# Patient Record
Sex: FEMALE | Race: WHITE | Employment: OTHER | URBAN - METROPOLITAN AREA
[De-identification: names, ages, dates, MRNs, and addresses within clinical notes are randomized per-mention and may not be internally consistent; named-entity substitution may affect disease eponyms.]

---

## 2019-03-07 ENCOUNTER — HOSPITAL ENCOUNTER (EMERGENCY)
Age: 74
Discharge: HOME | End: 2019-03-07
Payer: COMMERCIAL

## 2019-03-07 ENCOUNTER — APPOINTMENT (OUTPATIENT)
Dept: GENERAL RADIOLOGY | Age: 74
End: 2019-03-07
Payer: COMMERCIAL

## 2019-03-07 VITALS
SYSTOLIC BLOOD PRESSURE: 168 MMHG | OXYGEN SATURATION: 92 % | TEMPERATURE: 99.6 F | HEIGHT: 67 IN | BODY MASS INDEX: 14.12 KG/M2 | RESPIRATION RATE: 16 BRPM | DIASTOLIC BLOOD PRESSURE: 97 MMHG | HEART RATE: 92 BPM | WEIGHT: 90 LBS

## 2019-03-07 DIAGNOSIS — S70.02XA CONTUSION OF LEFT HIP, INITIAL ENCOUNTER: ICD-10-CM

## 2019-03-07 DIAGNOSIS — M25.552 LEFT HIP PAIN: ICD-10-CM
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
## 2019-03-07 PROCEDURE — 72110 X-RAY EXAM L-2 SPINE 4/>VWS: CPT

## 2019-03-07 PROCEDURE — 73502 X-RAY EXAM HIP UNI 2-3 VIEWS: CPT

## 2019-03-07 PROCEDURE — 99283 EMERGENCY DEPT VISIT LOW MDM: CPT

## 2019-03-07 ASSESSMENT — PAIN SCALES - GENERAL: PAINLEVEL_OUTOF10: 7

## 2019-03-12 ENCOUNTER — HOSPITAL ENCOUNTER (OUTPATIENT)
Dept: CT IMAGING | Age: 74
Discharge: HOME | End: 2019-03-14
Payer: COMMERCIAL
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
## 2019-03-12 PROCEDURE — 72192 CT PELVIS W/O DYE: CPT

## 2021-06-07 ENCOUNTER — APPOINTMENT (OUTPATIENT)
Dept: ULTRASOUND IMAGING | Age: 76
DRG: 308 | End: 2021-06-07
Payer: MEDICARE

## 2021-06-07 ENCOUNTER — HOSPITAL ENCOUNTER (INPATIENT)
Age: 76
LOS: 6 days | Discharge: HOME | DRG: 308 | End: 2021-06-13
Attending: EMERGENCY MEDICINE | Admitting: INTERNAL MEDICINE
Payer: MEDICARE

## 2021-06-07 ENCOUNTER — APPOINTMENT (OUTPATIENT)
Dept: GENERAL RADIOLOGY | Age: 76
DRG: 308 | End: 2021-06-07
Payer: MEDICARE

## 2021-06-07 DIAGNOSIS — I48.91 ATRIAL FIBRILLATION WITH RVR (HCC): Primary | ICD-10-CM

## 2021-06-07 DIAGNOSIS — J81.0 ACUTE PULMONARY EDEMA (HCC): ICD-10-CM

## 2021-06-07 PROBLEM — K76.1 HEPATIC CONGESTION: Status: ACTIVE | Noted: 2021-06-07

## 2021-06-07 PROBLEM — I50.1 PULMONARY EDEMA CARDIAC CAUSE (HCC): Status: ACTIVE | Noted: 2021-06-07

## 2021-06-07 LAB
ALBUMIN SERPL-MCNC: 3.6 G/DL (ref 3.5–5.2)
ALP BLD-CCNC: 224 U/L (ref 35–104)
ALT SERPL-CCNC: 54 U/L (ref 0–32)
ANION GAP SERPL CALCULATED.3IONS-SCNC: 9 MMOL/L (ref 7–16)
ANISOCYTOSIS: ABNORMAL
AST SERPL-CCNC: 35 U/L (ref 0–31)
BASOPHILS ABSOLUTE: 0.02 E9/L (ref 0–0.2)
BASOPHILS RELATIVE PERCENT: 0.3 % (ref 0–2)
BILIRUB SERPL-MCNC: 0.4 MG/DL (ref 0–1.2)
BUN BLDV-MCNC: 20 MG/DL (ref 6–23)
C-REACTIVE PROTEIN: 2.3 MG/DL (ref 0–0.4)
CALCIUM SERPL-MCNC: 9.4 MG/DL (ref 8.6–10.2)
CHLORIDE BLD-SCNC: 102 MMOL/L (ref 98–107)
CO2: 30 MMOL/L (ref 22–29)
CREAT SERPL-MCNC: 0.7 MG/DL (ref 0.5–1)
D DIMER: 881 NG/ML DDU
EKG ATRIAL RATE: 147 BPM
EKG Q-T INTERVAL: 264 MS
EKG QRS DURATION: 84 MS
EKG QTC CALCULATION (BAZETT): 410 MS
EKG R AXIS: 50 DEGREES
EKG T AXIS: 66 DEGREES
EKG VENTRICULAR RATE: 145 BPM
EOSINOPHILS ABSOLUTE: 0.01 E9/L (ref 0.05–0.5)
EOSINOPHILS RELATIVE PERCENT: 0.2 % (ref 0–6)
GFR AFRICAN AMERICAN: >60
GFR NON-AFRICAN AMERICAN: >60 ML/MIN/1.73
GLUCOSE BLD-MCNC: 114 MG/DL (ref 74–99)
HCT VFR BLD CALC: 37.1 % (ref 34–48)
HEMOGLOBIN: 11.2 G/DL (ref 11.5–15.5)
IMMATURE GRANULOCYTES #: 0.02 E9/L
IMMATURE GRANULOCYTES %: 0.3 % (ref 0–5)
LYMPHOCYTES ABSOLUTE: 0.56 E9/L (ref 1.5–4)
LYMPHOCYTES RELATIVE PERCENT: 8.8 % (ref 20–42)
MCH RBC QN AUTO: 29.6 PG (ref 26–35)
MCHC RBC AUTO-ENTMCNC: 30.2 % (ref 32–34.5)
MCV RBC AUTO: 97.9 FL (ref 80–99.9)
MONOCYTES ABSOLUTE: 0.54 E9/L (ref 0.1–0.95)
MONOCYTES RELATIVE PERCENT: 8.5 % (ref 2–12)
NEUTROPHILS ABSOLUTE: 5.22 E9/L (ref 1.8–7.3)
NEUTROPHILS RELATIVE PERCENT: 81.9 % (ref 43–80)
OVALOCYTES: ABNORMAL
PDW BLD-RTO: 15.1 FL (ref 11.5–15)
PLATELET # BLD: 348 E9/L (ref 130–450)
PMV BLD AUTO: 10.2 FL (ref 7–12)
POIKILOCYTES: ABNORMAL
POLYCHROMASIA: ABNORMAL
POTASSIUM REFLEX MAGNESIUM: 5.2 MMOL/L (ref 3.5–5)
PRO-BNP: 2876 PG/ML (ref 0–450)
PROCALCITONIN: 0.05 NG/ML (ref 0–0.08)
RBC # BLD: 3.79 E12/L (ref 3.5–5.5)
SEDIMENTATION RATE, ERYTHROCYTE: 15 MM/HR (ref 0–20)
SODIUM BLD-SCNC: 141 MMOL/L (ref 132–146)
TOTAL PROTEIN: 7.5 G/DL (ref 6.4–8.3)
TROPONIN, HIGH SENSITIVITY: 15 NG/L (ref 0–9)
WBC # BLD: 6.4 E9/L (ref 4.5–11.5)

## 2021-06-07 PROCEDURE — 83880 ASSAY OF NATRIURETIC PEPTIDE: CPT

## 2021-06-07 PROCEDURE — 87186 SC STD MICRODIL/AGAR DIL: CPT

## 2021-06-07 PROCEDURE — 96365 THER/PROPH/DIAG IV INF INIT: CPT

## 2021-06-07 PROCEDURE — 2500000003 HC RX 250 WO HCPCS: Performed by: EMERGENCY MEDICINE

## 2021-06-07 PROCEDURE — 84484 ASSAY OF TROPONIN QUANT: CPT

## 2021-06-07 PROCEDURE — 85378 FIBRIN DEGRADE SEMIQUANT: CPT

## 2021-06-07 PROCEDURE — 93010 ELECTROCARDIOGRAM REPORT: CPT | Performed by: INTERNAL MEDICINE

## 2021-06-07 PROCEDURE — 2500000003 HC RX 250 WO HCPCS: Performed by: INTERNAL MEDICINE

## 2021-06-07 PROCEDURE — 84145 PROCALCITONIN (PCT): CPT

## 2021-06-07 PROCEDURE — 87040 BLOOD CULTURE FOR BACTERIA: CPT

## 2021-06-07 PROCEDURE — 85651 RBC SED RATE NONAUTOMATED: CPT

## 2021-06-07 PROCEDURE — 85025 COMPLETE CBC W/AUTO DIFF WBC: CPT

## 2021-06-07 PROCEDURE — 71045 X-RAY EXAM CHEST 1 VIEW: CPT

## 2021-06-07 PROCEDURE — 36415 COLL VENOUS BLD VENIPUNCTURE: CPT

## 2021-06-07 PROCEDURE — 93005 ELECTROCARDIOGRAM TRACING: CPT | Performed by: EMERGENCY MEDICINE

## 2021-06-07 PROCEDURE — 80053 COMPREHEN METABOLIC PANEL: CPT

## 2021-06-07 PROCEDURE — 99284 EMERGENCY DEPT VISIT MOD MDM: CPT

## 2021-06-07 PROCEDURE — 87088 URINE BACTERIA CULTURE: CPT

## 2021-06-07 PROCEDURE — 2060000000 HC ICU INTERMEDIATE R&B

## 2021-06-07 PROCEDURE — 2580000003 HC RX 258: Performed by: EMERGENCY MEDICINE

## 2021-06-07 PROCEDURE — 0202U NFCT DS 22 TRGT SARS-COV-2: CPT

## 2021-06-07 PROCEDURE — 93970 EXTREMITY STUDY: CPT

## 2021-06-07 PROCEDURE — 86140 C-REACTIVE PROTEIN: CPT

## 2021-06-07 RX ORDER — POTASSIUM CHLORIDE 7.45 MG/ML
10 INJECTION INTRAVENOUS PRN
Status: DISCONTINUED | OUTPATIENT
Start: 2021-06-07 | End: 2021-06-13 | Stop reason: HOSPADM

## 2021-06-07 RX ORDER — PANTOPRAZOLE SODIUM 40 MG/1
40 TABLET, DELAYED RELEASE ORAL
Status: DISCONTINUED | OUTPATIENT
Start: 2021-06-08 | End: 2021-06-13 | Stop reason: HOSPADM

## 2021-06-07 RX ORDER — 0.9 % SODIUM CHLORIDE 0.9 %
500 INTRAVENOUS SOLUTION INTRAVENOUS ONCE
Status: COMPLETED | OUTPATIENT
Start: 2021-06-07 | End: 2021-06-07

## 2021-06-07 RX ORDER — BUMETANIDE 0.25 MG/ML
1 INJECTION, SOLUTION INTRAMUSCULAR; INTRAVENOUS EVERY 12 HOURS
Status: DISCONTINUED | OUTPATIENT
Start: 2021-06-07 | End: 2021-06-09

## 2021-06-07 RX ORDER — 0.9 % SODIUM CHLORIDE 0.9 %
500 INTRAVENOUS SOLUTION INTRAVENOUS ONCE
Status: DISCONTINUED | OUTPATIENT
Start: 2021-06-07 | End: 2021-06-07

## 2021-06-07 RX ORDER — NITROGLYCERIN 0.4 MG/1
0.4 TABLET SUBLINGUAL EVERY 5 MIN PRN
Status: DISCONTINUED | OUTPATIENT
Start: 2021-06-07 | End: 2021-06-13 | Stop reason: HOSPADM

## 2021-06-07 RX ORDER — POTASSIUM CHLORIDE 20 MEQ/1
40 TABLET, EXTENDED RELEASE ORAL PRN
Status: DISCONTINUED | OUTPATIENT
Start: 2021-06-07 | End: 2021-06-13 | Stop reason: HOSPADM

## 2021-06-07 RX ORDER — ASPIRIN 325 MG
325 TABLET ORAL PRN
COMMUNITY

## 2021-06-07 RX ORDER — DILTIAZEM HYDROCHLORIDE 5 MG/ML
20 INJECTION INTRAVENOUS ONCE
Status: COMPLETED | OUTPATIENT
Start: 2021-06-07 | End: 2021-06-07

## 2021-06-07 RX ORDER — ACETAMINOPHEN 325 MG/1
650 TABLET ORAL EVERY 4 HOURS PRN
Status: DISCONTINUED | OUTPATIENT
Start: 2021-06-07 | End: 2021-06-13 | Stop reason: HOSPADM

## 2021-06-07 RX ADMIN — DILTIAZEM HYDROCHLORIDE 20 MG: 5 INJECTION INTRAVENOUS at 10:15

## 2021-06-07 RX ADMIN — SODIUM CHLORIDE 500 ML: 9 INJECTION, SOLUTION INTRAVENOUS at 09:52

## 2021-06-07 RX ADMIN — DEXTROSE MONOHYDRATE 5 MG/HR: 50 INJECTION, SOLUTION INTRAVENOUS at 10:26

## 2021-06-07 RX ADMIN — BUMETANIDE 1 MG: 0.25 INJECTION INTRAMUSCULAR; INTRAVENOUS at 18:21

## 2021-06-07 NOTE — ED NOTES
RN faxed SBAR to floor. Confirmation received and spoke with Melissa.  Pt ready for transport to room when room is clean       Kim Matson RN  06/07/21 3587

## 2021-06-07 NOTE — ED PROVIDER NOTES
Department of Emergency Medicine   ED  Provider Note  Admit Date/RoomTime: 6/7/2021  9:24 AM  ED Room: 6142/5694-Y          History of Present Illness:  6/7/21, Time: 10:02 AM EDT  Chief Complaint   Patient presents with    Shortness of Breath     BLE edema, x3 weeks, fatigue, dyspnea on exertion    Leg Swelling                Dori Felix is a 68 y.o. female presenting to the ED for dyspnea, beginning 3 weeks ago. The complaint has been constant, severe in severity, and worsened by nothing. Patient noticed increased swelling to bilateral lower extremities. She has increased dyspnea on exertion. She has noticed increased dyspnea while lying flat. She denies any fever chills cough or congestion. Denies any chest pain. She admits to episodes of lightheadedness at home over the last 3 weeks. Review of Systems:   Pertinent positives and negatives are stated within HPI, all other systems reviewed and are negative.        --------------------------------------------- PAST HISTORY ---------------------------------------------  Past Medical History:  has a past medical history of Atrial fibrillation with RVR ([address] Bilateral hearing loss, COPD (chronic obstructive pulmonary disease) ([facility] [address] Hepatic congestion, Moderate to severe mitral regurgitation, Pulmonary edema cardiac cause ([address] and Systolic and diastolic CHF, acute on chronic ([address] Past Surgical History:  has a past surgical history that includes fracture surgery (11/24/215). Social History:  reports that she has never smoked. She has never used smokeless tobacco. She reports that she does not drink alcohol and does not use drugs. Family History: family history includes Heart Attack in her mother; Other in her father. . Unless otherwise noted, family history is non contributory    The patients home medications have been reviewed.     Allergies: Patient has no known allergies. ---------------------------------------------------PHYSICAL EXAM--------------------------------------    Constitutional/General: Alert and oriented x3  Head: Normocephalic and atraumatic  Eyes: PERRL, EOMI, sclera non icteric  Mouth: Oropharynx clear, handling secretions, no trismus, no asymmetry of the posterior oropharynx or uvular edema  Neck: Supple, full ROM, no stridor, no meningeal signs  Respiratory: Lungs clear to auscultation bilaterally,Not in respiratory distress  Cardiovascular:  Irregularly irregular. 2+ distal pulses. Equal extremity pulses. Chest: No chest wall tenderness  GI:  Abdomen Soft, Non tender, Non distended. No rebound, guarding, or rigidity. Musculoskeletal: Moves all extremities x 4. Warm and well perfused, no clubbing, cyanosis. 3+ edema. Capillary refill <3 seconds  Integument: skin warm and dry. No rashes. Neurologic: GCS 15, no focal deficits, symmetric strength 5/5 in the upper and lower extremities bilaterally  Psychiatric: Normal Affect      EKG: Interpreted by emergency department physician, Dr. Sonia Laureano    This EKG is signed and interpreted by me. Rate: 145  Rhythm: Atrial fibrillation  Interpretation: atrial fibrillation (new onset)  Comparison: changes compared to previous EKG      -------------------------------------------------- RESULTS -------------------------------------------------  I have personally reviewed all laboratory and imaging results for this patient. Results are listed below.      LABS: (Lab results interpreted by me)  Results for orders placed or performed during the hospital encounter of 06/07/21   Respiratory Panel, Molecular, with COVID-19 (Restricted: peds pts or suitable admitted adults)    Specimen: Nasopharyngeal; Nasal   Result Value Ref Range    Adenovirus by PCR Not Detected Not Detected    Bordetella parapertussis by PCR Not Detected Not Detected    Bordetella pertussis by PCR Not Detected Not Detected    Chlamydophilia Chloride 107 98 - 107 mmol/L    CO2 31 (H) 22 - 29 mmol/L    Anion Gap 5 (L) 7 - 16 mmol/L    Glucose 107 (H) 74 - 99 mg/dL    BUN 22 6 - 23 mg/dL    CREATININE 0.8 0.5 - 1.0 mg/dL    GFR Non-African American >60 >=60 mL/min/1.73    GFR African American >60     Calcium 8.4 (L) 8.6 - 10.2 mg/dL   Brain Natriuretic Peptide   Result Value Ref Range    Pro-BNP 1,410 (H) 0 - 450 pg/mL   Calcium, Ionized   Result Value Ref Range    Calcium, Ion 1.22 1.15 - 1.33 mmol/L   CBC Auto Differential   Result Value Ref Range    WBC 6.6 4.5 - 11.5 E9/L    RBC 3.58 3.50 - 5.50 E12/L    Hemoglobin 10.3 (L) 11.5 - 15.5 g/dL    Hematocrit 36.0 34.0 - 48.0 %    .6 (H) 80.0 - 99.9 fL    MCH 28.8 26.0 - 35.0 pg    MCHC 28.6 (L) 32.0 - 34.5 %    RDW 15.0 11.5 - 15.0 fL    Platelets 685 512 - 501 E9/L    MPV 10.0 7.0 - 12.0 fL    Neutrophils % 82.4 (H) 43.0 - 80.0 %    Immature Granulocytes % 0.6 0.0 - 5.0 %    Lymphocytes % 6.9 (L) 20.0 - 42.0 %    Monocytes % 8.4 2.0 - 12.0 %    Eosinophils % 1.5 0.0 - 6.0 %    Basophils % 0.2 0.0 - 2.0 %    Neutrophils Absolute 5.40 1.80 - 7.30 E9/L    Immature Granulocytes # 0.04 E9/L    Lymphocytes Absolute 0.45 (L) 1.50 - 4.00 E9/L    Monocytes Absolute 0.55 0.10 - 0.95 E9/L    Eosinophils Absolute 0.10 0.05 - 0.50 E9/L    Basophils Absolute 0.01 0.00 - 0.20 E9/L    Anisocytosis 1+     Polychromasia 1+     Poikilocytes . 1+     Ovalocytes 1+    Lipid Panel   Result Value Ref Range    Cholesterol, Total 120 0 - 199 mg/dL    Triglycerides 74 0 - 149 mg/dL    HDL 44 >40 mg/dL    LDL Calculated 61 0 - 99 mg/dL    VLDL Cholesterol Calculated 15 mg/dL   Magnesium   Result Value Ref Range    Magnesium 2.0 1.6 - 2.6 mg/dL   Lactate, Sepsis   Result Value Ref Range    Lactic Acid, Sepsis 0.9 0.5 - 1.9 mmol/L   Hepatic Function Panel   Result Value Ref Range    Total Protein 6.9 6.4 - 8.3 g/dL    Albumin 3.3 (L) 3.5 - 5.2 g/dL    Alkaline Phosphatase 198 (H) 35 - 104 U/L    ALT 45 (H) 0 - 32 U/L    AST 30 0 - 31 U/L    Total Bilirubin <0.2 0.0 - 1.2 mg/dL    Bilirubin, Direct <0.2 0.0 - 0.3 mg/dL    Bilirubin, Indirect see below 0.0 - 1.0 mg/dL   Hemoglobin A1C   Result Value Ref Range    Hemoglobin A1C 5.6 4.0 - 5.6 %   Ferritin   Result Value Ref Range    Ferritin 82 ng/mL   Iron and TIBC   Result Value Ref Range    Iron 38 37 - 145 mcg/dL    TIBC 251 250 - 450 mcg/dL    Iron Saturation 15 15 - 50 %   Vitamin B12 & Folate   Result Value Ref Range    Vitamin B-12 754 211 - 946 pg/mL    Folate 20.0 4.8 - 24.2 ng/mL   Uric Acid   Result Value Ref Range    Uric Acid, Serum 4.5 2.4 - 5.7 mg/dL   TSH without Reflex   Result Value Ref Range    TSH 2.400 0.270 - 4.200 uIU/mL   Phosphorus   Result Value Ref Range    Phosphorus 3.3 2.5 - 4.5 mg/dL   Sedimentation Rate   Result Value Ref Range    Sed Rate 19 0 - 20 mm/Hr   C-Reactive Protein   Result Value Ref Range    CRP 1.9 (H) 0.0 - 0.4 mg/dL   Basic Metabolic Panel   Result Value Ref Range    Sodium 145 132 - 146 mmol/L    Potassium 4.0 3.5 - 5.0 mmol/L    Chloride 103 98 - 107 mmol/L    CO2 37 (H) 22 - 29 mmol/L    Anion Gap 5 (L) 7 - 16 mmol/L    Glucose 100 (H) 74 - 99 mg/dL    BUN 25 (H) 6 - 23 mg/dL    CREATININE 0.8 0.5 - 1.0 mg/dL    GFR Non-African American >60 >=60 mL/min/1.73    GFR African American >60     Calcium 8.6 8.6 - 10.2 mg/dL   CBC Auto Differential   Result Value Ref Range    WBC 7.3 4.5 - 11.5 E9/L    RBC 3.43 (L) 3.50 - 5.50 E12/L    Hemoglobin 10.1 (L) 11.5 - 15.5 g/dL    Hematocrit 34.5 34.0 - 48.0 %    .6 (H) 80.0 - 99.9 fL    MCH 29.4 26.0 - 35.0 pg    MCHC 29.3 (L) 32.0 - 34.5 %    RDW 15.1 (H) 11.5 - 15.0 fL    Platelets 616 199 - 675 E9/L    MPV 10.0 7.0 - 12.0 fL    Neutrophils % 79.0 43.0 - 80.0 %    Immature Granulocytes % 0.4 0.0 - 5.0 %    Lymphocytes % 6.2 (L) 20.0 - 42.0 %    Monocytes % 11.4 2.0 - 12.0 %    Eosinophils % 2.7 0.0 - 6.0 %    Basophils % 0.3 0.0 - 2.0 %    Neutrophils Absolute 5.75 1.80 - 7.30 E9/L    Immature Non-African American >60 >=60 mL/min/1.73    GFR African American >60     Calcium 8.4 (L) 8.6 - 10.2 mg/dL   CBC Auto Differential   Result Value Ref Range    WBC 8.2 4.5 - 11.5 E9/L    RBC 3.64 3.50 - 5.50 E12/L    Hemoglobin 10.6 (L) 11.5 - 15.5 g/dL    Hematocrit 36.5 34.0 - 48.0 %    .3 (H) 80.0 - 99.9 fL    MCH 29.1 26.0 - 35.0 pg    MCHC 29.0 (L) 32.0 - 34.5 %    RDW 14.9 11.5 - 15.0 fL    Platelets 201 743 - 539 E9/L    MPV 9.8 7.0 - 12.0 fL    Neutrophils % 92.1 (H) 43.0 - 80.0 %    Lymphocytes % 4.4 (L) 20.0 - 42.0 %    Monocytes % 1.8 (L) 2.0 - 12.0 %    Eosinophils % 1.7 0.0 - 6.0 %    Basophils % 0.0 0.0 - 2.0 %    Neutrophils Absolute 7.54 (H) 1.80 - 7.30 E9/L    Lymphocytes Absolute 0.33 (L) 1.50 - 4.00 E9/L    Monocytes Absolute 0.16 0.10 - 0.95 E9/L    Eosinophils Absolute 0.14 0.05 - 0.50 E9/L    Basophils Absolute 0.00 0.00 - 0.20 E9/L    nRBC 0.0 /100 WBC    Polychromasia 1+     Hypochromia 1+     Poikilocytes 1+     Ovalocytes 1+    Magnesium   Result Value Ref Range    Magnesium 1.6 1.6 - 2.6 mg/dL   Hepatic Function Panel   Result Value Ref Range    Total Protein 7.5 6.4 - 8.3 g/dL    Albumin 3.6 3.5 - 5.2 g/dL    Alkaline Phosphatase 176 (H) 35 - 104 U/L    ALT 33 (H) 0 - 32 U/L    AST 21 0 - 31 U/L    Total Bilirubin 0.3 0.0 - 1.2 mg/dL    Bilirubin, Direct <0.2 0.0 - 0.3 mg/dL    Bilirubin, Indirect see below 0.0 - 1.0 mg/dL   Phosphorus   Result Value Ref Range    Phosphorus 2.7 2.5 - 4.5 mg/dL   Sedimentation Rate   Result Value Ref Range    Sed Rate 31 (H) 0 - 20 mm/Hr   C-Reactive Protein   Result Value Ref Range    CRP 2.0 (H) 0.0 - 0.4 mg/dL   EKG 12 Lead   Result Value Ref Range    Ventricular Rate 145 BPM    Atrial Rate 147 BPM    QRS Duration 84 ms    Q-T Interval 264 ms    QTc Calculation (Bazett) 410 ms    R Axis 50 degrees    T Axis 66 degrees   ,       RADIOLOGY:  Interpreted by Radiologist unless otherwise specified  US DUP LOWER EXTREMITIES BILATERAL VENOUS   Final Result   No evidence of DVT in either lower extremity. XR CHEST PORTABLE   Final Result   Hazy opacities are most suspicious for pneumonia. Pulmonary edema would be   an alternative consideration. CT CHEST WO CONTRAST    (Results Pending)   NM Cardiac Stress Test Nuclear Imaging    (Results Pending)                    ------------------------- NURSING NOTES AND VITALS REVIEWED ---------------------------   The nursing notes within the ED encounter and vital signs as below have been reviewed by myself  /65   Pulse 117   Temp 97.3 °F (36.3 °C) (Infrared)   Resp 16   Ht 5' 6\" (1.676 m)   Wt 84 lb 3.2 oz (38.2 kg)   SpO2 100%   BMI 13.59 kg/m²     Oxygen Saturation Interpretation: Normal    The cardiac monitor revealed Afib with a heart rate in the 140s as interpreted by me. The cardiac monitor was ordered secondary to the patient's heart rate and to monitor the patient for dysrhythmia. CPT 31148    The patients available past medical records and past encounters were reviewed.         ------------------------------ ED COURSE/MEDICAL DECISION MAKING----------------------  Medications   nitroGLYCERIN (NITROSTAT) SL tablet 0.4 mg (has no administration in time range)   pantoprazole (PROTONIX) tablet 40 mg (40 mg Oral Given 6/10/21 0647)   acetaminophen (TYLENOL) tablet 650 mg (has no administration in time range)   potassium chloride (KLOR-CON M) extended release tablet 40 mEq (has no administration in time range)     Or   potassium bicarb-citric acid (EFFER-K) effervescent tablet 40 mEq (has no administration in time range)     Or   potassium chloride 10 mEq/100 mL IVPB (Peripheral Line) (has no administration in time range)   apixaban (ELIQUIS) tablet 5 mg (5 mg Oral Given 6/10/21 1001)   cephALEXin (KEFLEX) capsule 250 mg (250 mg Oral Given 6/10/21 1353)   sacubitril-valsartan (ENTRESTO) 24-26 MG per tablet 0.5 tablet (0.5 tablets Oral Given 6/10/21 8313)   metoprolol succinate (TOPROL XL) extended release tablet 12.5 mg (12.5 mg Oral Given 6/10/21 1353)   amiodarone (CORDARONE) tablet 200 mg (has no administration in time range)   torsemide (DEMADEX) tablet 20 mg (has no administration in time range)   regadenoson (LEXISCAN) injection 0.4 mg (has no administration in time range)   0.9 % sodium chloride bolus (0 mLs Intravenous Stopped 6/7/21 1028)   dilTIAZem injection 20 mg (20 mg Intravenous Given 6/7/21 1015)   amiodarone (CORDARONE) 150 mg in dextrose 5 % 100 mL bolus (0 mg Intravenous Stopped 6/8/21 1743)                    Medical Decision Making:     I, Dr. Gerald Camacho am the primary provider of record    Patient presents in atrial fibrillation with rapid ventricular response. Given medications with improvement in the emergency department. Patient remained hemodynamically stable. She does have a degree of pulmonary edema likely contributing to her dyspnea. I believe the patient would benefit from admission for further evaluation and management. Discussed patient care with admitting physician. Re-Evaluations:       Improved on reevaluation. This patient's ED course included: a personal history and physicial examination, re-evaluation prior to disposition, multiple bedside re-evaluations, IV medications, cardiac monitoring, continuous pulse oximetry and complex medical decision making and emergency management    This patient has remained hemodynamically stable during their ED course. Counseling: The emergency provider has spoken with the patient and discussed todays results, in addition to providing specific details for the plan of care and counseling regarding the diagnosis and prognosis. Questions are answered at this time and they are agreeable with the plan.       --------------------------------- IMPRESSION AND DISPOSITION ---------------------------------    IMPRESSION  1. Atrial fibrillation with RVR ([address]    2.  Acute pulmonary edema (HCC) DISPOSITION  Disposition: Admit to telemetry  Patient condition is stable        NOTE: This report was transcribed using voice recognition software.  Every effort was made to ensure accuracy; however, inadvertent computerized transcription errors may be present        Dennys Mittal MD  06/10/21 1459

## 2021-06-08 LAB
ADENOVIRUS BY PCR: NOT DETECTED
ALBUMIN SERPL-MCNC: 3.3 G/DL (ref 3.5–5.2)
ALP BLD-CCNC: 198 U/L (ref 35–104)
ALT SERPL-CCNC: 45 U/L (ref 0–32)
ANION GAP SERPL CALCULATED.3IONS-SCNC: 5 MMOL/L (ref 7–16)
ANISOCYTOSIS: ABNORMAL
AST SERPL-CCNC: 30 U/L (ref 0–31)
BASOPHILS ABSOLUTE: 0.01 E9/L (ref 0–0.2)
BASOPHILS RELATIVE PERCENT: 0.2 % (ref 0–2)
BILIRUB SERPL-MCNC: <0.2 MG/DL (ref 0–1.2)
BILIRUBIN DIRECT: <0.2 MG/DL (ref 0–0.3)
BILIRUBIN, INDIRECT: ABNORMAL MG/DL (ref 0–1)
BORDETELLA PARAPERTUSSIS BY PCR: NOT DETECTED
BORDETELLA PERTUSSIS BY PCR: NOT DETECTED
BUN BLDV-MCNC: 22 MG/DL (ref 6–23)
C-REACTIVE PROTEIN: 1.9 MG/DL (ref 0–0.4)
CALCIUM IONIZED: 1.22 MMOL/L (ref 1.15–1.33)
CALCIUM SERPL-MCNC: 8.4 MG/DL (ref 8.6–10.2)
CHLAMYDOPHILIA PNEUMONIAE BY PCR: NOT DETECTED
CHLORIDE BLD-SCNC: 107 MMOL/L (ref 98–107)
CHOLESTEROL, TOTAL: 120 MG/DL (ref 0–199)
CO2: 31 MMOL/L (ref 22–29)
CORONAVIRUS 229E BY PCR: NOT DETECTED
CORONAVIRUS HKU1 BY PCR: NOT DETECTED
CORONAVIRUS NL63 BY PCR: NOT DETECTED
CORONAVIRUS OC43 BY PCR: NOT DETECTED
CREAT SERPL-MCNC: 0.8 MG/DL (ref 0.5–1)
EOSINOPHILS ABSOLUTE: 0.1 E9/L (ref 0.05–0.5)
EOSINOPHILS RELATIVE PERCENT: 1.5 % (ref 0–6)
FERRITIN: 82 NG/ML
FOLATE: 20 NG/ML (ref 4.8–24.2)
GFR AFRICAN AMERICAN: >60
GFR NON-AFRICAN AMERICAN: >60 ML/MIN/1.73
GLUCOSE BLD-MCNC: 107 MG/DL (ref 74–99)
HBA1C MFR BLD: 5.6 % (ref 4–5.6)
HCT VFR BLD CALC: 36 % (ref 34–48)
HDLC SERPL-MCNC: 44 MG/DL
HEMOGLOBIN: 10.3 G/DL (ref 11.5–15.5)
HUMAN METAPNEUMOVIRUS BY PCR: NOT DETECTED
HUMAN RHINOVIRUS/ENTEROVIRUS BY PCR: NOT DETECTED
IMMATURE GRANULOCYTES #: 0.04 E9/L
IMMATURE GRANULOCYTES %: 0.6 % (ref 0–5)
INFLUENZA A BY PCR: NOT DETECTED
INFLUENZA B BY PCR: NOT DETECTED
IRON SATURATION: 15 % (ref 15–50)
IRON: 38 MCG/DL (ref 37–145)
LACTIC ACID, SEPSIS: 0.9 MMOL/L (ref 0.5–1.9)
LDL CHOLESTEROL CALCULATED: 61 MG/DL (ref 0–99)
LV EF: 28 %
LVEF MODALITY: NORMAL
LYMPHOCYTES ABSOLUTE: 0.45 E9/L (ref 1.5–4)
LYMPHOCYTES RELATIVE PERCENT: 6.9 % (ref 20–42)
MAGNESIUM: 2 MG/DL (ref 1.6–2.6)
MCH RBC QN AUTO: 28.8 PG (ref 26–35)
MCHC RBC AUTO-ENTMCNC: 28.6 % (ref 32–34.5)
MCV RBC AUTO: 100.6 FL (ref 80–99.9)
MONOCYTES ABSOLUTE: 0.55 E9/L (ref 0.1–0.95)
MONOCYTES RELATIVE PERCENT: 8.4 % (ref 2–12)
MYCOPLASMA PNEUMONIAE BY PCR: NOT DETECTED
NEUTROPHILS ABSOLUTE: 5.4 E9/L (ref 1.8–7.3)
NEUTROPHILS RELATIVE PERCENT: 82.4 % (ref 43–80)
OVALOCYTES: ABNORMAL
PARAINFLUENZA VIRUS 1 BY PCR: NOT DETECTED
PARAINFLUENZA VIRUS 2 BY PCR: NOT DETECTED
PARAINFLUENZA VIRUS 3 BY PCR: NOT DETECTED
PARAINFLUENZA VIRUS 4 BY PCR: NOT DETECTED
PDW BLD-RTO: 15 FL (ref 11.5–15)
PHOSPHORUS: 3.3 MG/DL (ref 2.5–4.5)
PLATELET # BLD: 298 E9/L (ref 130–450)
PMV BLD AUTO: 10 FL (ref 7–12)
POIKILOCYTES: ABNORMAL
POLYCHROMASIA: ABNORMAL
POTASSIUM SERPL-SCNC: 5.4 MMOL/L (ref 3.5–5)
PRO-BNP: 1410 PG/ML (ref 0–450)
RBC # BLD: 3.58 E12/L (ref 3.5–5.5)
RESPIRATORY SYNCYTIAL VIRUS BY PCR: NOT DETECTED
SARS-COV-2, PCR: NOT DETECTED
SEDIMENTATION RATE, ERYTHROCYTE: 19 MM/HR (ref 0–20)
SODIUM BLD-SCNC: 143 MMOL/L (ref 132–146)
TOTAL IRON BINDING CAPACITY: 251 MCG/DL (ref 250–450)
TOTAL PROTEIN: 6.9 G/DL (ref 6.4–8.3)
TRIGL SERPL-MCNC: 74 MG/DL (ref 0–149)
TSH SERPL DL<=0.05 MIU/L-ACNC: 2.4 UIU/ML (ref 0.27–4.2)
URIC ACID, SERUM: 4.5 MG/DL (ref 2.4–5.7)
VITAMIN B-12: 754 PG/ML (ref 211–946)
VLDLC SERPL CALC-MCNC: 15 MG/DL
WBC # BLD: 6.6 E9/L (ref 4.5–11.5)

## 2021-06-08 PROCEDURE — 85025 COMPLETE CBC W/AUTO DIFF WBC: CPT

## 2021-06-08 PROCEDURE — 6360000002 HC RX W HCPCS: Performed by: INTERNAL MEDICINE

## 2021-06-08 PROCEDURE — 83540 ASSAY OF IRON: CPT

## 2021-06-08 PROCEDURE — 2060000000 HC ICU INTERMEDIATE R&B

## 2021-06-08 PROCEDURE — 83880 ASSAY OF NATRIURETIC PEPTIDE: CPT

## 2021-06-08 PROCEDURE — 82330 ASSAY OF CALCIUM: CPT

## 2021-06-08 PROCEDURE — 80061 LIPID PANEL: CPT

## 2021-06-08 PROCEDURE — 86140 C-REACTIVE PROTEIN: CPT

## 2021-06-08 PROCEDURE — 36415 COLL VENOUS BLD VENIPUNCTURE: CPT

## 2021-06-08 PROCEDURE — 80048 BASIC METABOLIC PNL TOTAL CA: CPT

## 2021-06-08 PROCEDURE — 84443 ASSAY THYROID STIM HORMONE: CPT

## 2021-06-08 PROCEDURE — 85651 RBC SED RATE NONAUTOMATED: CPT

## 2021-06-08 PROCEDURE — 83605 ASSAY OF LACTIC ACID: CPT

## 2021-06-08 PROCEDURE — 83735 ASSAY OF MAGNESIUM: CPT

## 2021-06-08 PROCEDURE — 6370000000 HC RX 637 (ALT 250 FOR IP): Performed by: INTERNAL MEDICINE

## 2021-06-08 PROCEDURE — 82746 ASSAY OF FOLIC ACID SERUM: CPT

## 2021-06-08 PROCEDURE — 82728 ASSAY OF FERRITIN: CPT

## 2021-06-08 PROCEDURE — 83036 HEMOGLOBIN GLYCOSYLATED A1C: CPT

## 2021-06-08 PROCEDURE — 2500000003 HC RX 250 WO HCPCS: Performed by: INTERNAL MEDICINE

## 2021-06-08 PROCEDURE — 2580000003 HC RX 258: Performed by: INTERNAL MEDICINE

## 2021-06-08 PROCEDURE — 80076 HEPATIC FUNCTION PANEL: CPT

## 2021-06-08 PROCEDURE — 84550 ASSAY OF BLOOD/URIC ACID: CPT

## 2021-06-08 PROCEDURE — 83550 IRON BINDING TEST: CPT

## 2021-06-08 PROCEDURE — 84100 ASSAY OF PHOSPHORUS: CPT

## 2021-06-08 PROCEDURE — 93306 TTE W/DOPPLER COMPLETE: CPT

## 2021-06-08 PROCEDURE — 82607 VITAMIN B-12: CPT

## 2021-06-08 RX ORDER — AMIODARONE HYDROCHLORIDE 200 MG/1
200 TABLET ORAL DAILY
Status: DISCONTINUED | OUTPATIENT
Start: 2021-06-08 | End: 2021-06-10

## 2021-06-08 RX ADMIN — AMIODARONE HYDROCHLORIDE 150 MG: 50 INJECTION, SOLUTION INTRAVENOUS at 14:03

## 2021-06-08 RX ADMIN — DEXTROSE MONOHYDRATE 5 MG/HR: 50 INJECTION, SOLUTION INTRAVENOUS at 02:35

## 2021-06-08 RX ADMIN — AMIODARONE HYDROCHLORIDE 200 MG: 200 TABLET ORAL at 13:49

## 2021-06-08 RX ADMIN — APIXABAN 5 MG: 5 TABLET, FILM COATED ORAL at 13:52

## 2021-06-08 RX ADMIN — BUMETANIDE 1 MG: 0.25 INJECTION INTRAMUSCULAR; INTRAVENOUS at 05:45

## 2021-06-08 RX ADMIN — APIXABAN 5 MG: 5 TABLET, FILM COATED ORAL at 20:25

## 2021-06-08 RX ADMIN — DEXTROSE MONOHYDRATE 10 MG/HR: 50 INJECTION, SOLUTION INTRAVENOUS at 09:05

## 2021-06-08 RX ADMIN — BUMETANIDE 1 MG: 0.25 INJECTION INTRAMUSCULAR; INTRAVENOUS at 17:51

## 2021-06-08 ASSESSMENT — PAIN SCALES - GENERAL
PAINLEVEL_OUTOF10: 0
PAINLEVEL_OUTOF10: 0

## 2021-06-08 NOTE — PROGRESS NOTES
Patient refusing first dose of apixaban this evening. Patient educated about atrial fibrillation and anticoagulation treatment. RN printed and gave education leaflets to patient. RN also spoke with patient and patient's daughter about both education topics. All questions answered at this time and patient continuing to refused apixaban at this time.

## 2021-06-08 NOTE — H&P
History and Physical      CHIEF COMPLAINT: Short of breath, leg edema    History of Present Illness: 79-year-old female patient of Dr. Cherie Curiel am asked admit and follow. History obtained from patient as well as electronic record. Patient has noted for the last 3 weeks is becoming increasingly short of breath. Became unbearable day of admission. She is noticed increasing edema of legs for last 3 to 4 days. She has no previous cardiac history she is aware of. In the ED she was found to have atrial fibrillation with RVR rate of 150. Chest x-ray done in the ED revealed pulmonary edema. Since admission, she is feeling much better. In fact she \"wants to go home\". Extensive discussion with her regarding atrial fibrillation, potential for embolic CVA as well as pulmonary edema. If she left today she will likely be back within 24/48 hrs. with the same problems. IV diltiazem has slowed her heart rate; however when ID diltiazem was clamped heart rate increased again to 140. proBNP elevated 1410. Alkaline phosphatase elevated 198 ALT 45. The above seem likely due to hepatic congestion due to the congestive heart failure. --Past medical history negative rheumatic fever scarlet fever polio diphtheria cancer diabetes hypertension  --Past surgical history includes only right hip ORIF due to fracture which occurred in 2015 from a fall. No other surgical procedures. --Lifelong non-smoker  --Patient is still working at [employer], in the [address]; usual day 10 AM until 6:00 PM.  --Denies any recurring pulmonary difficulties cough dyspnea chest pain. Does get rare indigestion but no significant GI  discomfort. No blood in stool blood in urine. --Patient was reluctant to take any medication; finally agrees to begin apixaban as well as amiodarone.         Past Medical History:   Diagnosis Date    Atrial fibrillation with RVR ([facility] [address] 06/07/2021    Bilateral hearing loss     Hepatic congestion 06/07/2021    Due to CHF    Pulmonary edema cardiac cause ([facility] [address] 06/07/2021         Past Surgical History:   Procedure Laterality Date    FRACTURE SURGERY  11/24/215    right hip       Medications Prior to Admission:    Medications Prior to Admission: aspirin (EVER ASPIRIN) 325 MG tablet, Take 325 mg by mouth as needed for Pain  Acetaminophen (APAP) 325 MG TABS, Take 325 mg by mouth as needed for Pain    Allergies:    Patient has no known allergies. Social History:    reports that she has never smoked. She has never used smokeless tobacco. She reports that she does not drink alcohol and does not use drugs. Family History:   family history includes Heart Attack in her mother; Other in her father. REVIEW OF SYSTEMS:  As above in the HPI, otherwise negative    PHYSICAL EXAM:    VS: BP 98/65   Pulse 96   Temp 97.8 °F (36.6 °C) (Axillary)   Resp 16   Wt 93 lb 12.8 oz (42.5 kg)   SpO2 95%   BMI 14.69 kg/m²     General appearance: Alert, Awake, Oriented times 3, no distress; mildly anxious  Skin: Warm and dry ; no rashes  Head: Normocephalic. No masses, lesions or tenderness noted  Eyes: Conjunctivae pink, sclera white. PERRL,EOM-I  Ears: External ears normal; hard of hearing  Nose/Sinuses: Nares normal. Septum midline. Mucosa normal. No drainage  Oropharynx: Oropharynx clear with no exudate seen  Neck: Supple. No jugular venous distension, lymphadenopathy or thyromegaly Trachea midline  Lungs: Soft rales at both bases  Heart: Irregularly irregular at 96/min. Abdomen: Soft, non-tender. BS normal. No masses, organomegaly; no rebound or guarding  Extremities: 1+ edema, Peripheral pulses palpable  Musculoskeletal: Muscular strength appears intact. Neuro:  No focal motor defects ; II-XII grossly intact .  KERN equally  Breast: deferred  Rectal: deferred  Genitalia:  deferred    LABS:  CBC:   Lab Results   Component Value Date    WBC 6.6 06/08/2021    RBC 3.58 06/08/2021    HGB 10.3 06/08/2021    HCT 36.0 06/08/2021    .6 06/08/2021    MCH 28.8 06/08/2021    MCHC 28.6 06/08/2021    RDW 15.0 06/08/2021     06/08/2021    MPV 10.0 06/08/2021     CBC with Differential:    Lab Results   Component Value Date    WBC 6.6 06/08/2021    RBC 3.58 06/08/2021    HGB 10.3 06/08/2021    HCT 36.0 06/08/2021     06/08/2021    .6 06/08/2021    MCH 28.8 06/08/2021    MCHC 28.6 06/08/2021    RDW 15.0 06/08/2021    LYMPHOPCT 6.9 06/08/2021    MONOPCT 8.4 06/08/2021    BASOPCT 0.2 06/08/2021    MONOSABS 0.55 06/08/2021    LYMPHSABS 0.45 06/08/2021    EOSABS 0.10 06/08/2021    BASOSABS 0.01 06/08/2021     Hemoglobin/Hematocrit:    Lab Results   Component Value Date    HGB 10.3 06/08/2021    HCT 36.0 06/08/2021     CMP:    Lab Results   Component Value Date     06/08/2021    K 5.4 06/08/2021    K 5.2 06/07/2021     06/08/2021    CO2 31 06/08/2021    BUN 22 06/08/2021    CREATININE 0.8 06/08/2021    GFRAA >60 06/08/2021    LABGLOM >60 06/08/2021    GLUCOSE 107 06/08/2021    PROT 6.9 06/08/2021    LABALBU 3.3 06/08/2021    CALCIUM 8.4 06/08/2021    BILITOT <0.2 06/08/2021    ALKPHOS 198 06/08/2021    AST 30 06/08/2021    ALT 45 06/08/2021     BMP:    Lab Results   Component Value Date     06/08/2021    K 5.4 06/08/2021    K 5.2 06/07/2021     06/08/2021    CO2 31 06/08/2021    BUN 22 06/08/2021    LABALBU 3.3 06/08/2021    CREATININE 0.8 06/08/2021    CALCIUM 8.4 06/08/2021    GFRAA >60 06/08/2021    LABGLOM >60 06/08/2021    GLUCOSE 107 06/08/2021     Hepatic Function Panel:    Lab Results   Component Value Date    ALKPHOS 198 06/08/2021    ALT 45 06/08/2021    AST 30 06/08/2021    PROT 6.9 06/08/2021    BILITOT <0.2 06/08/2021    BILIDIR <0.2 06/08/2021    IBILI see below 06/08/2021    LABALBU 3.3 06/08/2021     Ionized Calcium:  No results found for: IONCA  Magnesium:    Lab Results   Component Value Date    MG 2.0 06/08/2021     Phosphorus:    Lab Results   Component Value Date    PHOS 3.3 06/08/2021     LDH:  No results found for: LDH  Uric Acid:    Lab Results   Component Value Date    LABURIC 4.5 06/08/2021     PT/INR:    Lab Results   Component Value Date    PROTIME 10.9 12/13/2016    INR 1.0 12/13/2016     Warfarin PT/INR:  No components found for: Alex Calle  PTT:    Lab Results   Component Value Date    APTT 26.8 12/13/2016   [APTT}  Troponin:  No results found for: TROPONINI  Last 3 Troponin:  No results found for: TROPONINI  U/A:    Lab Results   Component Value Date    COLORU Yellow 11/23/2015    PROTEINU Negative 11/23/2015    PHUR 6.0 11/23/2015    WBCUA 5-10 11/23/2015    RBCUA 0-1 11/23/2015    BACTERIA MANY 11/23/2015    CLARITYU Clear 11/23/2015    SPECGRAV 1.025 11/23/2015    LEUKOCYTESUR Negative 11/23/2015    UROBILINOGEN 0.2 11/23/2015    BILIRUBINUR Negative 11/23/2015    BLOODU MODERATE 11/23/2015    GLUCOSEU Negative 11/23/2015     HgBA1c:    Lab Results   Component Value Date    LABA1C 5.6 06/08/2021     FLP:    Lab Results   Component Value Date    TRIG 74 06/08/2021    HDL 44 06/08/2021    LDLCALC 61 06/08/2021    LABVLDL 15 06/08/2021     TSH:    Lab Results   Component Value Date    TSH 2.400 06/08/2021     VITAMIN B12: No components found for: B12  FOLATE:    Lab Results   Component Value Date    FOLATE 20.0 06/08/2021     IRON:    Lab Results   Component Value Date    IRON 38 06/08/2021     Iron Saturation:  No components found for: PERCENTFE  TIBC:    Lab Results   Component Value Date    TIBC 251 06/08/2021     FERRITIN:    Lab Results   Component Value Date    FERRITIN 82 06/08/2021     RPR:  No results found for: RPR  HIV:  No results found for: HIV  KEITH:  No results found for: ANATITER, KEITH    RADIOLOGY:  US DUP LOWER EXTREMITIES BILATERAL VENOUS   Final Result   No evidence of DVT in either lower extremity. XR CHEST PORTABLE   Final Result   Hazy opacities are most suspicious for pneumonia.   Pulmonary edema would be   an alternative consideration.              ASSESSMENT:      Active Hospital Problems    Diagnosis     Bilateral hearing loss [H91.93]     Atrial fibrillation with RVR (HCC) [I48.91]     Pulmonary edema cardiac cause (HCC) [I50.1]     Hepatic congestion [K76.1]      Acute congestive heart failure, type uncertain at this moment; 2D echo is pending      PLAN:  Medications discussed with patient  GI prophylaxis  DVT prophylaxis  Consultants notes reviewed--I discussed case personally with cardiology  2D echo  Apixaban 5 mg twice daily  Monitor labs  Diltiazem infusion, titrating dose  Bumetanide 1 mg IV every 12 hours  Amiodarone 200 mg by mouth daily  Amiodarone 150 mg IV 1 time  Nasal cannula oxygen as needed        Please note that over 50 minutes was spent in evaluating the patient, review of records and results, discussion with staff/family, etc.    [address]  DO  1:57 PM  6/8/2021    Voice recognition software use for dictation

## 2021-06-08 NOTE — CARE COORDINATION
COVID negative 6/7. Met w/ patient. Explained role of  and plan of care. Lives w/ daughter Roberto Patton in an apartment- 18 total steps to entrance. Independent PTA- pt states she still works. PCP is Dr. Rochelle Centeno and pharmacy is [address] [place] [address]. Awaiting PT/OT evals. On Cardizem drip, Bumex iv bid. Requiring O2 4lNC- DOES NOT wear home O2- DME list offered and declined- no preference- Fabrice Ernandez @ [address] [place] [address] notified of referral to follow. Per pt, plan is to return home on discharge-states has no needs- will follow O2 demand. Paola Kelly, RN case manager    The Plan for Transition of Care is related to the following treatment goals: respiratory conditioning    The Patient and/or patient representative Radha Barclay was provided with a choice of provider and agrees   with the discharge plan. [x] Yes [] No    Freedom of choice list was provided with basic dialogue that supports the patient's individualized plan of care/goals, treatment preferences and shares the quality data associated with the providers.  [x] Yes [] No

## 2021-06-08 NOTE — CONSULTS
CARDIOLOGY CONSULTATION    Patient Name:  Kit Brown    :  1945    Reason for Consultation:   Shortness of breath; atrial fibrillation rapid ventricular response    History of Present Illness:   Kit Brown presents to [facility], following a history of increasing fatigue and shortness of breath over the past 7 days or so prior to admission. She denies any chest discomfort and has no previous cardiac history. She has no previous history of palpitations nor exertional chest discomfort. She denies any hemoptysis. She has no known history of thyroid disease. She denies any significant weight loss despite being quite asthenic    Past Medical History:   has a past medical history of Atrial fibrillation with RVR ([facility] [address] Hepatic congestion, and Pulmonary edema cardiac cause ([facility] [address] Surgical History:   has a past surgical history that includes fracture surgery right hip (). Social History:   reports that she has never smoked. She has never used smokeless tobacco. She reports that she does not drink alcohol and does not use drugs. Family History:  family history is remarkable for mother  heart disease Father  unknown cause. Medications:  Prior to Admission medications    Medication Sig Start Date End Date Taking? Authorizing Provider   aspirin (EVER ASPIRIN) 325 MG tablet Take 325 mg by mouth as needed for Pain    Historical Provider, MD   Acetaminophen (APAP) 325 MG TABS Take 325 mg by mouth as needed for Pain    Historical Provider, MD       Allergies:  Patient has no known allergies. Review of Systems:   · Constitutional: there has been no unanticipated weight loss. There's been a significant + change in energy level, but not in sleep pattern or activity level. No fever chills or rigors. · Eyes: No visual changes or diplopia. No scleral icterus. · ENT: No Headaches, hearing loss or vertigo. No mouth sores or sore throat.  No change Carotids brisk in upstroke without bruits, no abnormal JVP noted at 45°. Chest: Even excursion  Lungs: Lungs clear to auscultation bilaterally. No retractions or use of accessory muscles. No tactile vocal fremitus. No rhonchi, crackles or rales. Heart:  S1 > S2. Irregular, irregular rhythm. No gallop or murmur. No rub, palpable thrill or heave noted. PMI 5th intercostal space midclavicular line. Abdomen: Abdomen soft, scaphoid, non-tender. BS normal. No masses, organomegaly. No hernia noted. Extremities: Extremities normal. No deformities, edema, or skin discoloration. No cyanosis or clubbing noted to the nails. Peripheral pulses present 2+ upper extremities and present 1+  lower extremities. Musculoskeletal: Spine ROM normal. Muscular strength intact. Neuro: Cranial nerves intact. Motor: Strength 5/5 in all extremities. Reflexes 2+ in all extremities. No focal weakness. Sensory: grossly normal to touch. Coordination intact. Pertinent Labs:  CBC:   Recent Labs     06/07/21  1004 06/08/21  0340   WBC 6.4 6.6   HGB 11.2* 10.3*    298     BMP:  Recent Labs     06/07/21  1004 06/08/21  0340    143   K 5.2* 5.4*    107   CO2 30* 31*   BUN 20 22   CREATININE 0.7 0.8   GLUCOSE 114* 107*   LABGLOM >60 >60     ABGs: No results found for: PH, PO2, PCO2  INR: No results for input(s): INR in the last 72 hours. PRO-BNP:   Lab Results   Component Value Date    PROBNP 1,410 (H) 06/08/2021    PROBNP 2,876 (H) 06/07/2021      Cardiac Injury Profile: No results for input(s): CKTOTAL, CKMB, CKMBINDEX, TROPONINI in the last 72 hours.    Lipid Profile:   Lab Results   Component Value Date    TRIG 74 06/08/2021    HDL 44 06/08/2021    LDLCALC 61 06/08/2021    CHOL 120 06/08/2021      Thyroid:   Lab Results   Component Value Date    TSH 2.400 06/08/2021      Hemoglobin A1C: No components found for: HGBA1C   ECG:  See report    Radiology:  XR CHEST PORTABLE    Result Date: 6/7/2021  EXAMINATION: ONE XRAY VIEW OF THE CHEST 6/7/2021 7:43 am COMPARISON: One-view chest x-ray 11/23/2015. CT chest 01/15/2009. HISTORY: ORDERING SYSTEM PROVIDED HISTORY: cough TECHNOLOGIST PROVIDED HISTORY: Reason for exam:->cough FINDINGS: There is mild-moderate enlargement of the cardiac silhouette, which may be exaggerated by AP technique. The mediastinal contours are within normal limits. The lungs appear hyperinflated. There are hazy opacities in the perihilar and basilar regions, left greater than right. Scarring is redemonstrated in the lung apices. No significant pleural effusions or pneumothorax. The bones appear osteopenic. EKG leads overlie the chest.     Hazy opacities are most suspicious for pneumonia. Pulmonary edema would be an alternative consideration. US DUP LOWER EXTREMITIES BILATERAL VENOUS    Result Date: 6/7/2021  EXAMINATION: DUPLEX VENOUS ULTRASOUND OF THE BILATERAL LOWER EXTREMITIES, 6/7/2021 7:06 pm TECHNIQUE: Duplex ultrasound using B-mode/gray scaled imaging and Doppler spectral analysis and color flow was obtained of the bilateral lower extremities. COMPARISON: None used. HISTORY: ORDERING SYSTEM PROVIDED HISTORY: Leg edema rule out DVT TECHNOLOGIST PROVIDED HISTORY: Reason for exam:->Leg edema rule out DVT What reading provider will be dictating this exam?->CRC FINDINGS: The visualized veins of the bilateral lower extremities are patent and free of echogenic thrombus. The veins demonstrate good compressibility with normal color flow study and spectral analysis. Bilateral soft tissue swelling. No evidence of DVT in either lower extremity. Assessment:    Principal Problem:    Atrial fibrillation with RVR (HCC)  Active Problems:    Pulmonary edema cardiac cause (HCC)    Hepatic congestion  Resolved Problems:    * No resolved hospital problems. *      Plan:  Mrs. Bri Willett presents with a difficult situation from an intervention standpoint as she is already refusing anticoagulation despite a full explanation as to the reasons that she should be based on [id number] score. Additionally she does not like pills and would thus hopefully reduce her hospitalization and decrease her quantity of pills for better compliance to amiodarone and low-dose and eventually 100 mg daily. Would also perhaps have  to look into her coverage for apixaban and utilize this rather than warfarin due to the potential drug drug interaction of warfarin and amiodarone as well. I am still awaiting completion of her two-dimensional echocardiogram as ordered yesterday. Based upon the echocardiographic findings further recommendations will be made. I have spent more than 45 minutes face to face with Aubree Myers reviewing notes and laboratory data with greater than 50% of this time instructing and counseling the patient regarding my findings and recommendations and I have answered all questions as posed to me by Ms. Belen Agarwal. Thank you, Radha Davison DO and Robert Castro MD for allowing me to consult in the care of this patient. Vanessa Essex, DO, FACP, [facility], [facility]    NOTE:  This report was transcribed using voice recognition software. Every effort was made to ensure accuracy; however, inadvertent computerized transcription errors may be present.

## 2021-06-09 PROBLEM — I34.0 MODERATE TO SEVERE MITRAL REGURGITATION: Status: ACTIVE | Noted: 2021-06-08

## 2021-06-09 PROBLEM — I50.43 SYSTOLIC AND DIASTOLIC CHF, ACUTE ON CHRONIC (HCC): Status: ACTIVE | Noted: 2021-06-08

## 2021-06-09 LAB
ALBUMIN SERPL-MCNC: 3.4 G/DL (ref 3.5–5.2)
ALP BLD-CCNC: 180 U/L (ref 35–104)
ALT SERPL-CCNC: 38 U/L (ref 0–32)
ANION GAP SERPL CALCULATED.3IONS-SCNC: 5 MMOL/L (ref 7–16)
ANISOCYTOSIS: ABNORMAL
AST SERPL-CCNC: 21 U/L (ref 0–31)
B.E.: 10.2 MMOL/L (ref -3–3)
BASOPHILS ABSOLUTE: 0.02 E9/L (ref 0–0.2)
BASOPHILS RELATIVE PERCENT: 0.3 % (ref 0–2)
BILIRUB SERPL-MCNC: <0.2 MG/DL (ref 0–1.2)
BILIRUBIN DIRECT: <0.2 MG/DL (ref 0–0.3)
BILIRUBIN, INDIRECT: ABNORMAL MG/DL (ref 0–1)
BUN BLDV-MCNC: 25 MG/DL (ref 6–23)
C-REACTIVE PROTEIN: 1.6 MG/DL (ref 0–0.4)
CALCIUM SERPL-MCNC: 8.6 MG/DL (ref 8.6–10.2)
CHLORIDE BLD-SCNC: 103 MMOL/L (ref 98–107)
CO2: 37 MMOL/L (ref 22–29)
COHB: 1.2 % (ref 0–1.5)
COMMENT: ABNORMAL
CREAT SERPL-MCNC: 0.8 MG/DL (ref 0.5–1)
CRITICAL: ABNORMAL
DATE ANALYZED: ABNORMAL
DATE OF COLLECTION: ABNORMAL
EOSINOPHILS ABSOLUTE: 0.2 E9/L (ref 0.05–0.5)
EOSINOPHILS RELATIVE PERCENT: 2.7 % (ref 0–6)
GFR AFRICAN AMERICAN: >60
GFR NON-AFRICAN AMERICAN: >60 ML/MIN/1.73
GLUCOSE BLD-MCNC: 100 MG/DL (ref 74–99)
HCO3: 37.7 MMOL/L (ref 22–26)
HCT VFR BLD CALC: 34.5 % (ref 34–48)
HEMOGLOBIN: 10.1 G/DL (ref 11.5–15.5)
HHB: 1.3 % (ref 0–5)
IMMATURE GRANULOCYTES #: 0.03 E9/L
IMMATURE GRANULOCYTES %: 0.4 % (ref 0–5)
LAB: ABNORMAL
LYMPHOCYTES ABSOLUTE: 0.45 E9/L (ref 1.5–4)
LYMPHOCYTES RELATIVE PERCENT: 6.2 % (ref 20–42)
Lab: ABNORMAL
MAGNESIUM: 1.9 MG/DL (ref 1.6–2.6)
MCH RBC QN AUTO: 29.4 PG (ref 26–35)
MCHC RBC AUTO-ENTMCNC: 29.3 % (ref 32–34.5)
MCV RBC AUTO: 100.6 FL (ref 80–99.9)
METHB: 0.3 % (ref 0–1.5)
MODE: ABNORMAL
MONOCYTES ABSOLUTE: 0.83 E9/L (ref 0.1–0.95)
MONOCYTES RELATIVE PERCENT: 11.4 % (ref 2–12)
NEUTROPHILS ABSOLUTE: 5.75 E9/L (ref 1.8–7.3)
NEUTROPHILS RELATIVE PERCENT: 79 % (ref 43–80)
O2 CONTENT: 15.5 ML/DL
O2 SATURATION: 98.7 % (ref 92–98.5)
O2HB: 97.2 % (ref 94–97)
OPERATOR ID: ABNORMAL
OVALOCYTES: ABNORMAL
PATIENT TEMP: 37 C
PCO2: 67.4 MMHG (ref 35–45)
PDW BLD-RTO: 15.1 FL (ref 11.5–15)
PH BLOOD GAS: 7.37 (ref 7.35–7.45)
PHOSPHORUS: 3.1 MG/DL (ref 2.5–4.5)
PLATELET # BLD: 281 E9/L (ref 130–450)
PMV BLD AUTO: 10 FL (ref 7–12)
PO2: 127.8 MMHG (ref 75–100)
POIKILOCYTES: ABNORMAL
POLYCHROMASIA: ABNORMAL
POTASSIUM SERPL-SCNC: 4 MMOL/L (ref 3.5–5)
RBC # BLD: 3.43 E12/L (ref 3.5–5.5)
SEDIMENTATION RATE, ERYTHROCYTE: 15 MM/HR (ref 0–20)
SODIUM BLD-SCNC: 145 MMOL/L (ref 132–146)
SOURCE, BLOOD GAS: ABNORMAL
THB: 11.2 G/DL (ref 11.5–16.5)
TIME ANALYZED: 1420
TOTAL PROTEIN: 6.8 G/DL (ref 6.4–8.3)
WBC # BLD: 7.3 E9/L (ref 4.5–11.5)

## 2021-06-09 PROCEDURE — 6370000000 HC RX 637 (ALT 250 FOR IP): Performed by: INTERNAL MEDICINE

## 2021-06-09 PROCEDURE — 85025 COMPLETE CBC W/AUTO DIFF WBC: CPT

## 2021-06-09 PROCEDURE — 2580000003 HC RX 258: Performed by: INTERNAL MEDICINE

## 2021-06-09 PROCEDURE — 97161 PT EVAL LOW COMPLEX 20 MIN: CPT

## 2021-06-09 PROCEDURE — 84100 ASSAY OF PHOSPHORUS: CPT

## 2021-06-09 PROCEDURE — 80076 HEPATIC FUNCTION PANEL: CPT

## 2021-06-09 PROCEDURE — 82805 BLOOD GASES W/O2 SATURATION: CPT

## 2021-06-09 PROCEDURE — 83735 ASSAY OF MAGNESIUM: CPT

## 2021-06-09 PROCEDURE — 36415 COLL VENOUS BLD VENIPUNCTURE: CPT

## 2021-06-09 PROCEDURE — 86140 C-REACTIVE PROTEIN: CPT

## 2021-06-09 PROCEDURE — 85651 RBC SED RATE NONAUTOMATED: CPT

## 2021-06-09 PROCEDURE — 2500000003 HC RX 250 WO HCPCS: Performed by: INTERNAL MEDICINE

## 2021-06-09 PROCEDURE — 80048 BASIC METABOLIC PNL TOTAL CA: CPT

## 2021-06-09 PROCEDURE — 2060000000 HC ICU INTERMEDIATE R&B

## 2021-06-09 RX ORDER — CEPHALEXIN 250 MG/1
250 CAPSULE ORAL EVERY 8 HOURS SCHEDULED
Status: DISCONTINUED | OUTPATIENT
Start: 2021-06-09 | End: 2021-06-13 | Stop reason: HOSPADM

## 2021-06-09 RX ADMIN — APIXABAN 5 MG: 5 TABLET, FILM COATED ORAL at 08:26

## 2021-06-09 RX ADMIN — CEPHALEXIN 250 MG: 250 CAPSULE ORAL at 14:27

## 2021-06-09 RX ADMIN — AMIODARONE HYDROCHLORIDE 200 MG: 200 TABLET ORAL at 08:26

## 2021-06-09 RX ADMIN — APIXABAN 5 MG: 5 TABLET, FILM COATED ORAL at 20:18

## 2021-06-09 RX ADMIN — CEPHALEXIN 250 MG: 250 CAPSULE ORAL at 21:31

## 2021-06-09 RX ADMIN — BUMETANIDE 1 MG: 0.25 INJECTION INTRAMUSCULAR; INTRAVENOUS at 06:10

## 2021-06-09 RX ADMIN — DEXTROSE MONOHYDRATE 10 MG/HR: 50 INJECTION, SOLUTION INTRAVENOUS at 14:27

## 2021-06-09 RX ADMIN — BUMETANIDE 0.5 MG/HR: 0.25 INJECTION, SOLUTION INTRAMUSCULAR; INTRAVENOUS at 14:27

## 2021-06-09 ASSESSMENT — PAIN SCALES - GENERAL
PAINLEVEL_OUTOF10: 0

## 2021-06-09 NOTE — CARE COORDINATION
Cardizem drip continued. Initiated on Bumex drip. Requiring O2 4 liters NC-DOES NOT wear home O2- [employer] following- will need O2 testing/ order if unable to wean off at discharge. PT am-pac 20. Plan remains to return home w/ daughter on discharge- declining [facility]. Pt has [employer].  Will follow Miri Arteaga RN case manager

## 2021-06-09 NOTE — PROGRESS NOTES
PROGRESS  NOTE --                                                          INTERNAL  MEDICINE                                                                              I  PERSONALLY SAW , EXAMINED, AND CARED [address] [facility], 6/9/2021     LABS, XRAY ,CHART, AND MEDICATIONS  REVIEWED BY ME       Chief complaint: Short of breath, leg edema      6/9/2021-SUBJECTIVE: Chase Ramos is alert awake and cooperative; oriented ×3. Denies any chest pain dyspnea nausea emesis. Tolerating diet. No abdominal pain. Sitting in bedside chair; daughter present through the exam.  Patient feels well hoping for discharge. Daughter states patient has had episodes of shortness of breath and fatigue off and on for last month or 2, no chest pain. Over nothing as bad as symptoms at the time of this admission. Afebrile last 24 hours. Most recent pulse 138 blood pressure 110/65.  96% saturation 4 L nasal cannula. Patient dropped to SPO2 of 80 on room air earlier this morning. Intake and output -2144 cc. CO2 37 BUN 25 creatinine 0.8. Glucose 100 CRP 1.6 albumin 3.4 alkaline phosphatase 180 ALT 38. WBC 7.7 hemoglobin 10.1 platelets 356. A1c 5.6 TSH 2.400. Sed rate 15. Viral respiratory panel, SARS-CoV-2 all not detected. Doppler/ultrasound both lower extremities negative for DVT. 2D echo completed with following results--        Summary   Left ventricle grossly normal in size. Global hypokinesis is noted to be severe. Estimated left ventricular ejection fraction is 28±5%. Normal left ventricular wall thickness. Diastolic function cannot be accurately assessed due to significant mitral   regurgitation. The left atrium is moderately dilated. The LAESV Index is >34 ml/m2. Normal right ventricular size. Right ventricle global systolic function is mildly reduced .    TAPSE is not noted   Trace-mild aortic regurgitation is noted.   Increased E point septal separation noted,suggesting decreased LV cardiac   output. Moderate to severe mitral regurgitation is present. PISA radius >1.0-severe   Mild tricuspid regurgitation. RVSP is 29 mmHg. Physiologic and/or trace pulmonic regurgitation present. Technically good quality study. No comparison study available. Suggest clinical correlation. Cardiology consult from yesterday appreciated; amiodarone has been started; patient has finally agreed to taking apixaban by mouth.  note from yesterday; patient was given a coupon for 30-day supply of apixaban. Physical therapy and PAC score from today 20/24.   Urine culture as follows--      Component Collected Lab   Organism Abnormal  06/07/2021  9:41 PM [facility] - [facility]   Escherichia coli    Urine Culture, Routine 06/07/2021  9:41 PM  - [facility]   >100,000 CFU/ml   Sensitivity to follow          Objective:     PHYSICAL EXAM:    VS: /65   Pulse 138   Temp 98.5 °F (36.9 °C) (Oral)   Resp 16   Ht 5' 6\" (1.676 m)   Wt 90 lb 12.8 oz (41.2 kg)   SpO2 96%   BMI 14.66 kg/m²     Labs:   CBC:   Lab Results   Component Value Date    WBC 7.3 06/09/2021    RBC 3.43 06/09/2021    HGB 10.1 06/09/2021    HCT 34.5 06/09/2021    .6 06/09/2021    MCH 29.4 06/09/2021    MCHC 29.3 06/09/2021    RDW 15.1 06/09/2021     06/09/2021    MPV 10.0 06/09/2021     CBC with Differential:    Lab Results   Component Value Date    WBC 7.3 06/09/2021    RBC 3.43 06/09/2021    HGB 10.1 06/09/2021    HCT 34.5 06/09/2021     06/09/2021    .6 06/09/2021    MCH 29.4 06/09/2021    MCHC 29.3 06/09/2021    RDW 15.1 06/09/2021    LYMPHOPCT 6.2 06/09/2021    MONOPCT 11.4 06/09/2021    BASOPCT 0.3 06/09/2021    MONOSABS 0.83 06/09/2021    LYMPHSABS 0.45 06/09/2021    EOSABS 0.20 06/09/2021    BASOSABS 0.02 06/09/2021     Hemoglobin/Hematocrit:    Lab Results   Component Value Date    HGB 10.1 06/09/2021    HCT 34.5 06/09/2021     CMP:    Lab Results   Component Value Date     06/09/2021    K 4.0 06/09/2021    K 5.2 06/07/2021     06/09/2021    CO2 37 06/09/2021    BUN 25 06/09/2021    CREATININE 0.8 06/09/2021    GFRAA >60 06/09/2021    LABGLOM >60 06/09/2021    GLUCOSE 100 06/09/2021    PROT 6.8 06/09/2021    LABALBU 3.4 06/09/2021    CALCIUM 8.6 06/09/2021    BILITOT <0.2 06/09/2021    ALKPHOS 180 06/09/2021    AST 21 06/09/2021    ALT 38 06/09/2021     BMP:    Lab Results   Component Value Date     06/09/2021    K 4.0 06/09/2021    K 5.2 06/07/2021     06/09/2021    CO2 37 06/09/2021    BUN 25 06/09/2021    LABALBU 3.4 06/09/2021    CREATININE 0.8 06/09/2021    CALCIUM 8.6 06/09/2021    GFRAA >60 06/09/2021    LABGLOM >60 06/09/2021    GLUCOSE 100 06/09/2021     Hepatic Function Panel:    Lab Results   Component Value Date    ALKPHOS 180 06/09/2021    ALT 38 06/09/2021    AST 21 06/09/2021    PROT 6.8 06/09/2021    BILITOT <0.2 06/09/2021    BILIDIR <0.2 06/09/2021    IBILI see below 06/09/2021    LABALBU 3.4 06/09/2021     Ionized Calcium:  No results found for: IONCA  Magnesium:    Lab Results   Component Value Date    MG 1.9 06/09/2021     Phosphorus:    Lab Results   Component Value Date    PHOS 3.1 06/09/2021     LDH:  No results found for: LDH  Uric Acid:    Lab Results   Component Value Date    LABURIC 4.5 06/08/2021     PT/INR:    Lab Results   Component Value Date    PROTIME 10.9 12/13/2016    INR 1.0 12/13/2016     Warfarin PT/INR:  No components found for: PTPATWAR, PTINRWAR  PTT:    Lab Results   Component Value Date    APTT 26.8 12/13/2016   [APTT}  Troponin:  No results found for: TROPONINI  Last 3 Troponin:  No results found for: TROPONINI  U/A:    Lab Results   Component Value Date    COLORU Yellow 11/23/2015    PROTEINU Negative 11/23/2015    PHUR 6.0 11/23/2015    WBCUA 5-10 11/23/2015    RBCUA 0-1 11/23/2015    BACTERIA MANY 11/23/2015    CLARITYU Clear 11/23/2015    SPECGRAV 1.025 11/23/2015    LEUKOCYTESUR Negative 11/23/2015    UROBILINOGEN 0.2 11/23/2015    BILIRUBINUR Negative 11/23/2015    BLOODU MODERATE 11/23/2015    GLUCOSEU Negative 11/23/2015     ABG:  No results found for: PH, PCO2, PO2, HCO3, BE, THGB, TCO2, O2SAT  HgBA1c:    Lab Results   Component Value Date    LABA1C 5.6 06/08/2021     FLP:    Lab Results   Component Value Date    TRIG 74 06/08/2021    HDL 44 06/08/2021    LDLCALC 61 06/08/2021    LABVLDL 15 06/08/2021     TSH:    Lab Results   Component Value Date    TSH 2.400 06/08/2021     VITAMIN B12: No components found for: B12  FOLATE:    Lab Results   Component Value Date    FOLATE 20.0 06/08/2021     IRON:    Lab Results   Component Value Date    IRON 38 06/08/2021     Iron Saturation:  No components found for: PERCENTFE  TIBC:    Lab Results   Component Value Date    TIBC 251 06/08/2021     FERRITIN:    Lab Results   Component Value Date    FERRITIN 82 06/08/2021        General appearance: Alert, Awake, Oriented times 3, no distress; nasal cannula oxygen in place  Skin: Warm and dry ; no rashes  Head: Normocephalic. No masses, lesions or tenderness noted  Eyes: Conjunctivae pink, sclera white. PERRL,EOM-I  Ears: External ears normal; mildly hard of hearing  Nose/Sinuses: Nares normal. Septum midline. Mucosa normal. No drainage  Oropharynx: Oropharynx clear with no exudate seen  Neck: Supple. No jugular venous distension, lymphadenopathy or thyromegaly Trachea midline  Lungs: Rales at both bases  Heart: Irregularly irregular at 130/min  Abdomen: Soft, non-tender. BS normal. No masses, organomegaly; no rebound or guarding  Extremities: 1+ edema, Peripheral pulses palpable  Musculoskeletal: Muscular strength appears intact. Neuro:  No focal motor defects ; II-XII grossly intact .  KERN equally    TELEMETRY: REVIEWED--Telemetry: Atrial fibrillation and Rapid ventricular response    ASSESSMENT:   Principal Problem:    Atrial fibrillation with RVR ([facility]  Active Problems:    Pulmonary edema cardiac cause (HCC)    Hepatic congestion    Bilateral hearing loss  Resolved Problems:    * No resolved hospital problems. *      PLAN:  SEE ORDERS      RE  CHANGES AND FINDINGS   Medications reviewed with patient  GI prophylaxis  DVT prophylaxis  Consultants notes reviewed    Amiodarone 200 mg by mouth daily  Diltiazem drip continues  Apixaban 5 mg twice daily  ABG in view of elevated CO2, venous  Discontinue bumetanide twice daily  Begin bumetanide drip 0.5 mg/h  Keflex 250 mg 3 times daily for UTI until sensitivities available      Discussed with patient and adult child and nursing. Chuck Storm,   DO     12:45 PM     6/9/2021    TIME > 25 MINUTES    >  50 %  OF  TIME  DISCUSSION               ------------  INFORMATION  -----------      DIET:ADULT DIET; Regular; No Added Salt (3-4 gm)      No Known Allergies      MEDICATION SIDE EFFECTS:none       SCHEDULED MEDS:                                 Scheduled Meds:   amiodarone  200 mg Oral Daily    pantoprazole  40 mg Oral QAM AC    apixaban  5 mg Oral BID    bumetanide  1 mg Intravenous Q12H       Continuous Infusions:   dilTIAZem 10 mg/hr (06/09/21 1022)         Data:       Intake/Output Summary (Last 24 hours) at 6/9/2021 1245  Last data filed at 6/9/2021 0829  Gross per 24 hour   Intake 316 ml   Output 2700 ml   Net -2384 ml       Wt Readings from Last 3 Encounters:   06/09/21 90 lb 12.8 oz (41.2 kg)   03/07/19 90 lb (40.8 kg)   12/13/16 93 lb (42.2 kg)       Labs: Additional    GLUCOSE:No results for input(s): POCGLU in the last 72 hours.     BNP:No results found for: BNP    CRP:   Recent Labs     06/07/21  1810 06/08/21  0340 06/09/21  0345   CRP 2.3* 1.9* 1.6*       ESR:  Recent Labs     06/07/21  1810 06/08/21  0340 06/09/21  0345   SEDRATE 15 19 15       RADIOLOGY: REVIEWED AVAILABLE REPORT  US DUP LOWER EXTREMITIES BILATERAL VENOUS   Final Result   No evidence of DVT in either lower

## 2021-06-09 NOTE — PROGRESS NOTES
Physical Therapy    Facility/Department: [address] INTERNAL MEDICINE 2  Initial Assessment    NAME: Roslynn Najjar  : 1945  MRN: [id number]    Date of Service: 2021      Patient Diagnosis(es): The primary encounter diagnosis was Atrial fibrillation with RVR ([facility] [address] A diagnosis of Acute pulmonary edema (HCC) was also pertinent to this visit. has a past medical history of Atrial fibrillation with RVR ([facility] [address] Bilateral hearing loss, Hepatic congestion, and Pulmonary edema cardiac cause ([address] has a past surgical history that includes fracture surgery (). Evaluating Therapist: Denzel Apley PT    Room #:  4925/1746-S  Diagnosis:  Atrial fibrillation with RVR ([facility] [address] [I48.91]  Precautions:  Falls, O2      Social:  Pt lives with daughter  in an apartment with 18 steps to enter. Prior to admission independent all areas without device. Drives, works at [employer] Does not use oxygen at home     Initial Evaluation  Date: 21 Treatment      Short Term/ Long Term   Goals   Was pt agreeable to Eval/treatment? yes     Does pt have pain? no     Bed Mobility  Rolling: independent  Supine to sit: independent  Sit to supine: independent  Scooting: independent  independent   Transfers Sit to stand: supervision  Stand to sit: supervision  Stand pivot: supervision  independent   Ambulation    200 feet with no device with SBA  400 feet with no device independent   Stair Negotiation  Ascended and descended  NT   18 steps with 1 rail independent   LE strength     4-/5    4/5   balance      Fair+     AM-PAC Raw score               20/24         Pt is alert and Oriented   LE ROM: WFl  Sensation: intact  Edema: none  Chair alarm: no     ASSESSMENT:    Pt displays functional ability as noted in the objective portion of this evaluation.       Patient education  Pt educated on PT objectives    Patient response to education:   Pt verbalized understanding Pt demonstrated skill Pt requires further education in this area   yes           Comments:  Pt quick moving and slightly impulsive, getting tangled in O2 and IV lines. Mildly unsteady with ambulation, loss of balance x2 but pt able to self correct. SpO2 on 4L after ambulation 97%       Pt's/ family goals   1. To go home today    Conditions Requiring Skilled Therapeutic Intervention:    [x]Decreased strength     []Decreased ROM  [x]Decreased functional mobility  [x]Decreased balance   [x]Decreased endurance   []Decreased posture  []Decreased sensation  []Decreased coordination   []Decreased vision  []Decreased safety awareness   []Increased pain       Patient and or family understand(s) diagnosis, prognosis, and plan of care. Prognosis is good for reaching above PT goals    PHYSICAL THERAPY PLAN OF CARE:    PT POC is established based on physician order and patient diagnosis     Referring provider/PT Order:  Gera Storm, DO / PT eval and treat  Diagnosis:  Atrial fibrillation with RVR ([facility] [address] [I48.91]    Current Treatment Recommendations:     [x] Strengthening to improve independence with functional mobility   [] ROM to improve independence with functional mobility   [x] Balance Training to improve static/dynamic balance and to reduce fall risk  [x] Endurance Training to improve activity tolerance during functional mobility   [x] Transfer Training to improve safety and independence with all functional transfers   [x] Gait Training to improve gait mechanics, endurance and assess need for appropriate assistive device  [x] Stair Training in preparation for safe discharge home and/or into the community   [] Positioning to prevent skin breakdown and contractures  [x] Safety and Education Training   [x] Patient/Caregiver Education   [] HEP  [] Other     PT long term treatment goals are located in above grid    Frequency of treatments: 2-5x/week x 3-5 days.     Time in  0840  Time out  0856        Evaluation Time includes thorough review of current medical information, gathering information on past medical history/social history and prior level of function, completion of standardized testing/informal observation of tasks, assessment of data and education on plan of care and goals.       CPT codes:  [x] Low Complexity PT evaluation 28957  [] Moderate Complexity PT evaluation 53682  [] High Complexity PT evaluation 02943  [] PT Re-evaluation 75517  [] Gait training 49064 minutes  [] Manual therapy 79291 minutes  [] Therapeutic activities 13379 minutes  [] Therapeutic exercises 42933 minutes  [] Neuromuscular reeducation 45410 minutes     [facility] PSYCHIATRY PT [id number]

## 2021-06-10 ENCOUNTER — APPOINTMENT (OUTPATIENT)
Dept: CT IMAGING | Age: 76
DRG: 308 | End: 2021-06-10
Payer: MEDICARE

## 2021-06-10 PROBLEM — J44.9 COPD (CHRONIC OBSTRUCTIVE PULMONARY DISEASE) (HCC): Status: ACTIVE | Noted: 2021-06-10

## 2021-06-10 LAB
ALBUMIN SERPL-MCNC: 3.6 G/DL (ref 3.5–5.2)
ALP BLD-CCNC: 176 U/L (ref 35–104)
ALT SERPL-CCNC: 33 U/L (ref 0–32)
ANION GAP SERPL CALCULATED.3IONS-SCNC: 7 MMOL/L (ref 7–16)
AST SERPL-CCNC: 21 U/L (ref 0–31)
BASOPHILS ABSOLUTE: 0 E9/L (ref 0–0.2)
BASOPHILS RELATIVE PERCENT: 0 % (ref 0–2)
BILIRUB SERPL-MCNC: 0.3 MG/DL (ref 0–1.2)
BILIRUBIN DIRECT: <0.2 MG/DL (ref 0–0.3)
BILIRUBIN, INDIRECT: ABNORMAL MG/DL (ref 0–1)
BUN BLDV-MCNC: 22 MG/DL (ref 6–23)
C-REACTIVE PROTEIN: 2 MG/DL (ref 0–0.4)
CALCIUM SERPL-MCNC: 8.4 MG/DL (ref 8.6–10.2)
CHLORIDE BLD-SCNC: 92 MMOL/L (ref 98–107)
CO2: 43 MMOL/L (ref 22–29)
CREAT SERPL-MCNC: 0.8 MG/DL (ref 0.5–1)
EOSINOPHILS ABSOLUTE: 0.14 E9/L (ref 0.05–0.5)
EOSINOPHILS RELATIVE PERCENT: 1.7 % (ref 0–6)
GFR AFRICAN AMERICAN: >60
GFR NON-AFRICAN AMERICAN: >60 ML/MIN/1.73
GLUCOSE BLD-MCNC: 89 MG/DL (ref 74–99)
HCT VFR BLD CALC: 36.5 % (ref 34–48)
HEMOGLOBIN: 10.6 G/DL (ref 11.5–15.5)
HYPOCHROMIA: ABNORMAL
LYMPHOCYTES ABSOLUTE: 0.33 E9/L (ref 1.5–4)
LYMPHOCYTES RELATIVE PERCENT: 4.4 % (ref 20–42)
MAGNESIUM: 1.6 MG/DL (ref 1.6–2.6)
MCH RBC QN AUTO: 29.1 PG (ref 26–35)
MCHC RBC AUTO-ENTMCNC: 29 % (ref 32–34.5)
MCV RBC AUTO: 100.3 FL (ref 80–99.9)
MONOCYTES ABSOLUTE: 0.16 E9/L (ref 0.1–0.95)
MONOCYTES RELATIVE PERCENT: 1.8 % (ref 2–12)
NEUTROPHILS ABSOLUTE: 7.54 E9/L (ref 1.8–7.3)
NEUTROPHILS RELATIVE PERCENT: 92.1 % (ref 43–80)
NUCLEATED RED BLOOD CELLS: 0 /100 WBC
ORGANISM: ABNORMAL
OVALOCYTES: ABNORMAL
PDW BLD-RTO: 14.9 FL (ref 11.5–15)
PHOSPHORUS: 2.7 MG/DL (ref 2.5–4.5)
PLATELET # BLD: 304 E9/L (ref 130–450)
PMV BLD AUTO: 9.8 FL (ref 7–12)
POIKILOCYTES: ABNORMAL
POLYCHROMASIA: ABNORMAL
POTASSIUM SERPL-SCNC: 3.9 MMOL/L (ref 3.5–5)
RBC # BLD: 3.64 E12/L (ref 3.5–5.5)
SEDIMENTATION RATE, ERYTHROCYTE: 31 MM/HR (ref 0–20)
SODIUM BLD-SCNC: 142 MMOL/L (ref 132–146)
TOTAL PROTEIN: 7.5 G/DL (ref 6.4–8.3)
URINE CULTURE, ROUTINE: ABNORMAL
WBC # BLD: 8.2 E9/L (ref 4.5–11.5)

## 2021-06-10 PROCEDURE — 36415 COLL VENOUS BLD VENIPUNCTURE: CPT

## 2021-06-10 PROCEDURE — 85651 RBC SED RATE NONAUTOMATED: CPT

## 2021-06-10 PROCEDURE — 84100 ASSAY OF PHOSPHORUS: CPT

## 2021-06-10 PROCEDURE — 6370000000 HC RX 637 (ALT 250 FOR IP): Performed by: INTERNAL MEDICINE

## 2021-06-10 PROCEDURE — 2060000000 HC ICU INTERMEDIATE R&B

## 2021-06-10 PROCEDURE — 71250 CT THORAX DX C-: CPT

## 2021-06-10 PROCEDURE — 85025 COMPLETE CBC W/AUTO DIFF WBC: CPT

## 2021-06-10 PROCEDURE — 2500000003 HC RX 250 WO HCPCS: Performed by: INTERNAL MEDICINE

## 2021-06-10 PROCEDURE — 80076 HEPATIC FUNCTION PANEL: CPT

## 2021-06-10 PROCEDURE — 83735 ASSAY OF MAGNESIUM: CPT

## 2021-06-10 PROCEDURE — 80048 BASIC METABOLIC PNL TOTAL CA: CPT

## 2021-06-10 PROCEDURE — 86140 C-REACTIVE PROTEIN: CPT

## 2021-06-10 PROCEDURE — 97165 OT EVAL LOW COMPLEX 30 MIN: CPT

## 2021-06-10 RX ORDER — AMIODARONE HYDROCHLORIDE 200 MG/1
200 TABLET ORAL 2 TIMES DAILY
Status: DISCONTINUED | OUTPATIENT
Start: 2021-06-10 | End: 2021-06-13 | Stop reason: HOSPADM

## 2021-06-10 RX ORDER — TORSEMIDE 20 MG/1
20 TABLET ORAL DAILY
Status: DISCONTINUED | OUTPATIENT
Start: 2021-06-11 | End: 2021-06-13 | Stop reason: HOSPADM

## 2021-06-10 RX ORDER — METOPROLOL SUCCINATE 25 MG/1
12.5 TABLET, EXTENDED RELEASE ORAL DAILY
Status: DISCONTINUED | OUTPATIENT
Start: 2021-06-10 | End: 2021-06-13 | Stop reason: HOSPADM

## 2021-06-10 RX ORDER — TORSEMIDE 10 MG/1
10 TABLET ORAL DAILY
Status: DISCONTINUED | OUTPATIENT
Start: 2021-06-10 | End: 2021-06-10

## 2021-06-10 RX ADMIN — SACUBITRIL AND VALSARTAN 0.5 TABLET: 24; 26 TABLET, FILM COATED ORAL at 13:54

## 2021-06-10 RX ADMIN — APIXABAN 5 MG: 5 TABLET, FILM COATED ORAL at 20:43

## 2021-06-10 RX ADMIN — METOPROLOL SUCCINATE 12.5 MG: 25 TABLET, EXTENDED RELEASE ORAL at 13:53

## 2021-06-10 RX ADMIN — AMIODARONE HYDROCHLORIDE 200 MG: 200 TABLET ORAL at 10:01

## 2021-06-10 RX ADMIN — AMIODARONE HYDROCHLORIDE 200 MG: 200 TABLET ORAL at 20:43

## 2021-06-10 RX ADMIN — CEPHALEXIN 250 MG: 250 CAPSULE ORAL at 13:53

## 2021-06-10 RX ADMIN — APIXABAN 5 MG: 5 TABLET, FILM COATED ORAL at 10:01

## 2021-06-10 RX ADMIN — CEPHALEXIN 250 MG: 250 CAPSULE ORAL at 06:48

## 2021-06-10 RX ADMIN — CEPHALEXIN 250 MG: 250 CAPSULE ORAL at 20:43

## 2021-06-10 RX ADMIN — DEXTROSE MONOHYDRATE 10 MG/HR: 50 INJECTION, SOLUTION INTRAVENOUS at 01:51

## 2021-06-10 RX ADMIN — SACUBITRIL AND VALSARTAN 0.5 TABLET: 24; 26 TABLET, FILM COATED ORAL at 20:43

## 2021-06-10 RX ADMIN — PANTOPRAZOLE SODIUM 40 MG: 40 TABLET, DELAYED RELEASE ORAL at 06:47

## 2021-06-10 RX ADMIN — TORSEMIDE 10 MG: 10 TABLET ORAL at 13:54

## 2021-06-10 ASSESSMENT — PAIN SCALES - GENERAL
PAINLEVEL_OUTOF10: 0

## 2021-06-10 NOTE — CONSULTS
Pulmonary Consultation    Admit Date: 6/7/2021  Requesting Physician: Breann Yoder MD    Reason for consultation:  · Hypercapnia  HPI:  · The patient is a 60-year-old female who presented to the hospital with increasing shortness of breath. Seen and evaluated emergency room, the patient was noted to be in atrial fibrillation with a rapid ventricular response. Anticoagulation was recommended and refused. Eventually, the patient acquiesced. In addition, rate controlling drugs were used initially culminating in a switch to amiodarone after cardiology consultation. · Chest radiograph evidence some degree of hyperinflation. Arterial blood gases evidence significant hypercapnia but also demonstrated both a primary metabolic alkalosis with respiratory compensation as well as a primary respiratory acidosis given the normal pH. · Patient herself is a lifelong non-smoker. She worked as a  her whole life. She has no family history of lung disease. A CT scan of the chest done about 10 years ago evidenced significant emphysematous changes bilaterally. PMH:    Past Medical History:   Diagnosis Date    Atrial fibrillation with RVR ([address] 06/07/2021    Bilateral hearing loss     COPD (chronic obstructive pulmonary disease) (HCC) 6/10/2021    Hepatic congestion 06/07/2021    Due to CHF    Moderate to severe mitral regurgitation 06/08/2021    Pulmonary edema cardiac cause ([address] 65/11/7140    Systolic and diastolic CHF, acute on chronic ([address] 06/08/2021     PSH:   Past Surgical History:   Procedure Laterality Date    FRACTURE SURGERY  11/24/215    right hip       Review of Systems:   · Constitutional: As noted in the HPI. · Eyes: No visual changes or diplopia. No scleral icterus. · ENT: No headaches, hearing loss or vertigo. No nasal congestion, or sore throat. · Cardiovascular: No chest pain, dyspnea on exertion, or palpitations.   · Respiratory: See above  · Gastrointestinal: excluded. 2. Chronic hypercapnic respiratory failure. Given the patient's BMI this is clearly not obesity hypoventilation likely represents underlying lung disease. 3. Atrial fibrillation with rapid ventricular response: Per cardiology  4. Decreased left ventricular ejection fraction  5. Mitral regurgitation  6. Mild PAH      Plan:  1. Check pulmonary functions if available  2. Await CT scan of the chest.  3. Will hold off empiric treatment for COPD which would include anticholinergics and beta agonists in view of the patient's atrial fibrillation and rapid ventricular response. Thanks for letting us see this patient in consultation. Total time in reviewing the previous admissions and records, reviewing the current x-rays, labs, and discussing with clinical staff including nursing and physicians, exceeded 50 minutes. Please contact us with any questions. Office [phone] or after hours through [employer], x [phone]. Please note that voice recognition technology was used (while wearing a Covid mandated mask) in the preparation of this note and make therefore it may contain inadvertent transcription errors. If the patient is a COVID 19 isolation patient, the above physical exam reflects that of the examining physician for the day. Jo Toribio MD,  M.D., F.C.C.P.     [facility] [place], [address] [place] [address], [address] [place] [address]

## 2021-06-10 NOTE — PROGRESS NOTES
Occupational Therapy  OCCUPATIONAL THERAPY INITIAL EVALUATION  [address]    Date: 6/10/2021     Patient Name: Roxana Brandt  MRN: [id number]  : 1945  Room: [address] [place] [address]    Evaluating OT: Jony Page, OTR/L - .[id number]    Referring Provider: Aletha Cabezas DO  Specific Provider Orders/Date: \"OT eval and treat\" - 2021    Diagnosis: Atrial fibrillation with RVR ([facility] [I48.91]      Pertinent Medical History: COPD, a-fib with RVR, CHF     Precautions: fall risk, O2 via nasal cannula, hearing impairment, bed/chair alarm waiver    Assessment of Current Deficits:    [x] Functional mobility   [x]ADLs  [x] Strength               [x]Cognition   [x] Functional transfers   [x] IADLs         [x] Safety Awareness   [x]Endurance   [] Fine Coordination              [x] Balance      [] Vision/perception   [x]Sensation    []Gross Motor Coordination  [] ROM  [] Delirium                   [] Motor Control     OT PLAN OF CARE   OT POC is based on physician orders, patient diagnosis, and results of clinical assessment.   Frequency/Duration 2-5 days/week for 2 weeks PRN   Specific OT Treatment Interventions to Include:  * Instruction/training on adapted ADL techniques and AE recommendations to increase functional independence within precautions  * Training on energy conservation strategies, correct breathing pattern and techniques to improve independence/tolerance for self-care routine  * Functional transfer/mobility training/DME recommendations for increased independence, safety, and fall prevention  * Patient/Family education to increase follow through with safety techniques and functional independence  * Recommendation of environmental modifications for increased safety with functional transfers/mobility and ADLs  * Therapeutic exercise to improve motor endurance, ROM, and functional strength for ADLs/functional transfers  * Therapeutic activities to facilitate/challenge dynamic balance, stand tolerance for increased safety and independence with ADLs  * Neuro-muscular re-education: facilitation of righting/equilibrium reactions, midline orientation, scapular stability/mobility, normalization of muscle tone, and facilitation of volitional active controled movement    Recommended Adaptive Equipment: TBD    Home Living: Patient lives with her daughter in a one-floor apartment (5 + 13 steps to access their apartment); patient noted that they take their laundry to a laundromat. Bathroom Setup: tub shower (no seat, no grab bars)  Equipment Owned: N/A    Prior Level of Function (PLOF): Patient reported that she was independent with ADLs, some IADLs, and functional mobility (without device) prior to this hospitalization. Patient noted that her daughter assists with shopping, laundry, and other IADLs, as needed. 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 Pain Level: Patient denied experiencing pain. Cognition: Patient alert and oriented x3. WFL command follow demonstrated. Patient pleasant, cooperative, and motivated to return to [facility] and home environment. Fair insight to current abilities/limitations. Memory: WFL  Sequencing: WFL  Problem Solving: WFL grossly  Judgement/Safety: Fair   Impulsivity demonstrated occasionally.     Functional Assessment:  AM-PAC Daily Activity Raw Score: 19/24   Initial Eval Status  Date: 6/10/2021 Treatment Status  Date:  Short Term Goals = Long Term Goals   Feeding Independent     Grooming SBA  Mod I / Independent  (seated/standing at sink)   UB Dressing Setup  Mod I / Independent   LB Dressing SBA  Mod I / Independent - with use of AE, as needed/appropriate   Bathing SBA  Mod I / Independent - with use of AE/DME, as needed/appropriate   Toileting Supervision with all aspects of toileting during this session. Independent / Mod I   Bed Mobility  Supine-to-Sit: Independent     Functional Transfers Sit-to-Stand: Supervision   from EOB  Independent   Functional Mobility SBA - Supervision   (without device) within patient's room and bathroom. Assistance given occasionally to ensure safety with management of O2 line and IV pole. Mod I / Independent with functional mobility (with device, as needed/appropriate) in order to maximize independence with ADLs/IADLs and other functional tasks. Balance Sitting: Good  (at EOB)  Standing: Fair+  (without device)  Good dynamic standing balance during completion of ADLs/IADLs and other functional tasks. Activity Tolerance Fair  Patient will demonstrate Good understanding and consistent implementation of energy conservation techniques and work simplification techniques into ADL/IADL routines. Visual/  Perceptual WFL     N/A   B UE Strength 4-/5  Patient will demonstrate 4+/5 B UE strength in order to maximize independence with ADLs and functional transfers. Additional Long-Term Goal: Patient will increase functional independence to PLOF in order to allow patient to live in least restrictive environment. ROM: Additional Information:    R UE  AROM WFL    L UE AROM WFL      Hearing: Impaired  Sensation: No complaints of numbness/tingling in B UEs. Tone: WFL  Edema: No    Comments: RN approved patient's participation in [address] [place] [address] activities. Upon arrival, patient supine in bed. At end of session, patient seated in bedside chair with call light and phone within reach, lunch tray items within reach on tray table, and all lines and tubes intact. Patient would benefit from continued skilled OT to increase safety and independence with completion of ADL/IADL tasks for functional independence and quality of life. Rehab Potential: Good for established goals. Patient / Family Goal: Patient indicated that she anticipates returning home upon discharge.   Patient and/or family were instructed on functional diagnosis, prognosis/goals, and OT plan of care. Demonstrated Good understanding. Eval Complexity: Low    Time In: 1113  Time Out: 1129  Total Treatment Time: 0 minutes      Minutes Units   OT Eval Low 91472 16 1   OT Eval Medium 33179     OT Eval High 08242     OT Re-Eval [id number]     Therapeutic Ex 02519     Therapeutic Activities 58136     ADL/Self Care 11240     Orthotic Management 22012     Neuro Re-Ed 42348     Non-Billable Time N/A ---     Evaluation time includes thorough review of current medical information, gathering information on past medical history/social history and prior level of function, completion of standardized testing/informal observation of tasks, assessment of data, and education on plan of care and goals. Elina Avendaño Part, OTR/L  License Number: [id number]

## 2021-06-10 NOTE — CARE COORDINATION
On O2 2l NC- does not wear home O2 - O2 sat 100%- will need to wean off at discharge. Continues on Bumex drip and Cardizem drip. CO2 increased to 43 today. Free 30 day Eliquis care given 6/8. EF 28%. Plan remains to return home w/ daughter on discharge. Independent PTA.  Will follow Gregorio Olmos RN case manager

## 2021-06-10 NOTE — PROGRESS NOTES
noted.   Increased E point septal separation noted,suggesting decreased LV cardiac   output. Moderate to severe mitral regurgitation is present. PISA radius >1.0-severe   Mild tricuspid regurgitation. RVSP is 29 mmHg. Physiologic and/or trace pulmonic regurgitation present. Technically good quality study. No comparison study available. Suggest clinical correlation. Cardiology consult from yesterday appreciated; amiodarone has been started; patient has finally agreed to taking apixaban by mouth.  note from yesterday; patient was given a coupon for 30-day supply of apixaban. Physical therapy and PAC score from today 20/24. Urine culture as follows--      Component Collected Lab   Organism Abnormal  06/07/2021  9:41 PM [facility]   Escherichia coli    Urine Culture, Routine 06/07/2021  9:41 PM  - [facility]   >100,000 CFU/ml   Sensitivity to follow        6/10/2021-patient sitting quietly in bedside chair; nasal cannula oxygen in place. No chest pain; still with mild dyspnea. Appetite fair. Still has significant peripheral edema, not much better since admission. 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 Afebrile last 24 hours. Pulse 100 blood pressure 108/71.  2 L nasal cannula 100% SPO2. Intake and output -1754 cc. Chloride 92 CO2 increased to 43 calcium slightly low 8.4. Alkaline phosphatase 176 ALT 33. CRP 2.0. Hemoglobin 10.6 WBC 8.2. ABGs from yesterday, done on 3 L nasal cannula PCO2 67.4 PO2 of 127.8 pH 7.366. Physical therapy and PAC score from yesterday 20/24.   Urine culture sensitivities not available as follows--    Narrative  Performed by: [address] [place] [address] Lab  Source: URINE       Site: Urine&Urine (20)             Susceptibility    Escherichia coli (1)    Antibiotic Interpretation CHARMAINE Status    ampicillin Sensitive <=^2 mcg/mL     ceFAZolin Sensitive <=^4 mcg/mL     cefepime Sensitive <=^0.12 mcg/mL     cefTRIAXone Sensitive <=^0.25 mcg/mL     Confirmatory Extended Spectrum Beta-Lactamase Negative Neg mcg/mL     ertapenem Sensitive <=^0.12 mcg/mL     gentamicin Sensitive <=^1 mcg/mL     levofloxacin Sensitive <=^0.12 mcg/mL     nitrofurantoin Sensitive <=^16 mcg/mL     piperacillin-tazobactam Sensitive <=^4 mcg/mL     trimethoprim-sulfamethoxazole Sensitive <=^20 mcg/mL           Objective:     PHYSICAL EXAM:    VS: /71   Pulse 100   Temp 98.6 °F (37 °C) (Oral)   Resp 16   Ht 5' 6\" (1.676 m)   Wt 84 lb 3.2 oz (38.2 kg)   SpO2 100%   BMI 13.59 kg/m²     Labs:   CBC:   Lab Results   Component Value Date    WBC 8.2 06/10/2021    RBC 3.64 06/10/2021    HGB 10.6 06/10/2021    HCT 36.5 06/10/2021    .3 06/10/2021    MCH 29.1 06/10/2021    MCHC 29.0 06/10/2021    RDW 14.9 06/10/2021     06/10/2021    MPV 9.8 06/10/2021     CBC with Differential:    Lab Results   Component Value Date    WBC 8.2 06/10/2021    RBC 3.64 06/10/2021    HGB 10.6 06/10/2021    HCT 36.5 06/10/2021     06/10/2021    .3 06/10/2021    MCH 29.1 06/10/2021    MCHC 29.0 06/10/2021    RDW 14.9 06/10/2021    NRBC 0.0 06/10/2021    LYMPHOPCT 4.4 06/10/2021    MONOPCT 1.8 06/10/2021    BASOPCT 0.0 06/10/2021    MONOSABS 0.16 06/10/2021    LYMPHSABS 0.33 06/10/2021    EOSABS 0.14 06/10/2021    BASOSABS 0.00 06/10/2021     Hemoglobin/Hematocrit:    Lab Results   Component Value Date    HGB 10.6 06/10/2021    HCT 36.5 06/10/2021     CMP:    Lab Results   Component Value Date     06/10/2021    K 3.9 06/10/2021    K 5.2 06/07/2021    CL 92 06/10/2021    CO2 43 06/10/2021    BUN 22 06/10/2021    CREATININE 0.8 06/10/2021    GFRAA >60 06/10/2021    LABGLOM >60 06/10/2021    GLUCOSE 89 06/10/2021    PROT 7.5 06/10/2021    LABALBU 3.6 06/10/2021    CALCIUM 8.4 06/10/2021    BILITOT 0.3 06/10/2021    ALKPHOS 176 06/10/2021    AST 21 06/10/2021    ALT 33 06/10/2021     BMP:    Lab Results   Component Value Date     06/10/2021    K 3.9 06/10/2021    K 5.2 06/07/2021    CL 92 06/10/2021    CO2 43 06/10/2021    BUN 22 06/10/2021    LABALBU 3.6 06/10/2021    CREATININE 0.8 06/10/2021    CALCIUM 8.4 06/10/2021    GFRAA >60 06/10/2021    LABGLOM >60 06/10/2021    GLUCOSE 89 06/10/2021     Hepatic Function Panel:    Lab Results   Component Value Date    ALKPHOS 176 06/10/2021    ALT 33 06/10/2021    AST 21 06/10/2021    PROT 7.5 06/10/2021    BILITOT 0.3 06/10/2021    BILIDIR <0.2 06/10/2021    IBILI see below 06/10/2021    LABALBU 3.6 06/10/2021     Ionized Calcium:  No results found for: IONCA  Magnesium:    Lab Results   Component Value Date    MG 1.6 06/10/2021     Phosphorus:    Lab Results   Component Value Date    PHOS 2.7 06/10/2021     LDH:  No results found for: LDH  Uric Acid:    Lab Results   Component Value Date    LABURIC 4.5 06/08/2021     PT/INR:    Lab Results   Component Value Date    PROTIME 10.9 12/13/2016    INR 1.0 12/13/2016     Warfarin PT/INR:  No components found for: PTPATWAR, PTINRWAR  PTT:    Lab Results   Component Value Date    APTT 26.8 12/13/2016   [APTT}  Troponin:  No results found for: TROPONINI  Last 3 Troponin:  No results found for: TROPONINI  U/A:    Lab Results   Component Value Date    COLORU Yellow 11/23/2015    PROTEINU Negative 11/23/2015    PHUR 6.0 11/23/2015    WBCUA 5-10 11/23/2015    RBCUA 0-1 11/23/2015    BACTERIA MANY 11/23/2015    CLARITYU Clear 11/23/2015    SPECGRAV 1.025 11/23/2015    LEUKOCYTESUR Negative 11/23/2015    UROBILINOGEN 0.2 11/23/2015    BILIRUBINUR Negative 11/23/2015    BLOODU MODERATE 11/23/2015    GLUCOSEU Negative 11/23/2015     ABG:    Lab Results   Component Value Date    PH 7.366 06/09/2021    PCO2 67.4 06/09/2021    PO2 127.8 06/09/2021    HCO3 37.7 06/09/2021    BE 10.2 06/09/2021    O2SAT 98.7 06/09/2021     HgBA1c:    Lab Results   Component Value Date    LABA1C 5.6 06/08/2021     FLP:    Lab Results   Component Value Date    TRIG 74 06/08/2021    HDL 44 06/08/2021    LDLCALC 61 06/08/2021    LABVLDL 15 06/08/2021     TSH:    Lab Results   Component Value Date    TSH 2.400 06/08/2021     VITAMIN B12: No components found for: B12  FOLATE:    Lab Results   Component Value Date    FOLATE 20.0 06/08/2021     IRON:    Lab Results   Component Value Date    IRON 38 06/08/2021     Iron Saturation:  No components found for: PERCENTFE  TIBC:    Lab Results   Component Value Date    TIBC 251 06/08/2021     FERRITIN:    Lab Results   Component Value Date    FERRITIN 82 06/08/2021        General appearance: Alert, Awake, Oriented times 3, no distress; nasal cannula oxygen in place  Skin: Warm and dry ; no rashes  Head: Normocephalic. No masses, lesions or tenderness noted  Eyes: Conjunctivae pink, sclera white. PERRL,EOM-I  Ears: External ears normal; mildly hard of hearing  Nose/Sinuses: Nares normal. Septum midline. Mucosa normal. No drainage  Oropharynx: Oropharynx clear with no exudate seen  Neck: Supple. No jugular venous distension, lymphadenopathy or thyromegaly Trachea midline  Lungs: Rales at both bases  Heart: Irregularly irregular at 130/min  Abdomen: Soft, non-tender. BS normal. No masses, organomegaly; no rebound or guarding  Extremities: 1+ edema, Peripheral pulses palpable  Musculoskeletal: Muscular strength appears intact. Neuro:  No focal motor defects ; II-XII grossly intact .  KERN equally    TELEMETRY: REVIEWED--Telemetry: Atrial fibrillation and Rapid ventricular response    ASSESSMENT:   Principal Problem:    Atrial fibrillation with RVR (HCC)  Active Problems:    Pulmonary edema cardiac cause (HCC)    Hepatic congestion    Bilateral hearing loss    Moderate to severe mitral regurgitation    Systolic and diastolic CHF, acute on chronic (HCC)  Resolved Problems:    * No resolved hospital problems. *      PLAN:  SEE ORDERS      RE  CHANGES AND FINDINGS   Medications reviewed with patient  GI prophylaxis  DVT prophylaxis  Consultants notes reviewed    Amiodarone 200 mg by mouth daily  Diltiazem drip continues  Apixaban 5 mg twice daily  ABG in view of elevated CO2, venous  Discontinue bumetanide twice daily  Begin bumetanide drip 0.5 mg/h  Keflex 250 mg 3 times daily for UTI until sensitivities available  CT of chest  Pulmonary consult-continuous nasal cannula oxygen requirement, abnormal ABGs  Amiodarone 200 mg daily  Apixaban 5 mg twice a day  Cephalexin 250 mg 3 times daily, UTI  Bumex drip has been discontinued, Demadex 10 mg daily has been started  Entresto 24/26 0.5 tablets twice daily  Metoprolol succinate 12.5 mg daily  Monitor labs  Ultrasound gallbladder  Tumor markers          Discussed with patient and adult child and nursing. Gera Storm DO  DO     12:09 PM     6/10/2021    TIME > 25 MINUTES    >  50 %  OF  TIME  DISCUSSION               ------------  INFORMATION  -----------      DIET:ADULT DIET; Regular; No Added Salt (3-4 gm)      No Known Allergies      MEDICATION SIDE EFFECTS:none       SCHEDULED MEDS:                                 Scheduled Meds:   cephALEXin  250 mg Oral 3 times per day    amiodarone  200 mg Oral Daily    pantoprazole  40 mg Oral QAM AC    apixaban  5 mg Oral BID       Continuous Infusions:   bumetanide 0.1 mg/mL infusion 0.5 mg/hr (06/09/21 1427)    dilTIAZem 10 mg/hr (06/10/21 0151)         Data:       Intake/Output Summary (Last 24 hours) at 6/10/2021 1209  Last data filed at 6/10/2021 0945  Gross per 24 hour   Intake 420 ml   Output 200 ml   Net 220 ml       Wt Readings from Last 3 Encounters:   06/10/21 84 lb 3.2 oz (38.2 kg)   03/07/19 90 lb (40.8 kg)   12/13/16 93 lb (42.2 kg)       Labs: Additional    GLUCOSE:No results for input(s): POCGLU in the last 72 hours.     BNP:No results found for: BNP    CRP:   Recent Labs 06/08/21  0340 06/09/21  0345 06/10/21  0526   CRP 1.9* 1.6* 2.0*       ESR:  Recent Labs     06/08/21  0340 06/09/21  0345 06/10/21  0526   SEDRATE 19 15 31*       RADIOLOGY: REVIEWED AVAILABLE REPORT  US DUP LOWER EXTREMITIES BILATERAL VENOUS   Final Result   No evidence of DVT in either lower extremity. XR CHEST PORTABLE   Final Result   Hazy opacities are most suspicious for pneumonia. Pulmonary edema would be   an alternative consideration.                    [address],   DO   12:09 PM     6/10/2021      Voice recognition software used for dictation

## 2021-06-10 NOTE — PROGRESS NOTES
PROGRESS NOTE       PATIENT PROBLEM LIST:  Principal Problem:    Atrial fibrillation with RVR ([facility])  Active Problems:    Pulmonary edema cardiac cause ([facility])    Hepatic congestion    Bilateral hearing loss    Moderate to severe mitral regurgitation    Systolic and diastolic CHF, acute on chronic (HCC)    COPD (chronic obstructive pulmonary disease) ([facility])  Resolved Problems:    * No resolved hospital problems. *      SUBJECTIVE:  Enma Palacios states she wishes to go home and does not understand her present clinical status. REVIEW OF SYSTEMS:  General ROS: negative for - fatigue, malaise,  weight gain or weight loss  Psychological ROS: part of the for - anxiety. Ophthalmic ROS: negative for - decreased vision or visual distortion. ENT ROS: negative  Allergy and Immunology ROS: negative  Hematological and Lymphatic ROS: negative  Endocrine: no heat or cold intolerance and no polyphagia, polydipsia, or polyuria  Respiratory ROS: positive for - shortness of breath  Cardiovascular ROS: positive for - dyspnea on exertion, irregular heartbeat, rapid heart rate and shortness of breath. Gastrointestinal ROS: no abdominal pain, change in bowel habits, or black or bloody stools  Genito-Urinary ROS: no nocturia, dysuria, trouble voiding, frequency or hematuria  Musculoskeletal ROS: negative for- myalgias, arthralgias, or claudication  Neurological ROS: no TIA or stroke symptoms otherwise no significant change in symptoms or problems since yesterday as documented in previous progress notes.     SCHEDULED MEDICATIONS:   sacubitril-valsartan  0.5 tablet Oral BID    metoprolol succinate  12.5 mg Oral Daily    amiodarone  200 mg Oral BID    [START ON 6/11/2021] torsemide  20 mg Oral Daily    cephALEXin  250 mg Oral 3 times per day    pantoprazole  40 mg Oral QAM AC    apixaban  5 mg Oral BID       VITAL SIGNS: input(s): INR in the last 72 hours. PRO-BNP:   Lab Results   Component Value Date    PROBNP 1,410 (H) 06/08/2021    PROBNP 2,876 (H) 06/07/2021      TSH:   Lab Results   Component Value Date    TSH 2.400 06/08/2021      Cardiac Injury Profile: No results for input(s): CKTOTAL, CKMB, TROPONINI in the last 72 hours. Lipid Profile:   Lab Results   Component Value Date    TRIG 74 06/08/2021    HDL 44 06/08/2021    LDLCALC 61 06/08/2021    CHOL 120 06/08/2021      Hemoglobin A1C: No components found for: HGBA1C     RAD:   Echo Complete    Result Date: 6/10/2021  Transthoracic Echocardiography Report (TTE)  Demographics   Patient Name      Argenis Angelo Gender              Female                    S   Medical Record    [id number]         Room Number         0423  Number   Account #         [de-identified]        Procedure Date      06/08/2021   Corporate ID                       Ordering Physician  Leanne oMura   Accession Number  [id number]       Referring Physician Leanne Diaz   Date of Birth     1945       Sonographer         Frank Frazier                                                         IGLESIA   Age               68 year(s)       Interpreting        Janet Marshall DO                                     Physician                                      Any Other  Procedure Type of Study   TTE procedure:Echo Complete W/Doppler & Color Flow. Procedure Date Date: 06/08/2021 Start: 02:44 PM Study Location: Portable Technical Quality: Adequate visualization Indications:Congestive heart failure. Patient Status: Routine Height: 67 inches Weight: 83 pounds BSA: 1.39 m^2 BMI: 13 kg/m^2 HR: 85 bpm BP: 124/90 mmHg  Findings   Left Ventricle  Left ventricle grossly normal in size. Global hypokinesis is noted to be severe. Estimated left ventricular ejection fraction is 28±5%. Normal left ventricular wall thickness. Diastolic function cannot be accurately assessed due to significant mitral  regurgitation.    Right regurgitation. RVSP is 29 mmHg. Physiologic and/or trace pulmonic regurgitation present. Moderate-large left pleural effusion. Technically good quality study. No comparison study available. Suggest clinical correlation.    Signature   ----------------------------------------------------------------  Electronically signed by Alec Self DO(Interpreting  physician) on 06/10/2021 11:56 AM  ----------------------------------------------------------------  M-Mode/2D Measurements & Calculations   LV Diastolic    LV Systolic Dimension: 4 cm  AV Cusp Separation: 1.7 cmLA  Dimension: 4.4  LV Volume Diastolic: 68.9 ml Dimension: 3.4 cmAO Root  cm              LV Volume Systolic: 08.8 ml  Dimension: 2.9 cm  LV FS:9.1 %     LV EDV/LV EDV Index: 88.3  LV PW           ml/64 ml/m^2LV ESV/LV ESV  Diastolic: 0.8  Index: 26.0 ml/49ml/ m^2  cm              EF Calculated: 22.2 %        RV Diastolic Dimension: 2.3  LV PW Systolic: LV Mass Index: 85 l/min*m^2  cm  0.9 cm          LV Length: 7 cm  Septum                                       LA/Aorta: 7.60  Diastolic: 0.9  LVOT: 2 cm                   Ascending Aorta: 3 cm  cm                                           LA volume/Index: 73.9 ml  Septum                                       /43.3YF/S^1  Systolic: 0.9                                RA Area: 16.8 cm^2  cm  CO: 3.98 l/min                               IVC Expiration: 1.9 cm  CI: 2.86  l/m*m^2  LV Mass: 118.58  g  Doppler Measurements & Calculations   MV Peak E-Wave: 1.03 m/s   AV Peak Velocity:     LVOT Peak Velocity: 1.05                             1.45 m/s              m/s  MV Peak Gradient: 4.3 mmHg AV Peak Gradient:     LVOT Mean Velocity: 0.64  MV Mean Gradient: 2.8 mmHg 8.38 mmHg             m/s  MV Mean Velocity: 0.81 m/s AV Mean Velocity:     LVOT Peak Gradient: 4.4  MV P1/2t: 82.1 msec        1.01 m/s              mmHgLVOT Mean Gradient: 2  MVA by PHT:2.68 cm^2       AV Mean Gradient: 4.6 mmHg  MV Area and Doppler spectral analysis and color flow was obtained of the bilateral lower extremities. COMPARISON: None used. HISTORY: ORDERING SYSTEM PROVIDED HISTORY: Leg edema rule out DVT TECHNOLOGIST PROVIDED HISTORY: Reason for exam:->Leg edema rule out DVT What reading provider will be dictating this exam?->CRC FINDINGS: The visualized veins of the bilateral lower extremities are patent and free of echogenic thrombus. The veins demonstrate good compressibility with normal color flow study and spectral analysis. Bilateral soft tissue swelling. No evidence of DVT in either lower extremity. EKG: See Report  Echo: See Report      IMPRESSIONS:  Principal Problem:    Atrial fibrillation with RVR ([facility])  Active Problems:    Pulmonary edema cardiac cause (HCC)    Hepatic congestion    Bilateral hearing loss    Moderate to severe mitral regurgitation    Systolic and diastolic CHF, acute on chronic (HCC)    COPD (chronic obstructive pulmonary disease) ([facility])  Resolved Problems:    * No resolved hospital problems. *      RECOMMENDATIONS:  Mrs. Isaacs  has been placed on medications that hopefully will  help convert her to sinus rhythm and Within reassess her left ventricular function 3 months from the time of conversion. In the interim we will also obtain a nuclear stress test to make sure that her heart failure substrate is not ischemic coronary artery disease. I have spent more than 25 minutes face to face with Dori Felix and reviewing notes and laboratory data, with greater than 50% of this time instructing and counseling the patient and her daughter face to face regarding my findings and recommendations and I have answered all questions as posed to me by Ms. Buddy Bryant her daughter. Maddie Doyle, DO FACP,FACC,FSCAI      NOTE:  This report was transcribed using voice recognition software.   Every effort was made to ensure accuracy; however, inadvertent computerized transcription errors may be

## 2021-06-10 NOTE — PLAN OF CARE
Problem: Falls - Risk of:  Goal: Will remain free from falls  Description: Will remain free from falls  6/10/2021 1933 by Mima Cooper, RN  Outcome: Met This Shift

## 2021-06-10 NOTE — PROGRESS NOTES
PROGRESS NOTE       PATIENT PROBLEM LIST:  Principal Problem:    Atrial fibrillation with RVR ([facility])  Active Problems:    Pulmonary edema cardiac cause ([facility])    Hepatic congestion    Bilateral hearing loss    Moderate to severe mitral regurgitation    Systolic and diastolic CHF, acute on chronic ([facility])    COPD (chronic obstructive pulmonary disease) ([facility])  Resolved Problems:    * No resolved hospital problems. *      SUBJECTIVE:  Patience Rikki states She wants to know when she can be discharged. She still remains in atrial fibrillation with a moderate to rapid ventricular response and is short of breath with any effort. REVIEW OF SYSTEMS:  General ROS: positive for - fatigue  Psychological ROS: positive for - anxiety. Ophthalmic ROS: negative for - decreased vision or visual distortion. ENT ROS: negative  Allergy and Immunology ROS: negative  Hematological and Lymphatic ROS: negative  Endocrine: no heat or cold intolerance and no polyphagia, polydipsia, or polyuria  Respiratory ROS: positive for - shortness of breath  Cardiovascular ROS: positive for - dyspnea on exertion, edema, irregular heartbeat, rapid heart rate and shortness of breath. Gastrointestinal ROS: no abdominal pain, change in bowel habits, or black or bloody stools  Genito-Urinary ROS: no nocturia, dysuria, trouble voiding, frequency or hematuria  Musculoskeletal ROS: negative for- myalgias, arthralgias, or claudication  Neurological ROS: no TIA or stroke symptoms otherwise no significant change in symptoms or problems since yesterday as documented in previous progress notes.     SCHEDULED MEDICATIONS:   sacubitril-valsartan  0.5 tablet Oral BID    metoprolol succinate  12.5 mg Oral Daily    amiodarone  200 mg Oral BID    [START ON 6/11/2021] torsemide  20 mg Oral Daily    cephALEXin  250 mg Oral 3 times per day    pantoprazole  40 mg Oral QAM AC    apixaban  5 mg Oral BID       VITAL SIGNS: /65   Pulse 117   Temp 97.3 °F (36.3 °C) (Infrared)   Resp 16   Ht 5' 6\" (1.676 m)   Wt 84 lb 3.2 oz (38.2 kg)   SpO2 100%   BMI 13.59 kg/m²   Patient Vitals for the past 96 hrs (Last 3 readings):   Weight   06/10/21 0643 84 lb 3.2 oz (38.2 kg)   06/09/21 0625 90 lb 12.8 oz (41.2 kg)   06/08/21 0615 93 lb 12.8 oz (42.5 kg)     OBJECTIVE:    HEENT: PERRL, EOM  Intact; sclera non-icteric, conjunctiva pink. Carotids are brisk in upstroke with normal contour. No carotid bruits. Normal jugular venous pulsation at 45°. No palpable cervical nor supraclavicular nodes. Thyroid not palpable. Trachea midline. Chest: Even excursion  Lungs: Decreased breath sounds left base, no expiratory wheezes or rhonchi, no decreased tactile fremitus +Few endt inspiratory rales. Heart: Irregular, irregular  rhythm; S1 > S2, no gallop Grade 2/6 early systolic murmur second left intercostal space No clicks, rub, palpable thrills   or heaves. PMI nondisplaced, 5th intercostal space MCL. Abdomen: Soft, nontender, nondistended,  mildly protuberant, no masses or organomegaly. Bowel sounds active. Extremities: Without clubbing, cyanosis 2+ bilateral ankle and foot edema. Pulses present 3+ upper extermities bilaterally; barely palpable DP and barely palpable PT bilaterally. Data:   Scheduled Meds: Reviewed  Continuous Infusions:       CBC:     06/08/21  0340 06/09/21 0345   WBC 6.6 7.3   HGB 10.3* 10.1*   HCT 36.0 34.5    281     BMP:    06/08/21  0340 06/09/21 0345    145   K 5.4* 4.0    103   CO2 31* 37*   BUN 22 25*   CREATININE 0.8 0.8   LABGLOM >60 >60     ABGs:   Lab Results   Component Value Date    PH 7.366 06/09/2021    PO2 127.8 06/09/2021    PCO2 67.4 06/09/2021     INR: No results for input(s): INR in the last 72 hours.   PRO-BNP:   Lab Results   Component Value Date    PROBNP 1,410 (H) 06/08/2021    PROBNP 2,876 (H) 06/07/2021      TSH:   Lab Results   Component Value Date    TSH 2.400 06/08/2021      Cardiac Injury Profile: No results for input(s): CKTOTAL, CKMB, TROPONINI in the last 72 hours. Lipid Profile:   Lab Results   Component Value Date    TRIG 74 06/08/2021    HDL 44 06/08/2021    LDLCALC 61 06/08/2021    CHOL 120 06/08/2021      Hemoglobin A1C: No components found for: HGBA1C     RAD:   Echo Complete    Result Date: 6/10/2021  Transthoracic Echocardiography Report (TTE)  Demographics   Patient Name      Angela Seen Gender              Female                    S   Medical Record    [id number]         Room Number         0423  Number   Account #         [de-identified]        Procedure Date      06/08/2021   Corporate ID                       Ordering Physician  Lu Hidalgo   Accession Number  [id number]       Referring Physician Jen Liriano   Date of Birth     1945       Sonographer         Maida Has                                                         RDCS   Age               68 year(s)       Interpreting        Meli Minor DO                                     Physician                                      Any Other  Procedure Type of Study   TTE procedure:Echo Complete W/Doppler & Color Flow. Procedure Date Date: 06/08/2021 Start: 02:44 PM Study Location: Portable Technical Quality: Adequate visualization Indications:Congestive heart failure. Patient Status: Routine Height: 67 inches Weight: 83 pounds BSA: 1.39 m^2 BMI: 13 kg/m^2 HR: 85 bpm BP: 124/90 mmHg  Findings   Left Ventricle  Left ventricle grossly normal in size. Global hypokinesis is noted to be severe. Estimated left ventricular ejection fraction is 28±5%. Normal left ventricular wall thickness. Diastolic function cannot be accurately assessed due to significant mitral  regurgitation. Right Ventricle  Normal right ventricular size. Right ventricle global systolic function is mildly reduced .   TAPSE is not noted   Left Atrium The left atrium is moderately dilated. The LAESV Index is >34 ml/m2. Interatrial septum appears intact. Right Atrium  Moderately enlarged right atrium. Mitral Valve  Mild thickening of the mitral valve leaflets. Increased E point septal separation noted,suggesting decreased LV cardiac  output. Moderate to severe mitral regurgitation is present. PISA radius >1.0-severe   Tricuspid Valve  The tricuspid valve appears structurally normal.  Mild tricuspid regurgitation. RVSP is 29 mmHg. Aortic Valve  The aortic valve is trileaflet. Aortic valve opens well. The aortic valve appears mildly sclerotic. Trace-mild aortic regurgitation is noted. Pulmonic Valve  Pulmonic valve is structurally normal.  Physiologic and/or trace pulmonic regurgitation present. Pericardial Effusion  No evidence of pericardial thickening/calcification present. Pleural Effusion  Moderate-large left pleural effusion. Aorta  Aortic root dimension within normal limits. Ascending aorta appears mildly sclerotic and/or calcified. Miscellaneous  Normal Inferior Vena Cava diameter and respiratory variation. Conclusions   Summary  Left ventricle grossly normal in size. Global hypokinesis is noted to be severe. Estimated left ventricular ejection fraction is 28±5%. Normal left ventricular wall thickness. Diastolic function cannot be accurately assessed due to significant mitral  regurgitation. The left atrium is moderately dilated. The LAESV Index is >34 ml/m2. Normal right ventricular size. Right ventricle global systolic function is mildly reduced . TAPSE is not noted  Trace-mild aortic regurgitation is noted. Increased E point septal separation noted,suggesting decreased LV cardiac  output. Moderate to severe mitral regurgitation is present. PISA radius >1.0-severe  Mild tricuspid regurgitation. RVSP is 29 mmHg. Physiologic and/or trace pulmonic regurgitation present. Moderate-large left pleural effusion. Technically good quality study. No comparison study available. Suggest clinical correlation.    Signature   ----------------------------------------------------------------  Electronically signed by Tommie SPAINInterpreting  physician) on 06/10/2021 11:56 AM  ----------------------------------------------------------------  M-Mode/2D Measurements & Calculations   LV Diastolic    LV Systolic Dimension: 4 cm  AV Cusp Separation: 1.7 cmLA  Dimension: 4.4  LV Volume Diastolic: 61.4 ml Dimension: 3.4 cmAO Root  cm              LV Volume Systolic: 70.8 ml  Dimension: 2.9 cm  LV FS:9.1 %     LV EDV/LV EDV Index: 88.3  LV PW           ml/64 ml/m^2LV ESV/LV ESV  Diastolic: 0.8  Index: 86.1 ml/49ml/ m^2  cm              EF Calculated: 22.2 %        RV Diastolic Dimension: 2.3  LV PW Systolic: LV Mass Index: 85 l/min*m^2  cm  0.9 cm          LV Length: 7 cm  Septum                                       LA/Aorta: 8.02  Diastolic: 0.9  LVOT: 2 cm                   Ascending Aorta: 3 cm  cm                                           LA volume/Index: 73.9 ml  Septum                                       /37.1AY/C^7  Systolic: 0.9                                RA Area: 16.8 cm^2  cm  CO: 3.98 l/min                               IVC Expiration: 1.9 cm  CI: 2.86  l/m*m^2  LV Mass: 118.58  g  Doppler Measurements & Calculations   MV Peak E-Wave: 1.03 m/s   AV Peak Velocity:     LVOT Peak Velocity: 1.05                             1.45 m/s              m/s  MV Peak Gradient: 4.3 mmHg AV Peak Gradient:     LVOT Mean Velocity: 0.64  MV Mean Gradient: 2.8 mmHg 8.38 mmHg             m/s  MV Mean Velocity: 0.81 m/s AV Mean Velocity:     LVOT Peak Gradient: 4.4  MV P1/2t: 82.1 msec        1.01 m/s              mmHgLVOT Mean Gradient: 2  MVA by PHT:2.68 cm^2       AV Mean Gradient: 4.6 mmHg  MV Area (continuity): 2    mmHg  cm^2                       AV VTI: 24.9 cm                             AV Area (Continuity):1.88     TR Velocity:2.54 m/s  MR Velocity: 5.37 m/s      cm^2                  TR Gradient:25.79 mmHg  MV MONY PISA: 0.58 cm^2     AV Deceleration Time: PV Peak Velocity: 0.73  MR VTI: 147.9 cm           2244.5 msec           m/s  Alias Velocity: 0.41       LVOT VTI: 14.9 cm     PV Peak Gradient: 2.1  m/sPISA Radius: 1.1 cm     IVRT: 83 msec         mmHg                                                   PV Mean Velocity: 0.53  PISA area: 7.6 cm^2MR flow                       m/s  rate: 310.84 ml/sMR                              PV Mean Gradient: 1.3  volume:85.78 ml                                  mmHg  [email or web address] [id number]    XR CHEST PORTABLE    Result Date: 6/7/2021  EXAMINATION: ONE XRAY VIEW OF THE CHEST 6/7/2021 7:43 am COMPARISON: One-view chest x-ray 11/23/2015. CT chest 01/15/2009. HISTORY: ORDERING SYSTEM PROVIDED HISTORY: cough TECHNOLOGIST PROVIDED HISTORY: Reason for exam:->cough FINDINGS: There is mild-moderate enlargement of the cardiac silhouette, which may be exaggerated by AP technique. The mediastinal contours are within normal limits. The lungs appear hyperinflated. There are hazy opacities in the perihilar and basilar regions, left greater than right. Scarring is redemonstrated in the lung apices. No significant pleural effusions or pneumothorax. The bones appear osteopenic. EKG leads overlie the chest.     Hazy opacities are most suspicious for pneumonia. Pulmonary edema would be an alternative consideration. US DUP LOWER EXTREMITIES BILATERAL VENOUS    Result Date: 6/7/2021  EXAMINATION: DUPLEX VENOUS ULTRASOUND OF THE BILATERAL LOWER EXTREMITIES, 6/7/2021 7:06 pm TECHNIQUE: Duplex ultrasound using B-mode/gray scaled imaging and Doppler spectral analysis and color flow was obtained of the bilateral lower extremities. COMPARISON: None used.  HISTORY: ORDERING SYSTEM PROVIDED HISTORY: Leg edema rule out DVT TECHNOLOGIST PROVIDED HISTORY: Reason for exam:->Leg edema rule out DVT What reading provider will be dictating this exam?->CRC FINDINGS: The visualized veins of the bilateral lower extremities are patent and free of echogenic thrombus. The veins demonstrate good compressibility with normal color flow study and spectral analysis. Bilateral soft tissue swelling. No evidence of DVT in either lower extremity. EKG: See Report  Echo: See Report      IMPRESSIONS:  Principal Problem:    Atrial fibrillation with RVR (HCC)  Active Problems:    Pulmonary edema cardiac cause (HCC)    Hepatic congestion    Bilateral hearing loss    Moderate to severe mitral regurgitation    Systolic and diastolic CHF, acute on chronic (HCC)    COPD (chronic obstructive pulmonary disease) (HCC)  Resolved Problems:    * No resolved hospital problems. *      RECOMMENDATIONS:  At this time based upon Mrs. Rico's echocardiographic findings we will initiate appropriate heart failure medications in addition to amiodarone as her atria are enlarged enough that it may be difficult for her to convert back to sinus rhythm. We will also need to establish if the etiology of her significant left ventricular systolic dysfunction as a function of unimpeded tachyarrhythmia versus ischemic in origin. I have spent more than 28 minutes face to face with Dario Ends and reviewing notes and laboratory data, with greater than 50% of this time instructing and counseling the patient  face to face regarding my findings and recommendations and I have answered all questions as posed to me by Ms. Marley Leavitt. Jeremy Mullins, DO FACP,FACC,[facility]      NOTE:  This report was transcribed using voice recognition software.   Every effort was made to ensure accuracy; however, inadvertent computerized transcription errors may be present

## 2021-06-11 ENCOUNTER — APPOINTMENT (OUTPATIENT)
Dept: NON INVASIVE DIAGNOSTICS | Age: 76
DRG: 308 | End: 2021-06-11
Payer: MEDICARE

## 2021-06-11 ENCOUNTER — APPOINTMENT (OUTPATIENT)
Dept: NUCLEAR MEDICINE | Age: 76
DRG: 308 | End: 2021-06-11
Payer: MEDICARE

## 2021-06-11 PROBLEM — I27.20 PULMONARY HYPERTENSION (HCC): Status: ACTIVE | Noted: 2021-06-11

## 2021-06-11 LAB
ALBUMIN SERPL-MCNC: 3.7 G/DL (ref 3.5–5.2)
ALP BLD-CCNC: 170 U/L (ref 35–104)
ALT SERPL-CCNC: 28 U/L (ref 0–32)
ANION GAP SERPL CALCULATED.3IONS-SCNC: 6 MMOL/L (ref 7–16)
AST SERPL-CCNC: 20 U/L (ref 0–31)
BASOPHILS ABSOLUTE: 0.02 E9/L (ref 0–0.2)
BASOPHILS RELATIVE PERCENT: 0.2 % (ref 0–2)
BILIRUB SERPL-MCNC: 0.4 MG/DL (ref 0–1.2)
BILIRUBIN DIRECT: <0.2 MG/DL (ref 0–0.3)
BILIRUBIN, INDIRECT: ABNORMAL MG/DL (ref 0–1)
BUN BLDV-MCNC: 29 MG/DL (ref 6–23)
C-REACTIVE PROTEIN: 2.8 MG/DL (ref 0–0.4)
CA 125: 154.4 U/ML (ref 0–35)
CALCIUM IONIZED: 1.08 MMOL/L (ref 1.15–1.33)
CALCIUM SERPL-MCNC: 8.8 MG/DL (ref 8.6–10.2)
CEA: 2.4 NG/ML (ref 0–5.2)
CHLORIDE BLD-SCNC: 91 MMOL/L (ref 98–107)
CO2: 48 MMOL/L (ref 22–29)
CREAT SERPL-MCNC: 1 MG/DL (ref 0.5–1)
EOSINOPHILS ABSOLUTE: 0.03 E9/L (ref 0.05–0.5)
EOSINOPHILS RELATIVE PERCENT: 0.4 % (ref 0–6)
GFR AFRICAN AMERICAN: >60
GFR NON-AFRICAN AMERICAN: 54 ML/MIN/1.73
GLUCOSE BLD-MCNC: 200 MG/DL (ref 74–99)
HCT VFR BLD CALC: 39.4 % (ref 34–48)
HEMOGLOBIN: 11.3 G/DL (ref 11.5–15.5)
HYPOCHROMIA: ABNORMAL
IMMATURE GRANULOCYTES #: 0.03 E9/L
IMMATURE GRANULOCYTES %: 0.4 % (ref 0–5)
LYMPHOCYTES ABSOLUTE: 0.46 E9/L (ref 1.5–4)
LYMPHOCYTES RELATIVE PERCENT: 5.7 % (ref 20–42)
MAGNESIUM: 1.7 MG/DL (ref 1.6–2.6)
MCH RBC QN AUTO: 29.3 PG (ref 26–35)
MCHC RBC AUTO-ENTMCNC: 28.7 % (ref 32–34.5)
MCV RBC AUTO: 102.1 FL (ref 80–99.9)
MONOCYTES ABSOLUTE: 0.57 E9/L (ref 0.1–0.95)
MONOCYTES RELATIVE PERCENT: 7.1 % (ref 2–12)
NEUTROPHILS ABSOLUTE: 6.97 E9/L (ref 1.8–7.3)
NEUTROPHILS RELATIVE PERCENT: 86.2 % (ref 43–80)
PDW BLD-RTO: 14.6 FL (ref 11.5–15)
PHOSPHORUS: 2.7 MG/DL (ref 2.5–4.5)
PLATELET # BLD: 330 E9/L (ref 130–450)
PMV BLD AUTO: 9.3 FL (ref 7–12)
POTASSIUM SERPL-SCNC: 3.5 MMOL/L (ref 3.5–5)
PRO-BNP: 1733 PG/ML (ref 0–450)
RBC # BLD: 3.86 E12/L (ref 3.5–5.5)
SEDIMENTATION RATE, ERYTHROCYTE: 28 MM/HR (ref 0–20)
SODIUM BLD-SCNC: 145 MMOL/L (ref 132–146)
TOTAL PROTEIN: 7.6 G/DL (ref 6.4–8.3)
WBC # BLD: 8.1 E9/L (ref 4.5–11.5)

## 2021-06-11 PROCEDURE — 82104 ALPHA-1-ANTITRYPSIN PHENO: CPT

## 2021-06-11 PROCEDURE — 93017 CV STRESS TEST TRACING ONLY: CPT

## 2021-06-11 PROCEDURE — 82378 CARCINOEMBRYONIC ANTIGEN: CPT

## 2021-06-11 PROCEDURE — 94726 PLETHYSMOGRAPHY LUNG VOLUMES: CPT

## 2021-06-11 PROCEDURE — 2700000000 HC OXYGEN THERAPY PER DAY

## 2021-06-11 PROCEDURE — 94729 DIFFUSING CAPACITY: CPT

## 2021-06-11 PROCEDURE — 97530 THERAPEUTIC ACTIVITIES: CPT

## 2021-06-11 PROCEDURE — 83735 ASSAY OF MAGNESIUM: CPT

## 2021-06-11 PROCEDURE — 6370000000 HC RX 637 (ALT 250 FOR IP): Performed by: INTERNAL MEDICINE

## 2021-06-11 PROCEDURE — 82103 ALPHA-1-ANTITRYPSIN TOTAL: CPT

## 2021-06-11 PROCEDURE — 83880 ASSAY OF NATRIURETIC PEPTIDE: CPT

## 2021-06-11 PROCEDURE — 94060 EVALUATION OF WHEEZING: CPT

## 2021-06-11 PROCEDURE — 3430000000 HC RX DIAGNOSTIC RADIOPHARMACEUTICAL: Performed by: RADIOLOGY

## 2021-06-11 PROCEDURE — 6360000002 HC RX W HCPCS: Performed by: INTERNAL MEDICINE

## 2021-06-11 PROCEDURE — 82105 ALPHA-FETOPROTEIN SERUM: CPT

## 2021-06-11 PROCEDURE — 80048 BASIC METABOLIC PNL TOTAL CA: CPT

## 2021-06-11 PROCEDURE — 85651 RBC SED RATE NONAUTOMATED: CPT

## 2021-06-11 PROCEDURE — 78452 HT MUSCLE IMAGE SPECT MULT: CPT

## 2021-06-11 PROCEDURE — 86140 C-REACTIVE PROTEIN: CPT

## 2021-06-11 PROCEDURE — 2060000000 HC ICU INTERMEDIATE R&B

## 2021-06-11 PROCEDURE — 85025 COMPLETE CBC W/AUTO DIFF WBC: CPT

## 2021-06-11 PROCEDURE — 80076 HEPATIC FUNCTION PANEL: CPT

## 2021-06-11 PROCEDURE — 86301 IMMUNOASSAY TUMOR CA 19-9: CPT

## 2021-06-11 PROCEDURE — 82330 ASSAY OF CALCIUM: CPT

## 2021-06-11 PROCEDURE — 86304 IMMUNOASSAY TUMOR CA 125: CPT

## 2021-06-11 PROCEDURE — 84100 ASSAY OF PHOSPHORUS: CPT

## 2021-06-11 PROCEDURE — 36415 COLL VENOUS BLD VENIPUNCTURE: CPT

## 2021-06-11 PROCEDURE — A9500 TC99M SESTAMIBI: HCPCS | Performed by: RADIOLOGY

## 2021-06-11 RX ADMIN — AMIODARONE HYDROCHLORIDE 200 MG: 200 TABLET ORAL at 08:33

## 2021-06-11 RX ADMIN — APIXABAN 5 MG: 5 TABLET, FILM COATED ORAL at 08:33

## 2021-06-11 RX ADMIN — Medication 30 MILLICURIE: at 09:09

## 2021-06-11 RX ADMIN — CEPHALEXIN 250 MG: 250 CAPSULE ORAL at 22:07

## 2021-06-11 RX ADMIN — REGADENOSON 0.4 MG: 0.08 INJECTION, SOLUTION INTRAVENOUS at 10:35

## 2021-06-11 RX ADMIN — SACUBITRIL AND VALSARTAN 0.5 TABLET: 24; 26 TABLET, FILM COATED ORAL at 08:33

## 2021-06-11 RX ADMIN — AMIODARONE HYDROCHLORIDE 200 MG: 200 TABLET ORAL at 22:07

## 2021-06-11 RX ADMIN — TORSEMIDE 20 MG: 20 TABLET ORAL at 08:33

## 2021-06-11 RX ADMIN — CEPHALEXIN 250 MG: 250 CAPSULE ORAL at 14:52

## 2021-06-11 RX ADMIN — APIXABAN 5 MG: 5 TABLET, FILM COATED ORAL at 22:07

## 2021-06-11 RX ADMIN — Medication 10 MILLICURIE: at 09:08

## 2021-06-11 RX ADMIN — METOPROLOL SUCCINATE 12.5 MG: 25 TABLET, EXTENDED RELEASE ORAL at 08:33

## 2021-06-11 ASSESSMENT — PAIN SCALES - GENERAL
PAINLEVEL_OUTOF10: 0

## 2021-06-11 NOTE — PLAN OF CARE
Problem: Falls - Risk of:  Goal: Will remain free from falls  Description: Will remain free from falls  6/11/2021 0218 by Nanette Leyden, RN  Outcome: Met This Shift  6/10/2021 1933 by Mindy Arguello RN  Outcome: Met This Shift  Goal: Absence of physical injury  Description: Absence of physical injury  Outcome: Met This Shift

## 2021-06-11 NOTE — CARE COORDINATION
For stress today. Requiring O2 3lNC- does not wear home O2-[employer] DME following-will need O2 testing/order on discharge if unable to wean off. Free 30 day [employer] card/ info given to patient. Plan remains to return home w/ daughter on discharge- declining Rebecadirk Oseguera.  Tremaine Walsh, RN case manager

## 2021-06-11 NOTE — PROGRESS NOTES
Pharm stress test completed with physician, RNs, and nuclear medicine staff wearing procedure masks.

## 2021-06-11 NOTE — PROGRESS NOTES
PROGRESS  NOTE --                                                          INTERNAL  MEDICINE                                                                              I  PERSONALLY SAW , EXAMINED, AND CARED [facility], 6/11/2021     LABS, XRAY ,CHART, AND MEDICATIONS  REVIEWED BY ME       Chief complaint: Short of breath, leg edema      6/9/2021-SUBJECTIVE: Rylee Stack is alert awake and cooperative; oriented ×3. Denies any chest pain dyspnea nausea emesis. Tolerating diet. No abdominal pain. Sitting in bedside chair; daughter present through the exam.  Patient feels well hoping for discharge. Daughter states patient has had episodes of shortness of breath and fatigue off and on for last month or 2, no chest pain. Over nothing as bad as symptoms at the time of this admission. Afebrile last 24 hours. Most recent pulse 138 blood pressure 110/65.  96% saturation 4 L nasal cannula. Patient dropped to SPO2 of 80 on room air earlier this morning. Intake and output -2144 cc. CO2 37 BUN 25 creatinine 0.8. Glucose 100 CRP 1.6 albumin 3.4 alkaline phosphatase 180 ALT 38. WBC 7.7 hemoglobin 10.1 platelets 757. A1c 5.6 TSH 2.400. Sed rate 15. Viral respiratory panel, SARS-CoV-2 all not detected. Doppler/ultrasound both lower extremities negative for DVT. 2D echo completed with following results--        Summary   Left ventricle grossly normal in size. Global hypokinesis is noted to be severe. Estimated left ventricular ejection fraction is 28±5%. Normal left ventricular wall thickness. Diastolic function cannot be accurately assessed due to significant mitral   regurgitation. The left atrium is moderately dilated. The LAESV Index is >34 ml/m2. Normal right ventricular size. Right ventricle global systolic function is mildly reduced .    TAPSE is not noted   Trace-mild aortic regurgitation is noted.   Increased E point septal separation noted,suggesting decreased LV cardiac   output. Moderate to severe mitral regurgitation is present. PISA radius >1.0-severe   Mild tricuspid regurgitation. RVSP is 29 mmHg. Physiologic and/or trace pulmonic regurgitation present. Technically good quality study. No comparison study available. Suggest clinical correlation. Cardiology consult from yesterday appreciated; amiodarone has been started; patient has finally agreed to taking apixaban by mouth.  note from yesterday; patient was given a coupon for 30-day supply of apixaban. Physical therapy and PAC score from today 20/24. Urine culture as follows--      Component Collected Lab   Organism Abnormal  06/07/2021  9:41 PM [facility]   Escherichia coli    Urine Culture, Routine 06/07/2021  9:41 PM [facility]   >100,000 CFU/ml   Sensitivity to follow        6/10/2021-patient sitting quietly in bedside chair; nasal cannula oxygen in place. No chest pain; still with mild dyspnea. Appetite fair. Still has significant peripheral edema, not much better since admission. 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 Afebrile last 24 hours. Pulse 100 blood pressure 108/71.  2 L nasal cannula 100% SPO2. Intake and output -1754 cc. Chloride 92 CO2 increased to 43 calcium slightly low 8.4. Alkaline phosphatase 176 ALT 33. CRP 2.0. Hemoglobin 10.6 WBC 8.2. ABGs from yesterday, done on 3 L nasal cannula PCO2 67.4 PO2 of 127.8 pH 7.366. Physical therapy and PAC score from yesterday 20/24.   Urine culture sensitivities not available as follows--    Narrative  Performed by: [address] [place] [address] Lab  Source: URINE       Site: Urine&Urine (20)             Susceptibility    Escherichia bony abnormality.  No axillary or   supraclavicular lymphadenopathy.       Impression:       1. Findings most suggestive of congestive heart failure with mild pulmonary   edema, small bilateral pleural effusions, and small pericardial effusion in   the setting of cardiomegaly. 2. Upper lobe predominant peripheral scarring with small calcification. Scattered pulmonary nodules measure up to an average of 6 mm in diameter. Please see follow-up recommendations below. 3. Dilated main pulmonary artery which can be seen in the setting of   pulmonary hypertension. 4. Coronary artery disease and aortic valvular calcification. RECOMMENDATIONS:   Multiple pulmonary nodules. Most severe: 6 mm solid pulmonary nodule. Recommend a non-contrast Chest CT at 3-6 months, then another non-contrast   Chest CT at 18-24 months. These guidelines do not apply to immunocompromised patients and patients with   cancer. Follow up in patients with significant comorbidities as clinically   warranted. For lung cancer screening, adhere to Lung-RADS guidelines. Reference: Radiology. 2017; 284(1):228-43. Pulmonary consult from yesterday appreciated. Previous CT of chest revealed emphysematous changes bilaterally. Hypercapnia due to metabolic alkalosis and respiratory compensation and primary respiratory acidosis with a normal pH. Cardiology note from yesterday; patient still does not understand her heart issues. Hopefully will convert to sinus rhythm; reassess left ventricular function in 3 months from time of conversion. Obtain nuclear stress test to rule out ischemic coronary disease.  note from today, patient does not use oxygen at home, [address] DME following. Declines home health care.       Objective:     PHYSICAL EXAM:    VS: /76   Pulse 132   Temp 98.5 °F (36.9 °C)   Resp 18   Ht 5' 6\" (1.676 m)   Wt 80 lb 8 oz (36.5 kg)   SpO2 (!) 79%   BMI 12.99 kg/m²     Labs:   CBC:   Lab Results Lab Results   Component Value Date    MG 1.6 06/10/2021     Phosphorus:    Lab Results   Component Value Date    PHOS 2.7 06/10/2021     LDH:  No results found for: LDH  Uric Acid:    Lab Results   Component Value Date    LABURIC 4.5 06/08/2021     PT/INR:    Lab Results   Component Value Date    PROTIME 10.9 12/13/2016    INR 1.0 12/13/2016     Warfarin PT/INR:  No components found for: Bailey Hardwick  PTT:    Lab Results   Component Value Date    APTT 26.8 12/13/2016   [APTT}  Troponin:  No results found for: TROPONINI  Last 3 Troponin:  No results found for: TROPONINI  U/A:    Lab Results   Component Value Date    COLORU Yellow 11/23/2015    PROTEINU Negative 11/23/2015    PHUR 6.0 11/23/2015    WBCUA 5-10 11/23/2015    RBCUA 0-1 11/23/2015    BACTERIA MANY 11/23/2015    CLARITYU Clear 11/23/2015    SPECGRAV 1.025 11/23/2015    LEUKOCYTESUR Negative 11/23/2015    UROBILINOGEN 0.2 11/23/2015    BILIRUBINUR Negative 11/23/2015    BLOODU MODERATE 11/23/2015    GLUCOSEU Negative 11/23/2015     ABG:    Lab Results   Component Value Date    PH 7.366 06/09/2021    PCO2 67.4 06/09/2021    PO2 127.8 06/09/2021    HCO3 37.7 06/09/2021    BE 10.2 06/09/2021    O2SAT 98.7 06/09/2021     HgBA1c:    Lab Results   Component Value Date    LABA1C 5.6 06/08/2021     FLP:    Lab Results   Component Value Date    TRIG 74 06/08/2021    HDL 44 06/08/2021    LDLCALC 61 06/08/2021    LABVLDL 15 06/08/2021     TSH:    Lab Results   Component Value Date    TSH 2.400 06/08/2021     VITAMIN B12: No components found for: B12  FOLATE:    Lab Results   Component Value Date    FOLATE 20.0 06/08/2021     IRON:    Lab Results   Component Value Date    IRON 38 06/08/2021     Iron Saturation:  No components found for: PERCENTFE  TIBC:    Lab Results   Component Value Date    TIBC 251 06/08/2021     FERRITIN:    Lab Results   Component Value Date    FERRITIN 82 06/08/2021        General appearance: Alert, Awake, Oriented times 3, no distress; nasal cannula oxygen in place  Skin: Warm and dry ; no rashes  Head: Normocephalic. No masses, lesions or tenderness noted  Eyes: Conjunctivae pink, sclera white. PERRL,EOM-I  Ears: External ears normal; mildly hard of hearing  Nose/Sinuses: Nares normal. Septum midline. Mucosa normal. No drainage  Oropharynx: Oropharynx clear with no exudate seen  Neck: Supple. No jugular venous distension, lymphadenopathy or thyromegaly Trachea midline  Lungs: Rales at both bases  Heart: Irregularly irregular at 130/min  Abdomen: Soft, non-tender. BS normal. No masses, organomegaly; no rebound or guarding  Extremities: 1+ edema, Peripheral pulses palpable  Musculoskeletal: Muscular strength appears intact. Neuro:  No focal motor defects ; II-XII grossly intact . KERN equally    TELEMETRY: REVIEWED--Telemetry: Atrial fibrillation and Rapid ventricular response    ASSESSMENT:   Principal Problem:    Atrial fibrillation with RVR ([facility])  Active Problems:    Pulmonary edema cardiac cause ([facility])    Hepatic congestion    Bilateral hearing loss    Moderate to severe mitral regurgitation    Systolic and diastolic CHF, acute on chronic ([facility])    COPD (chronic obstructive pulmonary disease) ([facility])  Resolved Problems:    * No resolved hospital problems.  *      PLAN:  SEE ORDERS      RE  CHANGES AND FINDINGS   Medications reviewed with patient  GI prophylaxis  DVT prophylaxis  Consultants notes reviewed    Amiodarone 200 mg by mouth daily  Diltiazem drip continues  Apixaban 5 mg twice daily  ABG in view of elevated CO2, venous  Discontinue bumetanide twice daily  Begin bumetanide drip 0.5 mg/h  Keflex 250 mg 3 times daily for UTI until sensitivities available  CT of chest  Pulmonary consult-continuous nasal cannula oxygen requirement, abnormal ABGs  Amiodarone 200 mg daily  Apixaban 5 mg twice a day  Cephalexin 250 mg 3 times daily, UTI  Bumex drip has been discontinued, Demadex 10 mg daily has been started  Entresto 24/26 0.5 tablets twice daily  Metoprolol succinate 12.5 mg daily  Monitor labs  Tumor markers  Amiodarone 200 mg twice daily  Apixaban 5 mg daily  Cephalexin 250 mg 3 times daily  Diltiazem drip has been discontinued  Toprol-XL 12.5 mg daily  Torsemide increased to 20 mg daily  Entresto 24/25, 0.5 tablets twice daily  Cardiac stress test done today results pending  Pulmonary function test will need done inpatient versus outpatient  Await tumor markers      Discussed with patient and adult child and nursing. Jason Storm DO DO     11:48 AM     6/11/2021    TIME > 25 MINUTES    >  50 %  OF  TIME  DISCUSSION               ------------  INFORMATION  -----------      DIET:Diet NPO      No Known Allergies      MEDICATION SIDE EFFECTS:none       SCHEDULED MEDS:                                 Scheduled Meds:   sacubitril-valsartan  0.5 tablet Oral BID    metoprolol succinate  12.5 mg Oral Daily    amiodarone  200 mg Oral BID    torsemide  20 mg Oral Daily    cephALEXin  250 mg Oral 3 times per day    pantoprazole  40 mg Oral QAM AC    apixaban  5 mg Oral BID       Continuous Infusions:        Data:       Intake/Output Summary (Last 24 hours) at 6/11/2021 1148  Last data filed at 6/11/2021 0527  Gross per 24 hour   Intake 0 ml   Output 750 ml   Net -750 ml       Wt Readings from Last 3 Encounters:   06/11/21 80 lb 8 oz (36.5 kg)   03/07/19 90 lb (40.8 kg)   12/13/16 93 lb (42.2 kg)       Labs: Additional    GLUCOSE:No results for input(s): POCGLU in the last 72 hours. BNP:No results found for: BNP    CRP:   Recent Labs     06/09/21  0345 06/10/21  0526   CRP 1.6* 2.0*       ESR:  Recent Labs     06/09/21  0345 06/10/21  0526   SEDRATE 15 31*       RADIOLOGY: REVIEWED AVAILABLE REPORT  CT CHEST WO CONTRAST   Preliminary Result   1. Findings most suggestive of congestive heart failure with mild pulmonary   edema, small bilateral pleural effusions, and small pericardial effusion in   the setting of cardiomegaly.    2. Upper lobe predominant peripheral scarring with small calcification. Scattered pulmonary nodules measure up to an average of 6 mm in diameter. Please see follow-up recommendations below. 3. Dilated main pulmonary artery which can be seen in the setting of   pulmonary hypertension. 4. Coronary artery disease and aortic valvular calcification. RECOMMENDATIONS:   Multiple pulmonary nodules. Most severe: 6 mm solid pulmonary nodule. Recommend a non-contrast Chest CT at 3-6 months, then another non-contrast   Chest CT at 18-24 months. These guidelines do not apply to immunocompromised patients and patients with   cancer. Follow up in patients with significant comorbidities as clinically   warranted. For lung cancer screening, adhere to Lung-RADS guidelines. Reference: Radiology. 2017; 284(1):228-43. US DUP LOWER EXTREMITIES BILATERAL VENOUS   Final Result   No evidence of DVT in either lower extremity. XR CHEST PORTABLE   Final Result   Hazy opacities are most suspicious for pneumonia. Pulmonary edema would be   an alternative consideration.          NM Cardiac Stress Test Nuclear Imaging    (Results Pending)             [address],   DO   11:48 AM     6/11/2021      Voice recognition software used for dictation

## 2021-06-11 NOTE — PROGRESS NOTES
Physical Therapy    Facility/Department: [address] INTERNAL MEDICINE 2  Treatment note    NAME: Ragini Brown  : 1945  MRN: [id number]    Date of Service: 2021      Patient Diagnosis(es): The primary encounter diagnosis was Atrial fibrillation with RVR ([address] A diagnosis of Acute pulmonary edema (HCC) was also pertinent to this visit. has a past medical history of Atrial fibrillation with RVR ([address] Bilateral hearing loss, COPD (chronic obstructive pulmonary disease) (HCC), Hepatic congestion, Moderate to severe mitral regurgitation, Pulmonary edema cardiac cause ([address] Pulmonary hypertension ([address] and Systolic and diastolic CHF, acute on chronic ([address] has a past surgical history that includes fracture surgery (). Evaluating Therapist: Hillary Hernandez PT    Room #:  4805/8557-Y  Diagnosis:  Atrial fibrillation with RVR ([address] [I48.91]  Precautions:  Falls, O2      Social:  Pt lives with daughter  in an apartment with 18 steps to enter. Prior to admission independent all areas without device. Drives, works at [employer]. Does not use oxygen at home     Initial Evaluation  Date: 21 Treatment  21    Short Term/ Long Term   Goals   Was pt agreeable to Eval/treatment?  yes yes    Does pt have pain? no no    Bed Mobility  Rolling: independent  Supine to sit: independent  Sit to supine: independent  Scooting: independent NT independent   Transfers Sit to stand: supervision  Stand to sit: supervision  Stand pivot: supervision Sit <> stand Supervisoin independent   Ambulation    200 feet with no device with  feet x 1 CG/SBA for balance 400 feet with no device independent   Stair Negotiation  Ascended and descended  NT NT  18 steps with 1 rail independent   LE strength     4-/5    4/5   balance      Fair+     AM-PAC Raw score                       Pt is alert and able to follow instruction  Balance: fair/fair minus dynamic without A/D    Patient education  Pt was educated on slow steady gait    Patient response to education:   Pt verbalized understanding Pt demonstrated skill Pt requires further education in this area   yes With repeated instruction yes     ASSESSMENT:   Comments: Pt found sitting EOB, agreed to rx. Gait to the hallway, phil initially fast, noted lateral drifting, occasional hands on assist required for balance. Once phil decreased, balance improved. Treatment: Pt practiced and was instructed in the following treatment: gait quality    Pt was left sitting EOB as found with call light in reach    Time in 1429   Time out 1443   Total Treatment Time 14 minutes   CPT codes:     Therapeutic activities 02599 14 minutes   Therapeutic exercises 94585 0 minutes       Pt is making fair progress toward established Physical Therapy goals as per functional mobility performed. Continue with physical therapy current plan of care.     Dashawn Petty PTA   License Number: [id number]

## 2021-06-11 NOTE — PROGRESS NOTES
Pulmonary Progress Note    Admit Date: 2021  Hospital day                               PCP: Yumiko Lee MD    Chief Complaint (s):  Patient Active Problem List   Diagnosis    [facility]    Irregular cardiac rhythm    Atrial fibrillation with RVR ([address]    Pulmonary edema cardiac cause (HCC)    Hepatic congestion    Bilateral hearing loss    Moderate to severe mitral regurgitation    Systolic and diastolic CHF, acute on chronic ([address]    COPD (chronic obstructive pulmonary disease) ([facility]    Pulmonary hypertension (HCC)       Subjective:  · Seen in follow-up today. CT scan of the chest is reviewed, images are below. Pulmonary functions are not yet done. Stress test is reviewed. Overall, breathing is about the same. Vitals:  VITALS:  /76   Pulse 132   Temp 98.5 °F (36.9 °C)   Resp 18   Ht 5' 6\" (1.676 m)   Wt 80 lb 8 oz (36.5 kg)   SpO2 (!) 79%   BMI 12.99 kg/m²     24HR INTAKE/OUTPUT:      Intake/Output Summary (Last 24 hours) at 2021 1326  Last data filed at 2021 0527  Gross per 24 hour   Intake --   Output 750 ml   Net -750 ml       24HR PULSE OXIMETRY RANGE:    SpO2  Av %  Min: 79 %  Max: 100 %    Medications:  IV:      Scheduled Meds:   sacubitril-valsartan  0.5 tablet Oral BID    metoprolol succinate  12.5 mg Oral Daily    amiodarone  200 mg Oral BID    torsemide  20 mg Oral Daily    cephALEXin  250 mg Oral 3 times per day    pantoprazole  40 mg Oral QAM AC    apixaban  5 mg Oral BID       Diet:   ADULT DIET; Regular; No Added Salt (3-4 gm)     EXAM:  General: No distress. Alert. Eyes: PERRL. No sclera icterus. No conjunctival injection. ENT: No discharge. Pharynx clear. Neck: Trachea midline. Normal thyroid. Resp: No accessory muscle use. No rales. No wheezing. No rhonchi. CV: Regular rate. Regular rhythm. No murmur or rub. Abd: Non-tender. Non-distended. No masses. No organomegaly.  Normal bowel sounds. Skin: Warm and dry. No nodule on exposed extremities. No rash on exposed extremities. Ext: No cyanosis, clubbing, edema  Lymph: No cervical LAD. No supraclavicular LAD. M/S: No cyanosis. No joint deformity. No clubbing. Neuro: Awake. Follows commands. Positive pupils/gag/corneals. Normal pain response. Results:  CBC:   Recent Labs     06/09/21  0345 06/10/21  0526   WBC 7.3 8.2   HGB 10.1* 10.6*   HCT 34.5 36.5   .6* 100.3*    304     BMP:   Recent Labs     06/09/21  0345 06/10/21  0526    142   K 4.0 3.9    92*   CO2 37* 43*   PHOS 3.1 2.7   BUN 25* 22   CREATININE 0.8 0.8     LIVER PROFILE:   Recent Labs     06/09/21  0345 06/10/21  0526   AST 21 21   ALT 38* 33*   BILIDIR <0.2 <0.2   BILITOT <0.2 0.3   ALKPHOS 180* 176*     PT/INR: No results for input(s): PROTIME, INR in the last 72 hours. APTT: No results for input(s): APTT in the last 72 hours. Pathology:  1. N/A      Microbiology:  1. None    Recent ABG:   Recent Labs     06/09/21  1420   PH 7.366   PO2 127.8*   PCO2 67.4*   HCO3 37.7*   BE 10.2*   O2SAT 98.7*   METHB 0.3   O2HB 97.2*   COHB 1.2   O2CON 15.5   HHB 1.3   THB 11.2*             Recent Films:  CT CHEST WO CONTRAST   Preliminary Result   1. Findings most suggestive of congestive heart failure with mild pulmonary   edema, small bilateral pleural effusions, and small pericardial effusion in   the setting of cardiomegaly. 2. Upper lobe predominant peripheral scarring with small calcification. Scattered pulmonary nodules measure up to an average of 6 mm in diameter. Please see follow-up recommendations below. 3. Dilated main pulmonary artery which can be seen in the setting of   pulmonary hypertension. 4. Coronary artery disease and aortic valvular calcification. RECOMMENDATIONS:   Multiple pulmonary nodules. Most severe: 6 mm solid pulmonary nodule.    Recommend a non-contrast Chest CT at 3-6 months, then another non-contrast   Chest CT at 18-24 months. These guidelines do not apply to immunocompromised patients and patients with   cancer. Follow up in patients with significant comorbidities as clinically   warranted. For lung cancer screening, adhere to Lung-RADS guidelines. Reference: Radiology. 2017; 284(1):228-43. US DUP LOWER EXTREMITIES BILATERAL VENOUS   Final Result   No evidence of DVT in either lower extremity. XR CHEST PORTABLE   Final Result   Hazy opacities are most suspicious for pneumonia. Pulmonary edema would be   an alternative consideration. NM Cardiac Stress Test Nuclear Imaging    (Results Pending)       Assessment:  1. Background of mild interstitial lung disease with apical fibrosis, bronchiectasis and some COPD. Given the patient's occupational history: It may be secondary to years of smoking relation as a . The right upper lobe will need to be followed. Plan:  1. Await pulmonary function testing    Time at the bedside, reviewing labs and radiographs, reviewing updated notes and consultations, discussing with staff and family was more than 25 minutes. Please note that voice recognition technology was used in the preparation of this note and make therefore it may contain inadvertent transcription errors. If the patient is a COVID 19 isolation patient, the above physical exam reflects that of the examining physician for the day. Chelo Noe MD,  M.D., F.C.C.P.     [facility], [address] [place] [address], [address] [place] [address], [facility] [place][note 3]

## 2021-06-12 ENCOUNTER — APPOINTMENT (OUTPATIENT)
Dept: CT IMAGING | Age: 76
DRG: 308 | End: 2021-06-12
Payer: MEDICARE

## 2021-06-12 LAB
ALBUMIN SERPL-MCNC: 3.2 G/DL (ref 3.5–5.2)
ALP BLD-CCNC: 138 U/L (ref 35–104)
ALT SERPL-CCNC: 21 U/L (ref 0–32)
ANION GAP SERPL CALCULATED.3IONS-SCNC: 4 MMOL/L (ref 7–16)
AST SERPL-CCNC: 15 U/L (ref 0–31)
BASOPHILS ABSOLUTE: 0.03 E9/L (ref 0–0.2)
BASOPHILS RELATIVE PERCENT: 0.4 % (ref 0–2)
BILIRUB SERPL-MCNC: 0.4 MG/DL (ref 0–1.2)
BILIRUBIN DIRECT: <0.2 MG/DL (ref 0–0.3)
BILIRUBIN, INDIRECT: ABNORMAL MG/DL (ref 0–1)
BLOOD CULTURE, ROUTINE: NORMAL
BUN BLDV-MCNC: 28 MG/DL (ref 6–23)
C-REACTIVE PROTEIN: 2.7 MG/DL (ref 0–0.4)
CALCIUM SERPL-MCNC: 8.4 MG/DL (ref 8.6–10.2)
CHLORIDE BLD-SCNC: 94 MMOL/L (ref 98–107)
CO2: 47 MMOL/L (ref 22–29)
CREAT SERPL-MCNC: 0.8 MG/DL (ref 0.5–1)
EOSINOPHILS ABSOLUTE: 0.22 E9/L (ref 0.05–0.5)
EOSINOPHILS RELATIVE PERCENT: 2.9 % (ref 0–6)
GFR AFRICAN AMERICAN: >60
GFR NON-AFRICAN AMERICAN: >60 ML/MIN/1.73
GLUCOSE BLD-MCNC: 92 MG/DL (ref 74–99)
HCT VFR BLD CALC: 35.9 % (ref 34–48)
HEMOGLOBIN: 10.7 G/DL (ref 11.5–15.5)
HYPOCHROMIA: ABNORMAL
IMMATURE GRANULOCYTES #: 0.03 E9/L
IMMATURE GRANULOCYTES %: 0.4 % (ref 0–5)
LV EF: 52 %
LVEF MODALITY: NORMAL
LYMPHOCYTES ABSOLUTE: 0.51 E9/L (ref 1.5–4)
LYMPHOCYTES RELATIVE PERCENT: 6.7 % (ref 20–42)
MAGNESIUM: 1.8 MG/DL (ref 1.6–2.6)
MCH RBC QN AUTO: 30 PG (ref 26–35)
MCHC RBC AUTO-ENTMCNC: 29.8 % (ref 32–34.5)
MCV RBC AUTO: 100.6 FL (ref 80–99.9)
MONOCYTES ABSOLUTE: 0.8 E9/L (ref 0.1–0.95)
MONOCYTES RELATIVE PERCENT: 10.6 % (ref 2–12)
NEUTROPHILS ABSOLUTE: 5.98 E9/L (ref 1.8–7.3)
NEUTROPHILS RELATIVE PERCENT: 79 % (ref 43–80)
OVALOCYTES: ABNORMAL
PDW BLD-RTO: 14.6 FL (ref 11.5–15)
PHOSPHORUS: 3 MG/DL (ref 2.5–4.5)
PLATELET # BLD: 290 E9/L (ref 130–450)
PMV BLD AUTO: 9.3 FL (ref 7–12)
POIKILOCYTES: ABNORMAL
POLYCHROMASIA: ABNORMAL
POTASSIUM SERPL-SCNC: 3.4 MMOL/L (ref 3.5–5)
RBC # BLD: 3.57 E12/L (ref 3.5–5.5)
SEDIMENTATION RATE, ERYTHROCYTE: 18 MM/HR (ref 0–20)
SODIUM BLD-SCNC: 145 MMOL/L (ref 132–146)
TOTAL PROTEIN: 6.6 G/DL (ref 6.4–8.3)
WBC # BLD: 7.6 E9/L (ref 4.5–11.5)

## 2021-06-12 PROCEDURE — 85025 COMPLETE CBC W/AUTO DIFF WBC: CPT

## 2021-06-12 PROCEDURE — 80048 BASIC METABOLIC PNL TOTAL CA: CPT

## 2021-06-12 PROCEDURE — 2060000000 HC ICU INTERMEDIATE R&B

## 2021-06-12 PROCEDURE — 85651 RBC SED RATE NONAUTOMATED: CPT

## 2021-06-12 PROCEDURE — 86140 C-REACTIVE PROTEIN: CPT

## 2021-06-12 PROCEDURE — 6370000000 HC RX 637 (ALT 250 FOR IP): Performed by: INTERNAL MEDICINE

## 2021-06-12 PROCEDURE — 2700000000 HC OXYGEN THERAPY PER DAY

## 2021-06-12 PROCEDURE — 83735 ASSAY OF MAGNESIUM: CPT

## 2021-06-12 PROCEDURE — 36415 COLL VENOUS BLD VENIPUNCTURE: CPT

## 2021-06-12 PROCEDURE — 80076 HEPATIC FUNCTION PANEL: CPT

## 2021-06-12 PROCEDURE — 84100 ASSAY OF PHOSPHORUS: CPT

## 2021-06-12 RX ORDER — ACETAZOLAMIDE 250 MG/1
125 TABLET ORAL DAILY
Status: DISCONTINUED | OUTPATIENT
Start: 2021-06-12 | End: 2021-06-13

## 2021-06-12 RX ADMIN — POTASSIUM CHLORIDE 40 MEQ: 1500 TABLET, EXTENDED RELEASE ORAL at 08:59

## 2021-06-12 RX ADMIN — PANTOPRAZOLE SODIUM 40 MG: 40 TABLET, DELAYED RELEASE ORAL at 05:16

## 2021-06-12 RX ADMIN — CEPHALEXIN 250 MG: 250 CAPSULE ORAL at 22:18

## 2021-06-12 RX ADMIN — CEPHALEXIN 250 MG: 250 CAPSULE ORAL at 05:17

## 2021-06-12 RX ADMIN — CEPHALEXIN 250 MG: 250 CAPSULE ORAL at 14:35

## 2021-06-12 RX ADMIN — TORSEMIDE 20 MG: 20 TABLET ORAL at 08:59

## 2021-06-12 RX ADMIN — APIXABAN 5 MG: 5 TABLET, FILM COATED ORAL at 22:18

## 2021-06-12 RX ADMIN — ACETAZOLAMIDE 125 MG: 250 TABLET ORAL at 17:22

## 2021-06-12 RX ADMIN — APIXABAN 5 MG: 5 TABLET, FILM COATED ORAL at 08:59

## 2021-06-12 RX ADMIN — AMIODARONE HYDROCHLORIDE 200 MG: 200 TABLET ORAL at 22:18

## 2021-06-12 RX ADMIN — AMIODARONE HYDROCHLORIDE 200 MG: 200 TABLET ORAL at 08:59

## 2021-06-12 ASSESSMENT — PAIN SCALES - GENERAL
PAINLEVEL_OUTOF10: 0

## 2021-06-12 NOTE — PLAN OF CARE
Problem: Falls - Risk of:  Goal: Will remain free from falls  Description: Will remain free from falls  6/12/2021 0911 by Amber Guerra RN  Outcome: Met This Shift

## 2021-06-12 NOTE — PROGRESS NOTES
PROGRESS  NOTE --                                                          INTERNAL  MEDICINE                                                                              I  PERSONALLY SAW , EXAMINED, AND CARED [address] [facility], 6/12/2021     LABS, XRAY ,CHART, AND MEDICATIONS  REVIEWED BY ME       Chief complaint: Short of breath, leg edema      6/9/2021-SUBJECTIVE: Dinorah Blackwell is alert awake and cooperative; oriented ×3. Denies any chest pain dyspnea nausea emesis. Tolerating diet. No abdominal pain. Sitting in bedside chair; daughter present through the exam.  Patient feels well hoping for discharge. Daughter states patient has had episodes of shortness of breath and fatigue off and on for last month or 2, no chest pain. Over nothing as bad as symptoms at the time of this admission. Afebrile last 24 hours. Most recent pulse 138 blood pressure 110/65.  96% saturation 4 L nasal cannula. Patient dropped to SPO2 of 80 on room air earlier this morning. Intake and output -2144 cc. CO2 37 BUN 25 creatinine 0.8. Glucose 100 CRP 1.6 albumin 3.4 alkaline phosphatase 180 ALT 38. WBC 7.7 hemoglobin 10.1 platelets 258. A1c 5.6 TSH 2.400. Sed rate 15. Viral respiratory panel, SARS-CoV-2 all not detected. Doppler/ultrasound both lower extremities negative for DVT. 2D echo completed with following results--        Summary   Left ventricle grossly normal in size. Global hypokinesis is noted to be severe. Estimated left ventricular ejection fraction is 28±5%. Normal left ventricular wall thickness. Diastolic function cannot be accurately assessed due to significant mitral   regurgitation. The left atrium is moderately dilated. The LAESV Index is >34 ml/m2. Normal right ventricular size. Right ventricle global systolic function is mildly reduced .    TAPSE is not noted   Trace-mild aortic regurgitation is coli (1)    Antibiotic Interpretation CHARMAINE Status    ampicillin Sensitive <=^2 mcg/mL     ceFAZolin Sensitive <=^4 mcg/mL     cefepime Sensitive <=^0.12 mcg/mL     cefTRIAXone Sensitive <=^0.25 mcg/mL     Confirmatory Extended Spectrum Beta-Lactamase Negative Neg mcg/mL     ertapenem Sensitive <=^0.12 mcg/mL     gentamicin Sensitive <=^1 mcg/mL     levofloxacin Sensitive <=^0.12 mcg/mL     nitrofurantoin Sensitive <=^16 mcg/mL     piperacillin-tazobactam Sensitive <=^4 mcg/mL     trimethoprim-sulfamethoxazole Sensitive <=^20 mcg/mL         6/11/2021-sitting in bedside chair; nasal cannula oxygen in place. No dyspnea or chest pain at rest.  She just returned from cardiac stress test.  Since that she did work as a  for many years, she was exposed to significant secondhand smoke over those years. COPD discussed with her; also the need for pulmonary functions in patient or outpatient. Afebrile last 24 hours. Pulse rate still elevated 132. Blood pressure 105/76. SPO2 79 on room air. Prior to that 98% on 2 L nasal cannula. Intake and output -2264 cc. Weight has decreased from 93 pounds currently 80 pounds. Labs from today pending. FINDINGS:   Mediastinum: The heart is enlarged.  Mild diffuse ectasia of the ascending   aorta to 3.8 cm without evidence of focal aneurysm.  Small pericardial   effusion.  The main pulmonary artery is enlarged to 3.6 cm, which can be seen   in the setting of pulmonary hypertension.  Coronary artery calcifications and   aortic valvular calcifications are noted.  The central airways are patent. Lungs/pleura: Small bilateral pleural effusions.  Biapical calcification and   scarring.  Bilateral infrahilar ground-glass opacities are consistent with   the given history of pulmonary edema.  A few scattered pulmonary nodules   bilaterally measure up to 7 x 5 mm. Upper Abdomen: Limited images of the upper abdomen are without acute findings.      Soft Tissues/Bones: No acute bony abnormality.  No axillary or   supraclavicular lymphadenopathy.       Impression:       1. Findings most suggestive of congestive heart failure with mild pulmonary   edema, small bilateral pleural effusions, and small pericardial effusion in   the setting of cardiomegaly. 2. Upper lobe predominant peripheral scarring with small calcification. Scattered pulmonary nodules measure up to an average of 6 mm in diameter. Please see follow-up recommendations below. 3. Dilated main pulmonary artery which can be seen in the setting of   pulmonary hypertension. 4. Coronary artery disease and aortic valvular calcification. RECOMMENDATIONS:   Multiple pulmonary nodules. Most severe: 6 mm solid pulmonary nodule. Recommend a non-contrast Chest CT at 3-6 months, then another non-contrast   Chest CT at 18-24 months. These guidelines do not apply to immunocompromised patients and patients with   cancer. Follow up in patients with significant comorbidities as clinically   warranted. For lung cancer screening, adhere to Lung-RADS guidelines. Reference: Radiology. 2017; 284(1):228-43. Pulmonary consult from yesterday appreciated. Previous CT of chest revealed emphysematous changes bilaterally. Hypercapnia due to metabolic alkalosis and respiratory compensation and primary respiratory acidosis with a normal pH. Cardiology note from yesterday; patient still does not understand her heart issues. Hopefully will convert to sinus rhythm; reassess left ventricular function in 3 months from time of conversion. Obtain nuclear stress test to rule out ischemic coronary disease.  note from today, patient does not use oxygen at home, Rosa HARTMAN following. Declines home health care. 6/12/2021-patient sitting quietly in bed; no chest pain. She states she is not having any dyspnea up to the bathroom; however she still desaturates without oxygen.   History again reviewed; she is never had hysterectomy, oophorectomy or other. Has been \"many many years\" since last pelvic exam.  She apparently did have pulmonary functions done yesterday; await pulmonologist review. Results are in soft chart. Afebrile last 24 hours. Heart rate still rapid between 112 and 122/min. Blood pressure 92/64; 93% saturation on 2 L nasal cannula. Intake and output -2734 cc. Weight increased, currently 83 pounds have been 80 pounds yesterday. Potassium 3.4 chloride 94 CO2 still elevated 47. BUN 28 creatinine 0.8 calcium 8.4. Albumin 3.2 CRP decreasing 2.7.  proBNP still elevated 1733. Hemoglobin 10.7 WBC 7.6 platelets 967. Sed rate 18. C A-125 markedly elevated 154.4. CEA normal 2.4. Pulmonary note from yesterday appreciated. CT compatible with mild interstitial lung disease with apical fibrosis, bronchiectasis and some COPD. Given occupational history likely due to years of secondary smoke working as a . Right upper lobe will need to be followed. Await pulmonary functions. Physical therapy AMPAC score from yesterday 20/24. Cardiac stress test from yesterday, results still pending.       Objective:     PHYSICAL EXAM:    VS: BP 92/64   Pulse 112   Temp 97.8 °F (36.6 °C) (Oral)   Resp 18   Ht 5' 6\" (1.676 m)   Wt 83 lb (37.6 kg)   SpO2 93%   BMI 13.40 kg/m²     Labs:   CBC:   Lab Results   Component Value Date    WBC 7.6 06/12/2021    RBC 3.57 06/12/2021    HGB 10.7 06/12/2021    HCT 35.9 06/12/2021    .6 06/12/2021    MCH 30.0 06/12/2021    MCHC 29.8 06/12/2021    RDW 14.6 06/12/2021     06/12/2021    MPV 9.3 06/12/2021     CBC with Differential:    Lab Results   Component Value Date    WBC 7.6 06/12/2021    RBC 3.57 06/12/2021    HGB 10.7 06/12/2021    HCT 35.9 06/12/2021     06/12/2021    .6 06/12/2021    MCH 30.0 06/12/2021    MCHC 29.8 06/12/2021    RDW 14.6 06/12/2021    NRBC 0.0 06/10/2021    LYMPHOPCT 6.7 06/12/2021    MONOPCT 10.6 06/12/2021    BASOPCT 0.4 06/12/2021 MONOSABS 0.80 06/12/2021    LYMPHSABS 0.51 06/12/2021    EOSABS 0.22 06/12/2021    BASOSABS 0.03 06/12/2021     Hemoglobin/Hematocrit:    Lab Results   Component Value Date    HGB 10.7 06/12/2021    HCT 35.9 06/12/2021     CMP:    Lab Results   Component Value Date     06/12/2021    K 3.4 06/12/2021    K 5.2 06/07/2021    CL 94 06/12/2021    CO2 47 06/12/2021    BUN 28 06/12/2021    CREATININE 0.8 06/12/2021    GFRAA >60 06/12/2021    LABGLOM >60 06/12/2021    GLUCOSE 92 06/12/2021    PROT 6.6 06/12/2021    LABALBU 3.2 06/12/2021    CALCIUM 8.4 06/12/2021    BILITOT 0.4 06/12/2021    ALKPHOS 138 06/12/2021    AST 15 06/12/2021    ALT 21 06/12/2021     BMP:    Lab Results   Component Value Date     06/12/2021    K 3.4 06/12/2021    K 5.2 06/07/2021    CL 94 06/12/2021    CO2 47 06/12/2021    BUN 28 06/12/2021    LABALBU 3.2 06/12/2021    CREATININE 0.8 06/12/2021    CALCIUM 8.4 06/12/2021    GFRAA >60 06/12/2021    LABGLOM >60 06/12/2021    GLUCOSE 92 06/12/2021     Hepatic Function Panel:    Lab Results   Component Value Date    ALKPHOS 138 06/12/2021    ALT 21 06/12/2021    AST 15 06/12/2021    PROT 6.6 06/12/2021    BILITOT 0.4 06/12/2021    BILIDIR <0.2 06/12/2021    IBILI see below 06/12/2021    LABALBU 3.2 06/12/2021     Ionized Calcium:  No results found for: IONCA  Magnesium:    Lab Results   Component Value Date    MG 1.8 06/12/2021     Phosphorus:    Lab Results   Component Value Date    PHOS 3.0 06/12/2021     LDH:  No results found for: LDH  Uric Acid:    Lab Results   Component Value Date    LABURIC 4.5 06/08/2021     PT/INR:    Lab Results   Component Value Date    PROTIME 10.9 12/13/2016    INR 1.0 12/13/2016     Warfarin PT/INR:  No components found for: PTPATWAR, PTINRWAR  PTT:    Lab Results   Component Value Date    APTT 26.8 12/13/2016   [APTT}  Troponin:  No results found for: TROPONINI  Last 3 Troponin:  No results found for: TROPONINI  U/A:    Lab Results   Component Value Date Peripheral pulses palpable  Musculoskeletal: Muscular strength appears intact. Neuro:  No focal motor defects ; II-XII grossly intact . KERN equally    TELEMETRY: REVIEWED--Telemetry: Atrial fibrillation and Rapid ventricular response    ASSESSMENT:   Principal Problem:    Atrial fibrillation with RVR (HCC)  Active Problems:    Pulmonary edema cardiac cause (HCC)    Hepatic congestion    Bilateral hearing loss    Moderate to severe mitral regurgitation    Systolic and diastolic CHF, acute on chronic (HCC)    COPD (chronic obstructive pulmonary disease) (HCC)    Pulmonary hypertension (HCC)    Bronchiectasis with acute exacerbation (HCC)    Interstitial pulmonary fibrosis (HCC)  Resolved Problems:    * No resolved hospital problems.  *      PLAN:  SEE ORDERS      RE  CHANGES AND FINDINGS   Medications reviewed with patient  GI prophylaxis  DVT prophylaxis  Consultants notes reviewed    Amiodarone 200 mg by mouth daily  Diltiazem drip continues  Apixaban 5 mg twice daily  ABG in view of elevated CO2, venous  Discontinue bumetanide twice daily  Begin bumetanide drip 0.5 mg/h  Keflex 250 mg 3 times daily for UTI until sensitivities available  CT of chest  Pulmonary consult-continuous nasal cannula oxygen requirement, abnormal ABGs  Amiodarone 200 mg daily  Apixaban 5 mg twice a day  Cephalexin 250 mg 3 times daily, UTI  Bumex drip has been discontinued, Demadex 10 mg daily has been started  Mayo-Elena 24/26 0.5 tablets twice daily  Metoprolol succinate 12.5 mg daily  Monitor labs  Tumor markers  Amiodarone 200 mg twice daily  Apixaban 5 mg daily  Cephalexin 250 mg 3 times daily  Diltiazem drip has been discontinued  Toprol-XL 12.5 mg daily  Torsemide increased to 20 mg daily  Entresto 24/25, 0.5 tablets twice daily  Cardiac stress test done today results pending  Pulmonary function test will need done inpatient versus outpatient  Await tumor markers  CT abdomen with oral and IV contrast, marked elevation of CA-125  Apixaban 5 mg twice daily  Cephalexin 250 mg 3 times daily  Amiodarone 200 mg twice daily  Torsemide 20 mg daily  Entresto 24/26 0.5 tablets twice daily  Metoprolol succinate 12.5 mg daily  Acetazolamide 125 mg daily    Discussed with patient and adult child and nursing. Freddydenise Jose A ArredondoDO adrianna  DO     1:59 PM     6/12/2021    TIME > 25 MINUTES    >  50 %  OF  TIME  DISCUSSION               ------------  INFORMATION  -----------      DIET:ADULT DIET; Regular; No Added Salt (3-4 gm)      No Known Allergies      MEDICATION SIDE EFFECTS:none       SCHEDULED MEDS:                                 Scheduled Meds:   sacubitril-valsartan  0.5 tablet Oral BID    metoprolol succinate  12.5 mg Oral Daily    amiodarone  200 mg Oral BID    torsemide  20 mg Oral Daily    cephALEXin  250 mg Oral 3 times per day    pantoprazole  40 mg Oral QAM AC    apixaban  5 mg Oral BID       Continuous Infusions:        Data:       Intake/Output Summary (Last 24 hours) at 6/12/2021 1359  Last data filed at 6/12/2021 0512  Gross per 24 hour   Intake 580 ml   Output 1050 ml   Net -470 ml       Wt Readings from Last 3 Encounters:   06/12/21 83 lb (37.6 kg)   03/07/19 90 lb (40.8 kg)   12/13/16 93 lb (42.2 kg)       Labs: Additional    GLUCOSE:No results for input(s): POCGLU in the last 72 hours. BNP:No results found for: BNP    CRP:   Recent Labs     06/10/21  0526 06/11/21  1500 06/12/21  0510   CRP 2.0* 2.8* 2.7*       ESR:  Recent Labs     06/10/21  0526 06/11/21  1500 06/12/21  0510   SEDRATE 31* 28* 18       RADIOLOGY: REVIEWED AVAILABLE REPORT  CT CHEST WO CONTRAST   Final Result   1. Findings most suggestive of congestive heart failure with mild pulmonary   edema, small bilateral pleural effusions, and small pericardial effusion in   the setting of cardiomegaly. 2. Upper lobe predominant peripheral scarring with small calcification. Scattered pulmonary nodules measure up to an average of 6 mm in diameter.    Please see follow-up recommendations below. 3. Dilated main pulmonary artery which can be seen in the setting of   pulmonary hypertension. 4. Coronary artery disease and aortic valvular calcification. RECOMMENDATIONS:   Multiple pulmonary nodules. Most severe: 6 mm solid pulmonary nodule. Recommend a non-contrast Chest CT at 3-6 months, then another non-contrast   Chest CT at 18-24 months. These guidelines do not apply to immunocompromised patients and patients with   cancer. Follow up in patients with significant comorbidities as clinically   warranted. For lung cancer screening, adhere to Lung-RADS guidelines. Reference: Radiology. 2017; 284(1):228-43. US DUP LOWER EXTREMITIES BILATERAL VENOUS   Final Result   No evidence of DVT in either lower extremity. XR CHEST PORTABLE   Final Result   Hazy opacities are most suspicious for pneumonia. Pulmonary edema would be   an alternative consideration.          NM Cardiac Stress Test Nuclear Imaging    (Results Pending)             Judit Groves DO DO   1:59 PM     6/12/2021      Voice recognition software used for dictation

## 2021-06-12 NOTE — PROGRESS NOTES
Dr. Ashton Child updated that patient is refusing oral contrast, ok for just IV. Left message with CT.

## 2021-06-13 ENCOUNTER — APPOINTMENT (OUTPATIENT)
Dept: CT IMAGING | Age: 76
DRG: 308 | End: 2021-06-13
Payer: MEDICARE

## 2021-06-13 VITALS
WEIGHT: 83 LBS | HEART RATE: 101 BPM | DIASTOLIC BLOOD PRESSURE: 57 MMHG | RESPIRATION RATE: 16 BRPM | SYSTOLIC BLOOD PRESSURE: 86 MMHG | HEIGHT: 66 IN | TEMPERATURE: 97.6 F | BODY MASS INDEX: 13.34 KG/M2 | OXYGEN SATURATION: 99 %

## 2021-06-13 LAB
ALBUMIN SERPL-MCNC: 3.2 G/DL (ref 3.5–5.2)
ALP BLD-CCNC: 133 U/L (ref 35–104)
ALT SERPL-CCNC: 19 U/L (ref 0–32)
ANION GAP SERPL CALCULATED.3IONS-SCNC: 4 MMOL/L (ref 7–16)
AST SERPL-CCNC: 14 U/L (ref 0–31)
BASOPHILS ABSOLUTE: 0.02 E9/L (ref 0–0.2)
BASOPHILS RELATIVE PERCENT: 0.3 % (ref 0–2)
BILIRUB SERPL-MCNC: 0.2 MG/DL (ref 0–1.2)
BILIRUBIN DIRECT: <0.2 MG/DL (ref 0–0.3)
BILIRUBIN, INDIRECT: ABNORMAL MG/DL (ref 0–1)
BUN BLDV-MCNC: 28 MG/DL (ref 6–23)
C-REACTIVE PROTEIN: 2.2 MG/DL (ref 0–0.4)
CALCIUM SERPL-MCNC: 9 MG/DL (ref 8.6–10.2)
CHLORIDE BLD-SCNC: 93 MMOL/L (ref 98–107)
CO2: 46 MMOL/L (ref 22–29)
CREAT SERPL-MCNC: 0.9 MG/DL (ref 0.5–1)
EOSINOPHILS ABSOLUTE: 0.23 E9/L (ref 0.05–0.5)
EOSINOPHILS RELATIVE PERCENT: 3.5 % (ref 0–6)
GFR AFRICAN AMERICAN: >60
GFR NON-AFRICAN AMERICAN: >60 ML/MIN/1.73
GLUCOSE BLD-MCNC: 104 MG/DL (ref 74–99)
HCT VFR BLD CALC: 36.1 % (ref 34–48)
HEMOGLOBIN: 10.2 G/DL (ref 11.5–15.5)
HYPOCHROMIA: ABNORMAL
IMMATURE GRANULOCYTES #: 0.03 E9/L
IMMATURE GRANULOCYTES %: 0.5 % (ref 0–5)
LYMPHOCYTES ABSOLUTE: 0.53 E9/L (ref 1.5–4)
LYMPHOCYTES RELATIVE PERCENT: 8 % (ref 20–42)
MAGNESIUM: 1.9 MG/DL (ref 1.6–2.6)
MCH RBC QN AUTO: 29.1 PG (ref 26–35)
MCHC RBC AUTO-ENTMCNC: 28.3 % (ref 32–34.5)
MCV RBC AUTO: 102.8 FL (ref 80–99.9)
MONOCYTES ABSOLUTE: 0.71 E9/L (ref 0.1–0.95)
MONOCYTES RELATIVE PERCENT: 10.7 % (ref 2–12)
NEUTROPHILS ABSOLUTE: 5.12 E9/L (ref 1.8–7.3)
NEUTROPHILS RELATIVE PERCENT: 77 % (ref 43–80)
OVALOCYTES: ABNORMAL
PDW BLD-RTO: 14.6 FL (ref 11.5–15)
PHOSPHORUS: 2.4 MG/DL (ref 2.5–4.5)
PLATELET # BLD: 290 E9/L (ref 130–450)
PMV BLD AUTO: 9.7 FL (ref 7–12)
POIKILOCYTES: ABNORMAL
POLYCHROMASIA: ABNORMAL
POTASSIUM SERPL-SCNC: 3.3 MMOL/L (ref 3.5–5)
RBC # BLD: 3.51 E12/L (ref 3.5–5.5)
SEDIMENTATION RATE, ERYTHROCYTE: 21 MM/HR (ref 0–20)
SODIUM BLD-SCNC: 143 MMOL/L (ref 132–146)
TOTAL PROTEIN: 6.6 G/DL (ref 6.4–8.3)
WBC # BLD: 6.6 E9/L (ref 4.5–11.5)

## 2021-06-13 PROCEDURE — 36415 COLL VENOUS BLD VENIPUNCTURE: CPT

## 2021-06-13 PROCEDURE — 85025 COMPLETE CBC W/AUTO DIFF WBC: CPT

## 2021-06-13 PROCEDURE — 2700000000 HC OXYGEN THERAPY PER DAY

## 2021-06-13 PROCEDURE — 74178 CT ABD&PLV WO CNTR FLWD CNTR: CPT

## 2021-06-13 PROCEDURE — 85651 RBC SED RATE NONAUTOMATED: CPT

## 2021-06-13 PROCEDURE — 86140 C-REACTIVE PROTEIN: CPT

## 2021-06-13 PROCEDURE — 6370000000 HC RX 637 (ALT 250 FOR IP): Performed by: INTERNAL MEDICINE

## 2021-06-13 PROCEDURE — 80048 BASIC METABOLIC PNL TOTAL CA: CPT

## 2021-06-13 PROCEDURE — 84100 ASSAY OF PHOSPHORUS: CPT

## 2021-06-13 PROCEDURE — 80076 HEPATIC FUNCTION PANEL: CPT

## 2021-06-13 PROCEDURE — 6360000004 HC RX CONTRAST MEDICATION: Performed by: RADIOLOGY

## 2021-06-13 PROCEDURE — 83735 ASSAY OF MAGNESIUM: CPT

## 2021-06-13 RX ORDER — ACETAZOLAMIDE 250 MG/1
250 TABLET ORAL DAILY
Qty: 90 TABLET | Refills: 3 | Status: SHIPPED | OUTPATIENT
Start: 2021-06-14

## 2021-06-13 RX ORDER — AMIODARONE HYDROCHLORIDE 200 MG/1
200 TABLET ORAL 2 TIMES DAILY
Qty: 60 TABLET | Refills: 1 | Status: SHIPPED | OUTPATIENT
Start: 2021-06-13

## 2021-06-13 RX ORDER — TORSEMIDE 20 MG/1
20 TABLET ORAL DAILY
Qty: 30 TABLET | Refills: 3 | Status: SHIPPED | OUTPATIENT
Start: 2021-06-14

## 2021-06-13 RX ORDER — ACETAZOLAMIDE 250 MG/1
250 TABLET ORAL DAILY
Status: DISCONTINUED | OUTPATIENT
Start: 2021-06-14 | End: 2021-06-13 | Stop reason: HOSPADM

## 2021-06-13 RX ORDER — METOPROLOL SUCCINATE 25 MG/1
12.5 TABLET, EXTENDED RELEASE ORAL DAILY
Qty: 30 TABLET | Refills: 3 | Status: SHIPPED | OUTPATIENT
Start: 2021-06-14

## 2021-06-13 RX ADMIN — POTASSIUM & SODIUM PHOSPHATES POWDER PACK 280-160-250 MG 250 MG: 280-160-250 PACK at 13:16

## 2021-06-13 RX ADMIN — AMIODARONE HYDROCHLORIDE 200 MG: 200 TABLET ORAL at 09:51

## 2021-06-13 RX ADMIN — APIXABAN 5 MG: 5 TABLET, FILM COATED ORAL at 09:49

## 2021-06-13 RX ADMIN — PANTOPRAZOLE SODIUM 40 MG: 40 TABLET, DELAYED RELEASE ORAL at 05:54

## 2021-06-13 RX ADMIN — CEPHALEXIN 250 MG: 250 CAPSULE ORAL at 14:30

## 2021-06-13 RX ADMIN — IOPAMIDOL 75 ML: 755 INJECTION, SOLUTION INTRAVENOUS at 10:43

## 2021-06-13 RX ADMIN — TORSEMIDE 20 MG: 20 TABLET ORAL at 09:50

## 2021-06-13 RX ADMIN — SACUBITRIL AND VALSARTAN 0.5 TABLET: 24; 26 TABLET, FILM COATED ORAL at 09:50

## 2021-06-13 RX ADMIN — CEPHALEXIN 250 MG: 250 CAPSULE ORAL at 05:54

## 2021-06-13 RX ADMIN — METOPROLOL SUCCINATE 12.5 MG: 25 TABLET, EXTENDED RELEASE ORAL at 09:49

## 2021-06-13 RX ADMIN — ACETAZOLAMIDE 125 MG: 250 TABLET ORAL at 09:50

## 2021-06-13 ASSESSMENT — PAIN SCALES - GENERAL
PAINLEVEL_OUTOF10: 0
PAINLEVEL_OUTOF10: 0

## 2021-06-13 NOTE — PROGRESS NOTES
PROGRESS NOTE       PATIENT PROBLEM LIST:  Principal Problem:    Atrial fibrillation with RVR (HCC)  Active Problems:    Pulmonary edema cardiac cause (HCC)    Hepatic congestion    Bilateral hearing loss    Moderate to severe mitral regurgitation    Systolic and diastolic CHF, acute on chronic (HCC)    COPD (chronic obstructive pulmonary disease) (HCC)    Pulmonary hypertension (HCC)    Bronchiectasis with acute exacerbation (HCC)    Interstitial pulmonary fibrosis (HCC)  Resolved Problems:    * No resolved hospital problems. *      SUBJECTIVE:  Roslynn Najjar states she wishes to go home and does not understand her present clinical status. REVIEW OF SYSTEMS:  General ROS: negative for - fatigue, malaise,  weight gain or weight loss  Psychological ROS: part of the for - anxiety. Ophthalmic ROS: negative for - decreased vision or visual distortion. ENT ROS: negative  Allergy and Immunology ROS: negative  Hematological and Lymphatic ROS: negative  Endocrine: no heat or cold intolerance and no polyphagia, polydipsia, or polyuria  Respiratory ROS: positive for - shortness of breath  Cardiovascular ROS: positive for - dyspnea on exertion, irregular heartbeat, rapid heart rate and shortness of breath. Gastrointestinal ROS: no abdominal pain, change in bowel habits, or black or bloody stools  Genito-Urinary ROS: no nocturia, dysuria, trouble voiding, frequency or hematuria  Musculoskeletal ROS: negative for- myalgias, arthralgias, or claudication  Neurological ROS: no TIA or stroke symptoms otherwise no significant change in symptoms or problems since yesterday as documented in previous progress notes.     SCHEDULED MEDICATIONS:   [START ON 6/14/2021] acetaZOLAMIDE  250 mg Oral Daily    sacubitril-valsartan  0.5 tablet Oral BID    metoprolol succinate  12.5 mg Oral Daily    amiodarone  200 mg Oral BID    torsemide  20 mg Oral Daily    cephALEXin  250 mg Oral 3 times per day    pantoprazole  40 mg Oral QAM AC    apixaban  5 mg Oral BID       VITAL SIGNS:                                                                                                                          /68 Comment: manual  Pulse 108   Temp 97.9 °F (36.6 °C) (Oral)   Resp 16   Ht 5' 6\" (1.676 m)   Wt 83 lb (37.6 kg)   SpO2 98%   BMI 13.40 kg/m²   Patient Vitals for the past 96 hrs (Last 3 readings):   Weight   06/12/21 0430 83 lb (37.6 kg)   06/11/21 0527 80 lb 8 oz (36.5 kg)   06/10/21 0643 84 lb 3.2 oz (38.2 kg)     OBJECTIVE:    HEENT: PERRL, EOM  Intact; sclera non-icteric, conjunctiva pink. Carotids are brisk in upstroke with normal contour. No carotid bruits. Normal jugular venous pulsation at 45°. No palpable cervical nor supraclavicular nodes. Thyroid not palpable. Trachea midline. Chest: Even excursion  Lungs: CTA B, no expiratory wheezes or rhonchi, no decreased tactile fremitus without inspiratory rales. Heart: Irregular, irregular  rhythm; S1 > S2, no gallop Grade 2/6 early systolic murmur second right intercostal space. No clicks, rub, palpable thrills   or heaves. PMI nondisplaced, 5th intercostal space MCL. Abdomen: Soft, nontender, nondistended,  mildly protuberant, no masses or organomegaly. Bowel sounds active. Extremities: Without clubbing, cyanosis or edema. Pulses present 3+ upper extermities bilaterally; present 2+ DP and barely palpable PT bilaterally.      Data:   Scheduled Meds: Reviewed  Continuous Infusions:     Intake/Output Summary (Last 24 hours) at 6/13/2021 1125  Last data filed at 6/12/2021 1745  Gross per 24 hour   Intake 240 ml   Output    Net 240 ml     CBC:   Recent Labs     06/11/21  1500 06/12/21  0510 06/13/21  0410   WBC 8.1 7.6 6.6   HGB 11.3* 10.7* 10.2*   HCT 39.4 35.9 36.1    290 290     BMP:  Recent Labs     06/11/21  1500 06/12/21  0510 06/13/21  0410    145 143   K 3.5 3.4* 3.3*   CL 91* 94* 93*   CO2 48* 47* 46*   BUN 29* 28* 28*   CREATININE 1.0 0.8 0.9   LABGLOM 54 28±5%.  Normal left ventricular wall thickness. Diastolic function cannot be accurately assessed due to significant mitral  regurgitation. Right Ventricle  Normal right ventricular size. Right ventricle global systolic function is mildly reduced . TAPSE is not noted   Left Atrium  The left atrium is moderately dilated. The LAESV Index is >34 ml/m2. Interatrial septum appears intact. Right Atrium  Moderately enlarged right atrium. Mitral Valve  Mild thickening of the mitral valve leaflets. Increased E point septal separation noted,suggesting decreased LV cardiac  output. Moderate to severe mitral regurgitation is present. PISA radius >1.0-severe   Tricuspid Valve  The tricuspid valve appears structurally normal.  Mild tricuspid regurgitation. RVSP is 29 mmHg. Aortic Valve  The aortic valve is trileaflet. Aortic valve opens well. The aortic valve appears mildly sclerotic. Trace-mild aortic regurgitation is noted. Pulmonic Valve  Pulmonic valve is structurally normal.  Physiologic and/or trace pulmonic regurgitation present. Pericardial Effusion  No evidence of pericardial thickening/calcification present. Pleural Effusion  Moderate-large left pleural effusion. Aorta  Aortic root dimension within normal limits. Ascending aorta appears mildly sclerotic and/or calcified. Miscellaneous  Normal Inferior Vena Cava diameter and respiratory variation. Conclusions   Summary  Left ventricle grossly normal in size. Global hypokinesis is noted to be severe. Estimated left ventricular ejection fraction is 28±5%. Normal left ventricular wall thickness. Diastolic function cannot be accurately assessed due to significant mitral  regurgitation. The left atrium is moderately dilated. The LAESV Index is >34 ml/m2. Normal right ventricular size. Right ventricle global systolic function is mildly reduced . TAPSE is not noted  Trace-mild aortic regurgitation is noted.   Increased E point septal separation noted,suggesting decreased LV cardiac  output. Moderate to severe mitral regurgitation is present. PISA radius >1.0-severe  Mild tricuspid regurgitation. RVSP is 29 mmHg. Physiologic and/or trace pulmonic regurgitation present. Moderate-large left pleural effusion. Technically good quality study. No comparison study available. Suggest clinical correlation.    Signature   ----------------------------------------------------------------  Electronically signed by Meli Minor DO(Interpreting  physician) on 06/10/2021 11:56 AM  ----------------------------------------------------------------  M-Mode/2D Measurements & Calculations   LV Diastolic    LV Systolic Dimension: 4 cm  AV Cusp Separation: 1.7 cmLA  Dimension: 4.4  LV Volume Diastolic: 19.8 ml Dimension: 3.4 cmAO Root  cm              LV Volume Systolic: 51.6 ml  Dimension: 2.9 cm  LV FS:9.1 %     LV EDV/LV EDV Index: 88.3  LV PW           ml/64 ml/m^2LV ESV/LV ESV  Diastolic: 0.8  Index: 30.3 ml/49ml/ m^2  cm              EF Calculated: 22.2 %        RV Diastolic Dimension: 2.3  LV PW Systolic: LV Mass Index: 85 l/min*m^2  cm  0.9 cm          LV Length: 7 cm  Septum                                       LA/Aorta: 4.59  Diastolic: 0.9  LVOT: 2 cm                   Ascending Aorta: 3 cm  cm                                           LA volume/Index: 73.9 ml  Septum                                       /80.8IM/T^2  Systolic: 0.9                                RA Area: 16.8 cm^2  cm  CO: 3.98 l/min                               IVC Expiration: 1.9 cm  CI: 2.86  l/m*m^2  LV Mass: 118.58  g  Doppler Measurements & Calculations   MV Peak E-Wave: 1.03 m/s   AV Peak Velocity:     LVOT Peak Velocity: 1.05                             1.45 m/s              m/s  MV Peak Gradient: 4.3 mmHg AV Peak Gradient:     LVOT Mean Velocity: 0.64  MV Mean Gradient: 2.8 mmHg 8.38 mmHg             m/s  MV Mean Velocity: 0.81 m/s AV Mean Velocity:     LVOT Peak Gradient: EXAMINATION: DUPLEX VENOUS ULTRASOUND OF THE BILATERAL LOWER EXTREMITIES, 6/7/2021 7:06 pm TECHNIQUE: Duplex ultrasound using B-mode/gray scaled imaging and Doppler spectral analysis and color flow was obtained of the bilateral lower extremities. COMPARISON: None used. HISTORY: ORDERING SYSTEM PROVIDED HISTORY: Leg edema rule out DVT TECHNOLOGIST PROVIDED HISTORY: Reason for exam:->Leg edema rule out DVT What reading provider will be dictating this exam?->CRC FINDINGS: The visualized veins of the bilateral lower extremities are patent and free of echogenic thrombus. The veins demonstrate good compressibility with normal color flow study and spectral analysis. Bilateral soft tissue swelling. No evidence of DVT in either lower extremity. EKG: See Report  Echo: See Report      IMPRESSIONS:  Principal Problem:    Atrial fibrillation with RVR (HCC)  Active Problems:    Pulmonary edema cardiac cause (HCC)    Hepatic congestion    Bilateral hearing loss    Moderate to severe mitral regurgitation    Systolic and diastolic CHF, acute on chronic (HCC)    COPD (chronic obstructive pulmonary disease) (HCC)    Pulmonary hypertension (HCC)    Bronchiectasis with acute exacerbation (HCC)    Interstitial pulmonary fibrosis (HCC)  Resolved Problems:    * No resolved hospital problems. *      RECOMMENDATIONS:  Mrs. Eligio Babcock has been placed on medications that hopefully will  help convert her to sinus rhythm and Within reassess her left ventricular function 3 months from the time of conversion. In the interim we will also obtain a nuclear stress test to make sure that her heart failure substrate is not ischemic coronary artery disease.       I have spent more than 25 minutes face to face with Pernell Cote and reviewing notes and laboratory data, with greater than 50% of this time instructing and counseling the patient and her daughter face to face regarding my findings and recommendations and I have answered all questions as posed to me by Ms. Prince Levy her daughter. Cristy Jacob, DO FACP,FACC,FSCAI      NOTE:  This report was transcribed using voice recognition software.   Every effort was made to ensure accuracy; however, inadvertent computerized transcription errors may be present

## 2021-06-13 NOTE — PROGRESS NOTES
PULSE OX ON ROOM AIR SITTING 89%   PULSE OX ON ROOM AIR AMBULATING 83%  PULSE OX ON 2 LITERS SITTING RECOVERY 91%  PULSE OX ON 2 LITERS AMBULATING RECOVERY 94%

## 2021-06-13 NOTE — PROGRESS NOTES
>60 >60     ABGs:   Lab Results   Component Value Date    PH 7.366 06/09/2021    PO2 127.8 06/09/2021    PCO2 67.4 06/09/2021     INR: No results for input(s): INR in the last 72 hours. PRO-BNP:   Lab Results   Component Value Date    PROBNP 1,733 (H) 06/11/2021    PROBNP 1,410 (H) 06/08/2021      TSH:   Lab Results   Component Value Date    TSH 2.400 06/08/2021      Cardiac Injury Profile: No results for input(s): CKTOTAL, CKMB, TROPONINI in the last 72 hours. Lipid Profile:   Lab Results   Component Value Date    TRIG 74 06/08/2021    HDL 44 06/08/2021    LDLCALC 61 06/08/2021    CHOL 120 06/08/2021      Hemoglobin A1C: No components found for: HGBA1C     RAD:   Echo Complete    Result Date: 6/10/2021  Transthoracic Echocardiography Report (TTE)  Demographics   Patient Name      Cleve Aase Gender              Female                    S   Medical Record    [id number]         Room Number         0423  Number   Account #         [de-identified]        Procedure Date      06/08/2021   Corporate ID                       Ordering Physician  Teresita Phipps   Accession Number  [id number]       Referring Physician Sg Bradley   Date of Birth     1945       Sonographer         Nitin Tan RDCS   Age               68 year(s)       Interpreting        Quinn De La Torre DO                                     Physician                                      Any Other  Procedure Type of Study   TTE procedure:Echo Complete W/Doppler & Color Flow. Procedure Date Date: 06/08/2021 Start: 02:44 PM Study Location: Portable Technical Quality: Adequate visualization Indications:Congestive heart failure. Patient Status: Routine Height: 67 inches Weight: 83 pounds BSA: 1.39 m^2 BMI: 13 kg/m^2 HR: 85 bpm BP: 124/90 mmHg  Findings   Left Ventricle  Left ventricle grossly normal in size. Global hypokinesis is noted to be severe.   Estimated left ventricular ejection fraction is 28±5%.  Normal left ventricular wall thickness. Diastolic function cannot be accurately assessed due to significant mitral  regurgitation. Right Ventricle  Normal right ventricular size. Right ventricle global systolic function is mildly reduced . TAPSE is not noted   Left Atrium  The left atrium is moderately dilated. The LAESV Index is >34 ml/m2. Interatrial septum appears intact. Right Atrium  Moderately enlarged right atrium. Mitral Valve  Mild thickening of the mitral valve leaflets. Increased E point septal separation noted,suggesting decreased LV cardiac  output. Moderate to severe mitral regurgitation is present. PISA radius >1.0-severe   Tricuspid Valve  The tricuspid valve appears structurally normal.  Mild tricuspid regurgitation. RVSP is 29 mmHg. Aortic Valve  The aortic valve is trileaflet. Aortic valve opens well. The aortic valve appears mildly sclerotic. Trace-mild aortic regurgitation is noted. Pulmonic Valve  Pulmonic valve is structurally normal.  Physiologic and/or trace pulmonic regurgitation present. Pericardial Effusion  No evidence of pericardial thickening/calcification present. Pleural Effusion  Moderate-large left pleural effusion. Aorta  Aortic root dimension within normal limits. Ascending aorta appears mildly sclerotic and/or calcified. Miscellaneous  Normal Inferior Vena Cava diameter and respiratory variation. Conclusions   Summary  Left ventricle grossly normal in size. Global hypokinesis is noted to be severe. Estimated left ventricular ejection fraction is 28±5%. Normal left ventricular wall thickness. Diastolic function cannot be accurately assessed due to significant mitral  regurgitation. The left atrium is moderately dilated. The LAESV Index is >34 ml/m2. Normal right ventricular size. Right ventricle global systolic function is mildly reduced . TAPSE is not noted  Trace-mild aortic regurgitation is noted.   Increased E point septal separation noted,suggesting decreased LV cardiac  output. Moderate to severe mitral regurgitation is present. PISA radius >1.0-severe  Mild tricuspid regurgitation. RVSP is 29 mmHg. Physiologic and/or trace pulmonic regurgitation present. Moderate-large left pleural effusion. Technically good quality study. No comparison study available. Suggest clinical correlation.    Signature   ----------------------------------------------------------------  Electronically signed by Zoey Merino DO(Interpreting  physician) on 06/10/2021 11:56 AM  ----------------------------------------------------------------  M-Mode/2D Measurements & Calculations   LV Diastolic    LV Systolic Dimension: 4 cm  AV Cusp Separation: 1.7 cmLA  Dimension: 4.4  LV Volume Diastolic: 38.2 ml Dimension: 3.4 cmAO Root  cm              LV Volume Systolic: 97.0 ml  Dimension: 2.9 cm  LV FS:9.1 %     LV EDV/LV EDV Index: 88.3  LV PW           ml/64 ml/m^2LV ESV/LV ESV  Diastolic: 0.8  Index: 27.1 ml/49ml/ m^2  cm              EF Calculated: 22.2 %        RV Diastolic Dimension: 2.3  LV PW Systolic: LV Mass Index: 85 l/min*m^2  cm  0.9 cm          LV Length: 7 cm  Septum                                       LA/Aorta: 6.25  Diastolic: 0.9  LVOT: 2 cm                   Ascending Aorta: 3 cm  cm                                           LA volume/Index: 73.9 ml  Septum                                       /52.7MS/O^0  Systolic: 0.9                                RA Area: 16.8 cm^2  cm  CO: 3.98 l/min                               IVC Expiration: 1.9 cm  CI: 2.86  l/m*m^2  LV Mass: 118.58  g  Doppler Measurements & Calculations   MV Peak E-Wave: 1.03 m/s   AV Peak Velocity:     LVOT Peak Velocity: 1.05                             1.45 m/s              m/s  MV Peak Gradient: 4.3 mmHg AV Peak Gradient:     LVOT Mean Velocity: 0.64  MV Mean Gradient: 2.8 mmHg 8.38 mmHg             m/s  MV Mean Velocity: 0.81 m/s AV Mean Velocity:     LVOT Peak Gradient: 4.4  MV P1/2t: 82.1 msec        1.01 m/s              mmHgLVOT Mean Gradient: 2  MVA by PHT:2.68 cm^2       AV Mean Gradient: 4.6 mmHg  MV Area (continuity): 2    mmHg  cm^2                       AV VTI: 24.9 cm                             AV Area                             (Continuity):1.88     TR Velocity:2.54 m/s  MR Velocity: 5.37 m/s      cm^2                  TR Gradient:25.79 mmHg  MV MONY PISA: 0.58 cm^2     AV Deceleration Time: PV Peak Velocity: 0.73  MR VTI: 147.9 cm           2244.5 msec           m/s  Alias Velocity: 0.41       LVOT VTI: 14.9 cm     PV Peak Gradient: 2.1  m/sPISA Radius: 1.1 cm     IVRT: 83 msec         mmHg                                                   PV Mean Velocity: 0.53  PISA area: 7.6 cm^2MR flow                       m/s  rate: 310.84 ml/sMR                              PV Mean Gradient: 1.3  volume:85.78 ml                                  mmHg  [email or web address] [id number]    XR CHEST PORTABLE    Result Date: 6/7/2021  EXAMINATION: ONE XRAY VIEW OF THE CHEST 6/7/2021 7:43 am COMPARISON: One-view chest x-ray 11/23/2015. CT chest 01/15/2009. HISTORY: ORDERING SYSTEM PROVIDED HISTORY: cough TECHNOLOGIST PROVIDED HISTORY: Reason for exam:->cough FINDINGS: There is mild-moderate enlargement of the cardiac silhouette, which may be exaggerated by AP technique. The mediastinal contours are within normal limits. The lungs appear hyperinflated. There are hazy opacities in the perihilar and basilar regions, left greater than right. Scarring is redemonstrated in the lung apices. No significant pleural effusions or pneumothorax. The bones appear osteopenic. EKG leads overlie the chest.     Hazy opacities are most suspicious for pneumonia. Pulmonary edema would be an alternative consideration.      US DUP LOWER EXTREMITIES BILATERAL VENOUS    Result Date: 6/7/2021 EXAMINATION: DUPLEX VENOUS ULTRASOUND OF THE BILATERAL LOWER EXTREMITIES, 6/7/2021 7:06 pm TECHNIQUE: Duplex ultrasound using B-mode/gray scaled imaging and Doppler spectral analysis and color flow was obtained of the bilateral lower extremities. COMPARISON: None used. HISTORY: ORDERING SYSTEM PROVIDED HISTORY: Leg edema rule out DVT TECHNOLOGIST PROVIDED HISTORY: Reason for exam:->Leg edema rule out DVT What reading provider will be dictating this exam?->CRC FINDINGS: The visualized veins of the bilateral lower extremities are patent and free of echogenic thrombus. The veins demonstrate good compressibility with normal color flow study and spectral analysis. Bilateral soft tissue swelling. No evidence of DVT in either lower extremity. EKG: See Report  Echo: See Report      IMPRESSIONS:  Principal Problem:    Atrial fibrillation with RVR (HCC)  Active Problems:    Pulmonary edema cardiac cause (HCC)    Hepatic congestion    Bilateral hearing loss    Moderate to severe mitral regurgitation    Systolic and diastolic CHF, acute on chronic (HCC)    COPD (chronic obstructive pulmonary disease) (HCC)    Pulmonary hypertension (HCC)    Bronchiectasis with acute exacerbation (HCC)    Interstitial pulmonary fibrosis (HCC)  Resolved Problems:    * No resolved hospital problems. *      RECOMMENDATIONS:  Mrs. Corinne Faulkner has been placed on medications that hopefully will  help convert her to sinus rhythm and Within reassess her left ventricular function 3 months from the time of conversion. In the interim we will also obtain a nuclear stress test to make sure that her heart failure substrate is not ischemic coronary artery disease.       I have spent more than 25 minutes face to face with Helder Reynoso and reviewing notes and laboratory data, with greater than 50% of this time instructing and counseling the patient and her daughter face to face regarding my findings and recommendations and I have answered all

## 2021-06-13 NOTE — PROGRESS NOTES
PROGRESS NOTE       PATIENT PROBLEM LIST:  Principal Problem:    Atrial fibrillation with RVR (HCC)  Active Problems:    Pulmonary edema cardiac cause (HCC)    Hepatic congestion    Bilateral hearing loss    Moderate to severe mitral regurgitation    Systolic and diastolic CHF, acute on chronic (HCC)    COPD (chronic obstructive pulmonary disease) (HCC)    Pulmonary hypertension (HCC)    Bronchiectasis with acute exacerbation (HCC)    Interstitial pulmonary fibrosis (HCC)  Resolved Problems:    * No resolved hospital problems. *      SUBJECTIVE:  Dori [place] states she wishes to go home and does not understand her present clinical status. REVIEW OF SYSTEMS:  General ROS: negative for - fatigue, malaise,  weight gain or weight loss  Psychological ROS: part of the for - anxiety. Ophthalmic ROS: negative for - decreased vision or visual distortion. ENT ROS: negative  Allergy and Immunology ROS: negative  Hematological and Lymphatic ROS: negative  Endocrine: no heat or cold intolerance and no polyphagia, polydipsia, or polyuria  Respiratory ROS: positive for - shortness of breath  Cardiovascular ROS: positive for - dyspnea on exertion, irregular heartbeat, rapid heart rate and shortness of breath. Gastrointestinal ROS: no abdominal pain, change in bowel habits, or black or bloody stools  Genito-Urinary ROS: no nocturia, dysuria, trouble voiding, frequency or hematuria  Musculoskeletal ROS: negative for- myalgias, arthralgias, or claudication  Neurological ROS: no TIA or stroke symptoms otherwise no significant change in symptoms or problems since yesterday as documented in previous progress notes.     SCHEDULED MEDICATIONS:   [START ON 6/14/2021] acetaZOLAMIDE  250 mg Oral Daily    sacubitril-valsartan  0.5 tablet Oral BID    metoprolol succinate  12.5 mg Oral Daily    amiodarone  200 mg Oral BID    torsemide  20 mg Oral Daily    cephALEXin  250 mg Oral 3 times per day    pantoprazole  40 mg Oral QAM AC    apixaban  5 mg Oral BID       VITAL SIGNS:                                                                                                                          /68 Comment: manual  Pulse 108   Temp 97.9 °F (36.6 °C) (Oral)   Resp 16   Ht 5' 6\" (1.676 m)   Wt 83 lb (37.6 kg)   SpO2 98%   BMI 13.40 kg/m²   Patient Vitals for the past 96 hrs (Last 3 readings):   Weight   06/12/21 0430 83 lb (37.6 kg)   06/11/21 0527 80 lb 8 oz (36.5 kg)   06/10/21 0643 84 lb 3.2 oz (38.2 kg)     OBJECTIVE:    HEENT: PERRL, EOM  Intact; sclera non-icteric, conjunctiva pink. Carotids are brisk in upstroke with normal contour. No carotid bruits. Normal jugular venous pulsation at 45°. No palpable cervical nor supraclavicular nodes. Thyroid not palpable. Trachea midline. Chest: Even excursion  Lungs: CTA B, no expiratory wheezes or rhonchi, no decreased tactile fremitus without inspiratory rales. Heart: Irregular, irregular  rhythm; S1 > S2, no gallop Grade 2/6 early systolic murmur second right intercostal space. No clicks, rub, palpable thrills   or heaves. PMI nondisplaced, 5th intercostal space MCL. Abdomen: Soft, nontender, nondistended,  mildly protuberant, no masses or organomegaly. Bowel sounds active. Extremities: Without clubbing, cyanosis or edema. Pulses present 3+ upper extermities bilaterally; present 2+ DP and barely palpable PT bilaterally.      Data:   Scheduled Meds: Reviewed  Continuous Infusions:     Intake/Output Summary (Last 24 hours) at 6/13/2021 1126  Last data filed at 6/12/2021 1745  Gross per 24 hour   Intake 240 ml   Output    Net 240 ml     CBC:   Recent Labs     06/11/21  1500 06/12/21  0510 06/13/21  0410   WBC 8.1 7.6 6.6   HGB 11.3* 10.7* 10.2*   HCT 39.4 35.9 36.1    290 290     BMP:  Recent Labs     06/11/21  1500 06/12/21  0510 06/13/21  0410    145 143   K 3.5 3.4* 3.3*   CL 91* 94* 93*   CO2 48* 47* 46*   BUN 29* 28* 28*   CREATININE 1.0 0.8 0.9   LABGLOM 54 >60 >60     ABGs:   Lab Results   Component Value Date    PH 7.366 06/09/2021    PO2 127.8 06/09/2021    PCO2 67.4 06/09/2021     INR: No results for input(s): INR in the last 72 hours. PRO-BNP:   Lab Results   Component Value Date    PROBNP 1,733 (H) 06/11/2021    PROBNP 1,410 (H) 06/08/2021      TSH:   Lab Results   Component Value Date    TSH 2.400 06/08/2021      Cardiac Injury Profile: No results for input(s): CKTOTAL, CKMB, TROPONINI in the last 72 hours. Lipid Profile:   Lab Results   Component Value Date    TRIG 74 06/08/2021    HDL 44 06/08/2021    LDLCALC 61 06/08/2021    CHOL 120 06/08/2021      Hemoglobin A1C: No components found for: HGBA1C     RAD:   Echo Complete    Result Date: 6/10/2021  Transthoracic Echocardiography Report (TTE)  Demographics   Patient Name      Yanci Penaloza Gender              Female                    S   Medical Record    [id number]         Room Number         0423  Number   Account #         [de-identified]        Procedure Date      06/08/2021   Corporate ID                       Ordering Physician  Carol Rodriges   Accession Number  [id number]       Referring Physician Conner Sims   Date of Birth     1945       Sonographer         Victoriano Katz RD   Age               68 year(s)       Interpreting        Marlene Rainey DO                                     Physician                                      Any Other  Procedure Type of Study   TTE procedure:Echo Complete W/Doppler & Color Flow. Procedure Date Date: 06/08/2021 Start: 02:44 PM Study Location: Portable Technical Quality: Adequate visualization Indications:Congestive heart failure. Patient Status: Routine Height: 67 inches Weight: 83 pounds BSA: 1.39 m^2 BMI: 13 kg/m^2 HR: 85 bpm BP: 124/90 mmHg  Findings   Left Ventricle  Left ventricle grossly normal in size. Global hypokinesis is noted to be severe.   Estimated left ventricular ejection fraction is 28±5%.  Normal left ventricular wall thickness. Diastolic function cannot be accurately assessed due to significant mitral  regurgitation. Right Ventricle  Normal right ventricular size. Right ventricle global systolic function is mildly reduced . TAPSE is not noted   Left Atrium  The left atrium is moderately dilated. The LAESV Index is >34 ml/m2. Interatrial septum appears intact. Right Atrium  Moderately enlarged right atrium. Mitral Valve  Mild thickening of the mitral valve leaflets. Increased E point septal separation noted,suggesting decreased LV cardiac  output. Moderate to severe mitral regurgitation is present. PISA radius >1.0-severe   Tricuspid Valve  The tricuspid valve appears structurally normal.  Mild tricuspid regurgitation. RVSP is 29 mmHg. Aortic Valve  The aortic valve is trileaflet. Aortic valve opens well. The aortic valve appears mildly sclerotic. Trace-mild aortic regurgitation is noted. Pulmonic Valve  Pulmonic valve is structurally normal.  Physiologic and/or trace pulmonic regurgitation present. Pericardial Effusion  No evidence of pericardial thickening/calcification present. Pleural Effusion  Moderate-large left pleural effusion. Aorta  Aortic root dimension within normal limits. Ascending aorta appears mildly sclerotic and/or calcified. Miscellaneous  Normal Inferior Vena Cava diameter and respiratory variation. Conclusions   Summary  Left ventricle grossly normal in size. Global hypokinesis is noted to be severe. Estimated left ventricular ejection fraction is 28±5%. Normal left ventricular wall thickness. Diastolic function cannot be accurately assessed due to significant mitral  regurgitation. The left atrium is moderately dilated. The LAESV Index is >34 ml/m2. Normal right ventricular size. Right ventricle global systolic function is mildly reduced . TAPSE is not noted  Trace-mild aortic regurgitation is noted.   Increased E point septal separation noted,suggesting decreased LV cardiac  output. Moderate to severe mitral regurgitation is present. PISA radius >1.0-severe  Mild tricuspid regurgitation. RVSP is 29 mmHg. Physiologic and/or trace pulmonic regurgitation present. Moderate-large left pleural effusion. Technically good quality study. No comparison study available. Suggest clinical correlation.    Signature   ----------------------------------------------------------------  Electronically signed by Zenobia SPAINInterpreting  physician) on 06/10/2021 11:56 AM  ----------------------------------------------------------------  M-Mode/2D Measurements & Calculations   LV Diastolic    LV Systolic Dimension: 4 cm  AV Cusp Separation: 1.7 cmLA  Dimension: 4.4  LV Volume Diastolic: 04.1 ml Dimension: 3.4 cmAO Root  cm              LV Volume Systolic: 16.7 ml  Dimension: 2.9 cm  LV FS:9.1 %     LV EDV/LV EDV Index: 88.3  LV PW           ml/64 ml/m^2LV ESV/LV ESV  Diastolic: 0.8  Index: 40.8 ml/49ml/ m^2  cm              EF Calculated: 22.2 %        RV Diastolic Dimension: 2.3  LV PW Systolic: LV Mass Index: 85 l/min*m^2  cm  0.9 cm          LV Length: 7 cm  Septum                                       LA/Aorta: 8.42  Diastolic: 0.9  LVOT: 2 cm                   Ascending Aorta: 3 cm  cm                                           LA volume/Index: 73.9 ml  Septum                                       /98.9YB/V^3  Systolic: 0.9                                RA Area: 16.8 cm^2  cm  CO: 3.98 l/min                               IVC Expiration: 1.9 cm  CI: 2.86  l/m*m^2  LV Mass: 118.58  g  Doppler Measurements & Calculations   MV Peak E-Wave: 1.03 m/s   AV Peak Velocity:     LVOT Peak Velocity: 1.05                             1.45 m/s              m/s  MV Peak Gradient: 4.3 mmHg AV Peak Gradient:     LVOT Mean Velocity: 0.64  MV Mean Gradient: 2.8 mmHg 8.38 mmHg             m/s  MV Mean Velocity: 0.81 m/s AV Mean Velocity:     LVOT Peak Gradient: questions as posed to me by Ms. Gillian Patel her daughter. Cassius Figures, DO FACP,FACC,FSCAI      NOTE:  This report was transcribed using voice recognition software.   Every effort was made to ensure accuracy; however, inadvertent computerized transcription errors may be present

## 2021-06-13 NOTE — PROGRESS NOTES
PROGRESS  NOTE --                                                          INTERNAL  MEDICINE                                                                              I  PERSONALLY SAW , EXAMINED, AND CARED [facility], 6/13/2021     LABS, XRAY ,CHART, AND MEDICATIONS  REVIEWED BY ME       Chief complaint: Short of breath, leg edema      6/9/2021-SUBJECTIVE: Disha Banks is alert awake and cooperative; oriented ×3. Denies any chest pain dyspnea nausea emesis. Tolerating diet. No abdominal pain. Sitting in bedside chair; daughter present through the exam.  Patient feels well hoping for discharge. Daughter states patient has had episodes of shortness of breath and fatigue off and on for last month or 2, no chest pain. Over nothing as bad as symptoms at the time of this admission. Afebrile last 24 hours. Most recent pulse 138 blood pressure 110/65.  96% saturation 4 L nasal cannula. Patient dropped to SPO2 of 80 on room air earlier this morning. Intake and output -2144 cc. CO2 37 BUN 25 creatinine 0.8. Glucose 100 CRP 1.6 albumin 3.4 alkaline phosphatase 180 ALT 38. WBC 7.7 hemoglobin 10.1 platelets 218. A1c 5.6 TSH 2.400. Sed rate 15. Viral respiratory panel, SARS-CoV-2 all not detected. Doppler/ultrasound both lower extremities negative for DVT. 2D echo completed with following results--        Summary   Left ventricle grossly normal in size. Global hypokinesis is noted to be severe. Estimated left ventricular ejection fraction is 28±5%. Normal left ventricular wall thickness. Diastolic function cannot be accurately assessed due to significant mitral   regurgitation. The left atrium is moderately dilated. The LAESV Index is >34 ml/m2. Normal right ventricular size. Right ventricle global systolic function is mildly reduced .    TAPSE is not noted   Trace-mild aortic regurgitation is noted.   Increased E point septal separation noted,suggesting decreased LV cardiac   output. Moderate to severe mitral regurgitation is present. PISA radius >1.0-severe   Mild tricuspid regurgitation. RVSP is 29 mmHg. Physiologic and/or trace pulmonic regurgitation present. Technically good quality study. No comparison study available. Suggest clinical correlation. Cardiology consult from yesterday appreciated; amiodarone has been started; patient has finally agreed to taking apixaban by mouth.  note from yesterday; patient was given a coupon for 30-day supply of apixaban. Physical therapy and PAC score from today 20/24. Urine culture as follows--      Component Collected Lab   Organism Abnormal  06/07/2021  9:41 PM [address] [facility]   Escherichia coli    Urine Culture, Routine 06/07/2021  9:41 PM  - [facility]   >100,000 CFU/ml   Sensitivity to follow        6/10/2021-patient sitting quietly in bedside chair; nasal cannula oxygen in place. No chest pain; still with mild dyspnea. Appetite fair. Still has significant peripheral edema, not much better since admission. 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 Afebrile last 24 hours. Pulse 100 blood pressure 108/71.  2 L nasal cannula 100% SPO2. Intake and output -1754 cc. Chloride 92 CO2 increased to 43 calcium slightly low 8.4. Alkaline phosphatase 176 ALT 33. CRP 2.0. Hemoglobin 10.6 WBC 8.2. ABGs from yesterday, done on 3 L nasal cannula PCO2 67.4 PO2 of 127.8 pH 7.366. Physical therapy and PAC score from yesterday 20/24.   Urine culture sensitivities not available as follows--    Narrative  Performed by: [address] [place] [address] Lab  Source: URINE       Site: Urine&Urine (20)             Susceptibility    Escherichia coli (1)    Antibiotic Interpretation CHARMAINE Status    ampicillin Sensitive <=^2 mcg/mL     ceFAZolin Sensitive <=^4 mcg/mL     cefepime Sensitive <=^0.12 mcg/mL     cefTRIAXone Sensitive <=^0.25 mcg/mL     Confirmatory Extended Spectrum Beta-Lactamase Negative Neg mcg/mL     ertapenem Sensitive <=^0.12 mcg/mL     gentamicin Sensitive <=^1 mcg/mL     levofloxacin Sensitive <=^0.12 mcg/mL     nitrofurantoin Sensitive <=^16 mcg/mL     piperacillin-tazobactam Sensitive <=^4 mcg/mL     trimethoprim-sulfamethoxazole Sensitive <=^20 mcg/mL         6/11/2021-sitting in bedside chair; nasal cannula oxygen in place. No dyspnea or chest pain at rest.  She just returned from cardiac stress test.  Since that she did work as a  for many years, she was exposed to significant secondhand smoke over those years. COPD discussed with her; also the need for pulmonary functions in patient or outpatient. Afebrile last 24 hours. Pulse rate still elevated 132. Blood pressure 105/76. SPO2 79 on room air. Prior to that 98% on 2 L nasal cannula. Intake and output -2264 cc. Weight has decreased from 93 pounds currently 80 pounds. Labs from today pending. FINDINGS:   Mediastinum: The heart is enlarged.  Mild diffuse ectasia of the ascending   aorta to 3.8 cm without evidence of focal aneurysm.  Small pericardial   effusion.  The main pulmonary artery is enlarged to 3.6 cm, which can be seen   in the setting of pulmonary hypertension.  Coronary artery calcifications and   aortic valvular calcifications are noted.  The central airways are patent. Lungs/pleura: Small bilateral pleural effusions.  Biapical calcification and   scarring.  Bilateral infrahilar ground-glass opacities are consistent with   the given history of pulmonary edema.  A few scattered pulmonary nodules   bilaterally measure up to 7 x 5 mm. Upper Abdomen: Limited images of the upper abdomen are without acute findings.      Soft Tissues/Bones: No acute bony abnormality.  No axillary or   supraclavicular lymphadenopathy.       Impression:       1. Findings most suggestive of congestive heart failure with mild pulmonary   edema, small bilateral pleural effusions, and small pericardial effusion in   the setting of cardiomegaly. 2. Upper lobe predominant peripheral scarring with small calcification. Scattered pulmonary nodules measure up to an average of 6 mm in diameter. Please see follow-up recommendations below. 3. Dilated main pulmonary artery which can be seen in the setting of   pulmonary hypertension. 4. Coronary artery disease and aortic valvular calcification. RECOMMENDATIONS:   Multiple pulmonary nodules. Most severe: 6 mm solid pulmonary nodule. Recommend a non-contrast Chest CT at 3-6 months, then another non-contrast   Chest CT at 18-24 months. These guidelines do not apply to immunocompromised patients and patients with   cancer. Follow up in patients with significant comorbidities as clinically   warranted. For lung cancer screening, adhere to Lung-RADS guidelines. Reference: Radiology. 2017; 284(1):228-43. Pulmonary consult from yesterday appreciated. Previous CT of chest revealed emphysematous changes bilaterally. Hypercapnia due to metabolic alkalosis and respiratory compensation and primary respiratory acidosis with a normal pH. Cardiology note from yesterday; patient still does not understand her heart issues. Hopefully will convert to sinus rhythm; reassess left ventricular function in 3 months from time of conversion. Obtain nuclear stress test to rule out ischemic coronary disease.  note from today, patient does not use oxygen at home, [id number] DME following. Declines home health care. 6/12/2021-patient sitting quietly in bed; no chest pain. She states she is not having any dyspnea up to the bathroom; however she still desaturates without oxygen.   History again reviewed; she is never had hysterectomy, oophorectomy or other. Has been \"many many years\" since last pelvic exam.  She apparently did have pulmonary functions done yesterday; await pulmonologist review. Results are in soft chart. Afebrile last 24 hours. Heart rate still rapid between 112 and 122/min. Blood pressure 92/64; 93% saturation on 2 L nasal cannula. Intake and output -2734 cc. Weight increased, currently 83 pounds have been 80 pounds yesterday. Potassium 3.4 chloride 94 CO2 still elevated 47. BUN 28 creatinine 0.8 calcium 8.4. Albumin 3.2 CRP decreasing 2.7.  proBNP still elevated 1733. Hemoglobin 10.7 WBC 7.6 platelets 604. Sed rate 18. C A-125 markedly elevated 154.4. CEA normal 2.4. Pulmonary note from yesterday appreciated. CT compatible with mild interstitial lung disease with apical fibrosis, bronchiectasis and some COPD. Given occupational history likely due to years of secondary smoke working as a . Right upper lobe will need to be followed. Await pulmonary functions. Physical therapy AMPAC score from yesterday 20/24. Cardiac stress test from yesterday, results still pending. 6/13/2021-patient sitting up in bed no chest pain no dyspnea; nasal cannula oxygen in place. Patient has been seen this morning by cardiology; I spoke personally with cardiology they believe she is okay for discharge. Cardiology unable to explain discrepancy between EF on 2D echo and stress test; reviewed both in both seem accurate although discordant. I discussed with her possible need for home oxygen in view of SPO2. Afebrile last 24 hours. Most recent pulse 108; blood pressure 110/68.  98% saturation on 1 L nasal cannula. Intake and output -2494 cc. Potassium 3.3 chloride 93 CO2 still markedly elevated 46. BUN 28 creatinine 0.9. Magnesium 1.9 phosphorus 2.4. Albumin 3.2 alkaline phosphatase 133 remainder of liver functions normal.  Hemoglobin 10.2 WBC 6.6. Platelets 754,241. ESR 21.  CT of abdomen pelvis pending.   Patient refused oral contrast for the abdominal CT. Results of cardiac stress test as follows--      FINDINGS: The overall quality of the study was good. Left ventricular cavity size was noted to be normal. EDV 46 mL     Rotational analog analysis demonstrated significant artifact secondary   to marked increase in gastrointestinal uptake. The gated SPECT stress imaging in the short, vertical long, and   horizontal long axes demonstrate normal homogeneous tracer   distribution throughout the myocardium. Likewise there is normal   brightening and thickening of all myocardial segments. Gated SPECT left ventricular ejection fraction was calculated to be   52%, with essentially normal myocardial segmental wall motion.       Impression:         1. Normal pharmacologic stress myocardial perfusion imaging without   evidence for ischemia. 2. Essentially normal global left ventricular segmental wall motion   with calculated gated SPECT left ventricular ejection fraction of 52%.           Objective:     PHYSICAL EXAM:    VS: /68 Comment: manual  Pulse 108   Temp 97.9 °F (36.6 °C) (Oral)   Resp 16   Ht 5' 6\" (1.676 m)   Wt 83 lb (37.6 kg)   SpO2 98%   BMI 13.40 kg/m²     Labs:   CBC:   Lab Results   Component Value Date    WBC 6.6 06/13/2021    RBC 3.51 06/13/2021    HGB 10.2 06/13/2021    HCT 36.1 06/13/2021    .8 06/13/2021    MCH 29.1 06/13/2021    MCHC 28.3 06/13/2021    RDW 14.6 06/13/2021     06/13/2021    MPV 9.7 06/13/2021     CBC with Differential:    Lab Results   Component Value Date    WBC 6.6 06/13/2021    RBC 3.51 06/13/2021    HGB 10.2 06/13/2021    HCT 36.1 06/13/2021     06/13/2021    .8 06/13/2021    MCH 29.1 06/13/2021    MCHC 28.3 06/13/2021    RDW 14.6 06/13/2021    NRBC 0.0 06/10/2021    LYMPHOPCT 8.0 06/13/2021    MONOPCT 10.7 06/13/2021    BASOPCT 0.3 06/13/2021    MONOSABS 0.71 06/13/2021    LYMPHSABS 0.53 06/13/2021    EOSABS 0.23 06/13/2021    CIRA 5-10 11/23/2015    RBCUA 0-1 11/23/2015    BACTERIA MANY 11/23/2015    CLARITYU Clear 11/23/2015    SPECGRAV 1.025 11/23/2015    LEUKOCYTESUR Negative 11/23/2015    UROBILINOGEN 0.2 11/23/2015    BILIRUBINUR Negative 11/23/2015    BLOODU MODERATE 11/23/2015    GLUCOSEU Negative 11/23/2015     ABG:    Lab Results   Component Value Date    PH 7.366 06/09/2021    PCO2 67.4 06/09/2021    PO2 127.8 06/09/2021    HCO3 37.7 06/09/2021    BE 10.2 06/09/2021    O2SAT 98.7 06/09/2021     HgBA1c:    Lab Results   Component Value Date    LABA1C 5.6 06/08/2021     FLP:    Lab Results   Component Value Date    TRIG 74 06/08/2021    HDL 44 06/08/2021    LDLCALC 61 06/08/2021    LABVLDL 15 06/08/2021     TSH:    Lab Results   Component Value Date    TSH 2.400 06/08/2021     VITAMIN B12: No components found for: B12  FOLATE:    Lab Results   Component Value Date    FOLATE 20.0 06/08/2021     IRON:    Lab Results   Component Value Date    IRON 38 06/08/2021     Iron Saturation:  No components found for: PERCENTFE  TIBC:    Lab Results   Component Value Date    TIBC 251 06/08/2021     FERRITIN:    Lab Results   Component Value Date    FERRITIN 82 06/08/2021        General appearance: Alert, Awake, Oriented times 3, no distress; nasal cannula oxygen in place  Skin: Warm and dry ; no rashes  Head: Normocephalic. No masses, lesions or tenderness noted  Eyes: Conjunctivae pink, sclera white. PERRL,EOM-I  Ears: External ears normal; mildly hard of hearing  Nose/Sinuses: Nares normal. Septum midline. Mucosa normal. No drainage  Oropharynx: Oropharynx clear with no exudate seen  Neck: Supple. No jugular venous distension, lymphadenopathy or thyromegaly Trachea midline  Lungs: Mostly clear  Heart: Irregularly irregular at 100/min  Abdomen: Soft, non-tender. BS normal. No masses, organomegaly; no rebound or guarding  Extremities: Trace edema, Peripheral pulses palpable  Musculoskeletal: Muscular strength appears intact.    Neuro:  No focal motor defects ; II-XII grossly intact . KERN equally    TELEMETRY: REVIEWED--Telemetry: Atrial fibrillation and Rapid ventricular response    ASSESSMENT:   Principal Problem:    Atrial fibrillation with RVR (HCC)  Active Problems:    Pulmonary edema cardiac cause (HCC)    Hepatic congestion    Bilateral hearing loss    Moderate to severe mitral regurgitation    Systolic and diastolic CHF, acute on chronic (HCC)    COPD (chronic obstructive pulmonary disease) (HCC)    Pulmonary hypertension (HCC)    Bronchiectasis with acute exacerbation (HCC)    Interstitial pulmonary fibrosis (HCC)  Resolved Problems:    * No resolved hospital problems.  *      PLAN:  SEE ORDERS      RE  CHANGES AND FINDINGS   Medications reviewed with patient  GI prophylaxis  DVT prophylaxis  Consultants notes reviewed    Amiodarone 200 mg by mouth daily  Diltiazem drip continues  Apixaban 5 mg twice daily  ABG in view of elevated CO2, venous  Discontinue bumetanide twice daily  Begin bumetanide drip 0.5 mg/h  Keflex 250 mg 3 times daily for UTI until sensitivities available  CT of chest  Pulmonary consult-continuous nasal cannula oxygen requirement, abnormal ABGs  Amiodarone 200 mg daily  Apixaban 5 mg twice a day  Cephalexin 250 mg 3 times daily, UTI  Bumex drip has been discontinued, Demadex 10 mg daily has been started  Jia Lamp 24/26 0.5 tablets twice daily  Metoprolol succinate 12.5 mg daily  Monitor labs  Tumor markers  Amiodarone 200 mg twice daily  Apixaban 5 mg daily  Cephalexin 250 mg 3 times daily  Diltiazem drip has been discontinued  Toprol-XL 12.5 mg daily  Torsemide increased to 20 mg daily  Entresto 24/25, 0.5 tablets twice daily  Cardiac stress test done today results pending  Pulmonary function test will need done inpatient versus outpatient  Await tumor markers  CT abdomen with oral and IV contrast, marked elevation of CA-125  Apixaban 5 mg twice daily  Cephalexin 250 mg 3 times daily  Amiodarone 200 mg twice daily  Torsemide 20 mg daily  Entresto 24/26 0.5 tablets twice daily  Metoprolol succinate 12.5 mg daily  Acetazolamide 125 mg daily  Increase acetazolamide 250 mg daily  Probable discharge today when pulse oximetry completed  Prescriptions written  Medications reviewed  Med rec completed  Follow-up PCP 1 week  Follow-up cardiology per their instructions  Follow-up pulmonary per their instructions  Amiodarone 200 mg twice daily  Apixaban 5 mg twice daily  Cephalexin has completed  Metoprolol succinate 12.5 mg daily  Entresto 24/26 0.5 tablets twice daily  Torsemide 20 mg daily      Discussed with patient and adult child and nursing. Donna Storm DO  DO     10:39 AM     6/13/2021    TIME > 25 MINUTES    >  50 %  OF  TIME  DISCUSSION               ------------  INFORMATION  -----------      DIET:Diet NPO      No Known Allergies      MEDICATION SIDE EFFECTS:none       SCHEDULED MEDS:                                 Scheduled Meds:   acetaZOLAMIDE  125 mg Oral Daily    sacubitril-valsartan  0.5 tablet Oral BID    metoprolol succinate  12.5 mg Oral Daily    amiodarone  200 mg Oral BID    torsemide  20 mg Oral Daily    cephALEXin  250 mg Oral 3 times per day    pantoprazole  40 mg Oral QAM AC    apixaban  5 mg Oral BID       Continuous Infusions:        Data:       Intake/Output Summary (Last 24 hours) at 6/13/2021 1039  Last data filed at 6/12/2021 1745  Gross per 24 hour   Intake 240 ml   Output --   Net 240 ml       Wt Readings from Last 3 Encounters:   06/12/21 83 lb (37.6 kg)   03/07/19 90 lb (40.8 kg)   12/13/16 93 lb (42.2 kg)       Labs: Additional    GLUCOSE:No results for input(s): POCGLU in the last 72 hours.     BNP:No results found for: BNP    CRP:   Recent Labs     06/11/21  1500 06/12/21  0510   CRP 2.8* 2.7*       ESR:  Recent Labs     06/11/21  1500 06/12/21  0510 06/13/21  0410   SEDRATE 28* 18 21*       RADIOLOGY: REVIEWED AVAILABLE REPORT  NM Cardiac Stress Test Nuclear Imaging   Final Result      1. Normal pharmacologic stress myocardial perfusion imaging without   evidence for ischemia. 2. Essentially normal global left ventricular segmental wall motion   with calculated gated SPECT left ventricular ejection fraction of 52%. Amauri Calderón D.O. [facility]                     CT CHEST WO CONTRAST   Final Result   1. Findings most suggestive of congestive heart failure with mild pulmonary   edema, small bilateral pleural effusions, and small pericardial effusion in   the setting of cardiomegaly. 2. Upper lobe predominant peripheral scarring with small calcification. Scattered pulmonary nodules measure up to an average of 6 mm in diameter. Please see follow-up recommendations below. 3. Dilated main pulmonary artery which can be seen in the setting of   pulmonary hypertension. 4. Coronary artery disease and aortic valvular calcification. RECOMMENDATIONS:   Multiple pulmonary nodules. Most severe: 6 mm solid pulmonary nodule. Recommend a non-contrast Chest CT at 3-6 months, then another non-contrast   Chest CT at 18-24 months. These guidelines do not apply to immunocompromised patients and patients with   cancer. Follow up in patients with significant comorbidities as clinically   warranted. For lung cancer screening, adhere to Lung-RADS guidelines. Reference: Radiology. 2017; 284(1):228-43. US DUP LOWER EXTREMITIES BILATERAL VENOUS   Final Result   No evidence of DVT in either lower extremity. XR CHEST PORTABLE   Final Result   Hazy opacities are most suspicious for pneumonia. Pulmonary edema would be   an alternative consideration.          CT ABDOMEN PELVIS W WO CONTRAST Additional Contrast? None    (Results Pending)             [address], DO  DO   10:39 AM     6/13/2021      Voice recognition software used for dictation

## 2021-06-14 LAB
AFP-TUMOR MARKER: 3 NG/ML (ref 0–9)
CA 19-9: 23 U/ML (ref 0–37)

## 2021-06-16 LAB
ALPHA-1 ANTITRYPSIN PHENOTYPE: ABNORMAL
ALPHA-1 ANTITRYPSIN: 205 MG/DL (ref 90–200)

## 2021-06-16 NOTE — DISCHARGE SUMMARY
DISCHARGE SUMMARY  Patient ID:  Garth Ireland  [id number]  68 y.o.  1945    Admit date: 6/7/2021      Discharge date : 6/13/2021      Admission Diagnoses: Atrial fibrillation with RVR ([facility] [I48.91]    Discharge Diagnosis  Principal Problem:    Atrial fibrillation with RVR ([facility]  Active Problems:    Pulmonary edema cardiac cause (HCC)    Hepatic congestion    Bilateral hearing loss    Moderate to severe mitral regurgitation    Systolic and diastolic CHF, acute on chronic (HCC)    COPD (chronic obstructive pulmonary disease) (HCC)    Pulmonary hypertension (HCC)    Bronchiectasis with acute exacerbation (HCC)    Interstitial pulmonary fibrosis (HCC)  Resolved Problems:    * No resolved hospital problems. *      Hospital Course:  CHIEF COMPLAINT: Short of breath, leg edema     History of Present Illness: 40-year-old female patient of Dr. Germania Holt am asked admit and follow. History obtained from patient as well as electronic record. Patient has noted for the last 3 weeks is becoming increasingly short of breath. Became unbearable day of admission. She is noticed increasing edema of legs for last 3 to 4 days. She has no previous cardiac history she is aware of. In the ED she was found to have atrial fibrillation with RVR rate of 150. Chest x-ray done in the ED revealed pulmonary edema. Since admission, she is feeling much better. In fact she \"wants to go home\". Extensive discussion with her regarding atrial fibrillation, potential for embolic CVA as well as pulmonary edema. If she left today she will likely be back within 24/48 hrs. with the same problems. IV diltiazem has slowed her heart rate; however when ID diltiazem was clamped heart rate increased again to 140. proBNP elevated 1410. Alkaline phosphatase elevated 198 ALT 45. The above seem likely due to hepatic congestion due to the congestive heart failure.   --Past medical history negative rheumatic fever scarlet fever polio diphtheria ventricular size.   Right ventricle global systolic function is mildly reduced .   TAPSE is not noted   Trace-mild aortic regurgitation is noted.   Increased E point septal separation noted,suggesting decreased LV cardiac   output.   Moderate to severe mitral regurgitation is present.   PISA radius >1.0-severe   Mild tricuspid regurgitation.  RVSP is 29 mmHg.   Physiologic and/or trace pulmonic regurgitation present.   Technically good quality study.   No comparison study available.   Suggest clinical correlation.     Cardiology consult from yesterday appreciated; amiodarone has been started; patient has finally agreed to taking apixaban by mouth.  note from yesterday; patient was given a coupon for 30-day supply of apixaban. Physical therapy and PAC score from today 20/24. Urine culture as follows--        Component Collected Lab   Organism Abnormal  06/07/2021  9:41 PM [facility]   Escherichia coli    Urine Culture, Routine 06/07/2021  9:41 PM  - [facility]   >100,000 CFU/ml   Sensitivity to follow          6/10/2021-patient sitting quietly in bedside chair; nasal cannula oxygen in place. No chest pain; still with mild dyspnea. Appetite fair. Still has significant peripheral edema, not much better since admission. 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 Afebrile last 24 hours. Pulse 100 blood pressure 108/71.  2 L nasal cannula 100% SPO2. Intake and output -1754 cc. Chloride 92 CO2 increased to 43 calcium slightly low 8.4. Alkaline phosphatase 176 ALT 33. CRP 2.0. Hemoglobin 10.6 WBC 8.2. ABGs from yesterday, done on 3 L nasal cannula PCO2 67.4 PO2 of 127.8 pH 7.366. Physical therapy and PAC score from yesterday 20/24.   Urine culture sensitivities not available as follows--     Narrative  Performed by: [address] [place] [address] Lab  Source: URINE       Site: Urine&Urine (20)             Susceptibility     Escherichia coli (1)             Antibiotic Interpretation CHARMAINE Status     ampicillin Sensitive <=^2 mcg/mL       ceFAZolin Sensitive <=^4 mcg/mL       cefepime Sensitive <=^0.12 mcg/mL       cefTRIAXone Sensitive <=^0.25 mcg/mL       Confirmatory Extended Spectrum Beta-Lactamase Negative Neg mcg/mL       ertapenem Sensitive <=^0.12 mcg/mL       gentamicin Sensitive <=^1 mcg/mL       levofloxacin Sensitive <=^0.12 mcg/mL       nitrofurantoin Sensitive <=^16 mcg/mL       piperacillin-tazobactam Sensitive <=^4 mcg/mL       trimethoprim-sulfamethoxazole Sensitive <=^20 mcg/mL             6/11/2021-sitting in bedside chair; nasal cannula oxygen in place. No dyspnea or chest pain at rest.  She just returned from cardiac stress test.  Since that she did work as a  for many years, she was exposed to significant secondhand smoke over those years. COPD discussed with her; also the need for pulmonary functions in patient or outpatient. Afebrile last 24 hours. Pulse rate still elevated 132. Blood pressure 105/76. SPO2 79 on room air. Prior to that 98% on 2 L nasal cannula. Intake and output -2264 cc. Weight has decreased from 93 pounds currently 80 pounds. Labs from today pending.          FINDINGS:   Mediastinum: The heart is enlarged.  Mild diffuse ectasia of the ascending   aorta to 3.8 cm without evidence of focal aneurysm.  Small pericardial   effusion.  The main pulmonary artery is enlarged to 3.6 cm, which can be seen   in the setting of pulmonary hypertension.  Coronary artery calcifications and   aortic valvular calcifications are noted.  The central airways are patent.      Lungs/pleura: Small bilateral pleural effusions.  Biapical calcification and   scarring.  Bilateral infrahilar ground-glass opacities are consistent with   the given history of pulmonary edema.  A few scattered pulmonary nodules   bilaterally measure up to 7 x 5 mm. Upper Abdomen: Limited images of the upper abdomen are without acute findings. Soft Tissues/Bones: No acute bony abnormality.  No axillary or   supraclavicular lymphadenopathy.        Impression:       1. Findings most suggestive of congestive heart failure with mild pulmonary   edema, small bilateral pleural effusions, and small pericardial effusion in   the setting of cardiomegaly. 2. Upper lobe predominant peripheral scarring with small calcification. Scattered pulmonary nodules measure up to an average of 6 mm in diameter. Please see follow-up recommendations below. 3. Dilated main pulmonary artery which can be seen in the setting of   pulmonary hypertension. 4. Coronary artery disease and aortic valvular calcification. RECOMMENDATIONS:   Multiple pulmonary nodules. Most severe: 6 mm solid pulmonary nodule. Recommend a non-contrast Chest CT at 3-6 months, then another non-contrast   Chest CT at 18-24 months. These guidelines do not apply to immunocompromised patients and patients with   cancer. Follow up in patients with significant comorbidities as clinically   warranted. For lung cancer screening, adhere to Lung-RADS guidelines. Reference: Radiology. 2017; 284(1):228-43.       Pulmonary consult from yesterday appreciated. Previous CT of chest revealed emphysematous changes bilaterally. Hypercapnia due to metabolic alkalosis and respiratory compensation and primary respiratory acidosis with a normal pH. Cardiology note from yesterday; patient still does not understand her heart issues. Hopefully will convert to sinus rhythm; reassess left ventricular function in 3 months from time of conversion. Obtain nuclear stress test to rule out ischemic coronary disease.  note from today, patient does not use oxygen at home, [address] [place] [address] DME following.   Declines home health care.     6/12/2021-patient chronic ([facility] 06/08/2021       Past Surgical Hx :  Past Surgical History:   Procedure Laterality Date    FRACTURE SURGERY  11/24/215    right hip       Labs:   CBC:   Lab Results   Component Value Date    WBC 6.6 06/13/2021    RBC 3.51 06/13/2021    HGB 10.2 06/13/2021    HCT 36.1 06/13/2021    .8 06/13/2021    MCH 29.1 06/13/2021    MCHC 28.3 06/13/2021    RDW 14.6 06/13/2021     06/13/2021    MPV 9.7 06/13/2021     CBC with Differential:    Lab Results   Component Value Date    WBC 6.6 06/13/2021    RBC 3.51 06/13/2021    HGB 10.2 06/13/2021    HCT 36.1 06/13/2021     06/13/2021    .8 06/13/2021    MCH 29.1 06/13/2021    MCHC 28.3 06/13/2021    RDW 14.6 06/13/2021    NRBC 0.0 06/10/2021    LYMPHOPCT 8.0 06/13/2021    MONOPCT 10.7 06/13/2021    BASOPCT 0.3 06/13/2021    MONOSABS 0.71 06/13/2021    LYMPHSABS 0.53 06/13/2021    EOSABS 0.23 06/13/2021    BASOSABS 0.02 06/13/2021     Hemoglobin/Hematocrit:    Lab Results   Component Value Date    HGB 10.2 06/13/2021    HCT 36.1 06/13/2021     CMP:    Lab Results   Component Value Date     06/13/2021    K 3.3 06/13/2021    K 5.2 06/07/2021    CL 93 06/13/2021    CO2 46 06/13/2021    BUN 28 06/13/2021    CREATININE 0.9 06/13/2021    GFRAA >60 06/13/2021    LABGLOM >60 06/13/2021    GLUCOSE 104 06/13/2021    PROT 6.6 06/13/2021    LABALBU 3.2 06/13/2021    CALCIUM 9.0 06/13/2021    BILITOT 0.2 06/13/2021    ALKPHOS 133 06/13/2021    AST 14 06/13/2021    ALT 19 06/13/2021     BMP:    Lab Results   Component Value Date     06/13/2021    K 3.3 06/13/2021    K 5.2 06/07/2021    CL 93 06/13/2021    CO2 46 06/13/2021    BUN 28 06/13/2021    LABALBU 3.2 06/13/2021    CREATININE 0.9 06/13/2021    CALCIUM 9.0 06/13/2021    GFRAA >60 06/13/2021    LABGLOM >60 06/13/2021    GLUCOSE 104 06/13/2021     Hepatic Function Panel:    Lab Results   Component Value Date    ALKPHOS 133 06/13/2021    ALT 19 06/13/2021    AST 14 06/13/2021    PROT 6.6 06/13/2021    BILITOT 0.2 06/13/2021    BILIDIR <0.2 06/13/2021    IBILI see below 06/13/2021    LABALBU 3.2 06/13/2021     Ionized Calcium:  No results found for: IONCA  Magnesium:    Lab Results   Component Value Date    MG 1.9 06/13/2021     Phosphorus:    Lab Results   Component Value Date    PHOS 2.4 06/13/2021     LDH:  No results found for: LDH  Uric Acid:    Lab Results   Component Value Date    LABURIC 4.5 06/08/2021     PT/INR:    Lab Results   Component Value Date    PROTIME 10.9 12/13/2016    INR 1.0 12/13/2016     Warfarin PT/INR:  No components found for: Abigail Baker  PTT:    Lab Results   Component Value Date    APTT 26.8 12/13/2016   [APTT}  Troponin:  No results found for: TROPONINI  Last 3 Troponin:  No results found for: TROPONINI  U/A:    Lab Results   Component Value Date    COLORU Yellow 11/23/2015    PROTEINU Negative 11/23/2015    PHUR 6.0 11/23/2015    WBCUA 5-10 11/23/2015    RBCUA 0-1 11/23/2015    BACTERIA MANY 11/23/2015    CLARITYU Clear 11/23/2015    SPECGRAV 1.025 11/23/2015    LEUKOCYTESUR Negative 11/23/2015    UROBILINOGEN 0.2 11/23/2015    BILIRUBINUR Negative 11/23/2015    BLOODU MODERATE 11/23/2015    GLUCOSEU Negative 11/23/2015     ABG:    Lab Results   Component Value Date    PH 7.366 06/09/2021    PCO2 67.4 06/09/2021    PO2 127.8 06/09/2021    HCO3 37.7 06/09/2021    BE 10.2 06/09/2021    O2SAT 98.7 06/09/2021     HgBA1c:    Lab Results   Component Value Date    LABA1C 5.6 06/08/2021     FLP:    Lab Results   Component Value Date    TRIG 74 06/08/2021    HDL 44 06/08/2021    LDLCALC 61 06/08/2021    LABVLDL 15 06/08/2021     TSH:    Lab Results   Component Value Date    TSH 2.400 06/08/2021     VITAMIN B12: No components found for: B12  FOLATE:    Lab Results   Component Value Date    FOLATE 20.0 06/08/2021     IRON:    Lab Results   Component Value Date    IRON 38 06/08/2021     Iron Saturation:  No components found for: PERCENTFE  TIBC:    Lab Results   Component Value Date    TIBC 251 06/08/2021     FERRITIN:    Lab Results   Component Value Date    FERRITIN 82 06/08/2021          CBC:No results for input(s): WBC, RBC, HGB, HCT, PLT, MCV, MCH, MCHC, RDW, NRBC, SEGSPCT, BANDSPCT, BLASTSPCT, METASPCT, LYMPHOPCT, PROMYELOPCT, MONOPCT, MYELOPCT, EOSPCT, BASOPCT, MONOSABS, LYMPHSABS, EOSABS, BASOSABS, DIFFTYPE in the last 72 hours. Invalid input(s): ATYLMREL       H & H :No results for input(s): HGB in the last 72 hours. TSH:No results for input(s): TSH in the last 72 hours. GLUCOSE:No results for input(s): POCGLU in the last 72 hours. CMP:No results for input(s): NA, K, CL, CO2, BUN, CREATININE, GFRAA, AGRATIO, LABGLOM, GLUCOSE, PROT, LABALBU, CALCIUM, BILITOT, ALKPHOS, AST, ALT in the last 72 hours. Invalid input(s): GLU      BNP:No results found for: BNP    PROTIME/INR:No results for input(s): PROTIME, INR in the last 72 hours. CRP: No results for input(s): CRP in the last 72 hours. ESR:No results for input(s): SEDRATE in the last 72 hours. LIPASE , AMYLASE:  No results found for: LIPASE   No results found for: AMYLASE    ABGs: No results found for: PHART, PO2ART, NSF3KQQ    CARDIAC: No results for input(s): CKTOTAL, CKMB, CKMBINDEX, TROPONINI in the last 72 hours.     Lipid Profile:   Lab Results   Component Value Date    TRIG 74 06/08/2021    HDL 44 06/08/2021    LDLCALC 61 06/08/2021    CHOL 120 06/08/2021        Echo Complete    Result Date: 6/10/2021  Transthoracic Echocardiography Report (TTE)  Demographics   Patient Name      Angela Seen Gender              Female                    S   Medical Record    [id number]         Room Number         0423  Number   Account #         [de-identified]        Procedure Date      06/08/2021   Corporate ID                       Ordering Physician  Lu Hidalgo   Accession Number  [id number]       Referring Physician Jen Liriano   Date of Birth     1945       Sonographer         Maida Has dimension within normal limits. Ascending aorta appears mildly sclerotic and/or calcified. Miscellaneous  Normal Inferior Vena Cava diameter and respiratory variation. Conclusions   Summary  Left ventricle grossly normal in size. Global hypokinesis is noted to be severe. Estimated left ventricular ejection fraction is 28±5%. Normal left ventricular wall thickness. Diastolic function cannot be accurately assessed due to significant mitral  regurgitation. The left atrium is moderately dilated. The LAESV Index is >34 ml/m2. Normal right ventricular size. Right ventricle global systolic function is mildly reduced . TAPSE is not noted  Trace-mild aortic regurgitation is noted. Increased E point septal separation noted,suggesting decreased LV cardiac  output. Moderate to severe mitral regurgitation is present. PISA radius >1.0-severe  Mild tricuspid regurgitation. RVSP is 29 mmHg. Physiologic and/or trace pulmonic regurgitation present. Moderate-large left pleural effusion. Technically good quality study. No comparison study available. Suggest clinical correlation.    Signature   ----------------------------------------------------------------  Electronically signed by Noé Aceves DO(Interpreting  physician) on 06/10/2021 11:56 AM  ----------------------------------------------------------------  M-Mode/2D Measurements & Calculations   LV Diastolic    LV Systolic Dimension: 4 cm  AV Cusp Separation: 1.7 cmLA  Dimension: 4.4  LV Volume Diastolic: 15.6 ml Dimension: 3.4 cmAO Root  cm              LV Volume Systolic: 94.9 ml  Dimension: 2.9 cm  LV FS:9.1 %     LV EDV/LV EDV Index: 88.3  LV PW           ml/64 ml/m^2LV ESV/LV ESV  Diastolic: 0.8  Index: 61.3 ml/49ml/ m^2  cm              EF Calculated: 22.2 %        RV Diastolic Dimension: 2.3  LV PW Systolic: LV Mass Index: 85 l/min*m^2  cm  0.9 cm          LV Length: 7 cm  Septum                                       LA/Aorta: 2.15  Diastolic: 0.9 thickening of gastric folds. This may be related to gastritis. Neoplasia not entirely excluded. Correlation with upper endoscopy recommended when clinically feasible. Result Date: 6/13/2021  EXAMINATION: CT OF THE ABDOMEN AND PELVIS WITH AND WITHOUT CONTRAST 6/12/2021 5:01 pm TECHNIQUE: CT of the abdomen and pelvis was performed with and without the administration of intravenous contrast.  Multiplanar reformatted images are provided for review. Dose modulation, iterative reconstruction, and/or weight based adjustment of the mA/kV was utilized to reduce the radiation dose to as low as reasonably achievable. COMPARISON: None. HISTORY: ORDERING SYSTEM PROVIDED HISTORY: Marked elevation of ; rule out ovarian malignancy TECHNOLOGIST PROVIDED HISTORY: Reason for exam:->Marked elevation of ; rule out ovarian malignancy Additional Contrast?->None FINDINGS: The liver is slightly heterogeneous but without a definite focal mass. In the left lobe on series 4, image 42 is a 6 mm focus of increased attenuation which may be incidental but a hypervascular lesion is not excluded. Gallbladder is unremarkable. Liver is homogeneous. Mild intrahepatic and extrahepatic biliary ductal dilatation. The common duct measures 8 mm which is mildly prominent for the patient's age. The pancreatic duct measures 3 mm. No obvious obstructing mass or stone. Thickening of the adrenal glands, likely due to hyperplasia. No hydronephrosis. The abdominal aorta is diffusely ectatic and partially calcified. No evidence of dissection. Assessment of bowel is limited by absence of oral contrast and relative lack of intra-fat. Relatively large stool burden is seen diffusely. The cecum extends deep into the pelvis. Probably normal appendix. Prominent diffuse thickening of gastric folds. Moderate diverticulosis without evidence of acute diverticulitis. No obvious free air. No free fluid or abscess.  Urinary bladder is unremarkable. Uterus is unremarkable for CT appearance. Ovaries are not definitively identified. No obvious adnexal mass. Partial visualization of small left pleural effusion similar to recent chest CT dated 06/10/2021 with adjacent atelectasis. Moderate cardiomegaly. Degenerative changes are present in the spine and pelvis. Old-appearing fracture of the left inferior pubic ramus. Streak artifact due to fixation hardware in the proximal right femur. Large diffuse stool burden. 6 mm focus of increased attenuation in the left hepatic lobe may be incidental but hypervascular lesion not excluded. Follow-up in 3-4 months recommended. Diverticulosis. No change in the appearance of the base of the chest compared with recent chest CT. Other chronic appearing findings. CT CHEST WO CONTRAST    Result Date: 6/12/2021  EXAMINATION: CT OF THE CHEST WITHOUT CONTRAST 6/10/2021 6:28 pm TECHNIQUE: CT of the chest was performed without the administration of intravenous contrast. Multiplanar reformatted images are provided for review. Dose modulation, iterative reconstruction, and/or weight based adjustment of the mA/kV was utilized to reduce the radiation dose to as low as reasonably achievable. COMPARISON: None. HISTORY: ORDERING SYSTEM PROVIDED HISTORY: Pulmonary edema, rule out mass, COPD TECHNOLOGIST PROVIDED HISTORY: Reason for exam:->Pulmonary edema, rule out mass, COPD FINDINGS: Mediastinum: The heart is enlarged. Mild diffuse ectasia of the ascending aorta to 3.8 cm without evidence of focal aneurysm. Small pericardial effusion. The main pulmonary artery is enlarged to 3.6 cm, which can be seen in the setting of pulmonary hypertension. Coronary artery calcifications and aortic valvular calcifications are noted. The central airways are patent. Lungs/pleura: Small bilateral pleural effusions. Biapical calcification and scarring.   Bilateral infrahilar ground-glass opacities are consistent with the given EKG leads overlie the chest.     Hazy opacities are most suspicious for pneumonia. Pulmonary edema would be an alternative consideration. NM Cardiac Stress Test Nuclear Imaging    Result Date: 6/12/2021  Indication:  Chest discomfort Clinical History:   Patient has no previous historyof coronary artery disease. IMAGING: Myocardial perfusion imaging was performed at rest 30-35 minutes following the intravenous injection of 10.3 mCi of (Tc-Sestamibi) followed by 10 ml of Normal Saline. At peak exercise, the patient was injected intravenously with 30. 2mCi of (Tc-Sestamibi) followed by 10 ml of Normal Saline. Gated post-stress tomographic imaging was performed 20-25 minutes after stress. FINDINGS: The overall quality of the study was good. Left ventricular cavity size was noted to be normal. EDV 46 mL Rotational analog analysis demonstrated significant artifact secondary to marked increase in gastrointestinal uptake. The gated SPECT stress imaging in the short, vertical long, and horizontal long axes demonstrate normal homogeneous tracer distribution throughout the myocardium. Likewise there is normal brightening and thickening of all myocardial segments. Gated SPECT left ventricular ejection fraction was calculated to be 52%, with essentially normal myocardial segmental wall motion. 1. Normal pharmacologic stress myocardial perfusion imaging without evidence for ischemia. 2. Essentially normal global left ventricular segmental wall motion with calculated gated SPECT left ventricular ejection fraction of 52%. Nuria Calderón D.O. [facility]     US DUP LOWER EXTREMITIES BILATERAL VENOUS    Result Date: 6/7/2021  EXAMINATION: DUPLEX VENOUS ULTRASOUND OF THE BILATERAL LOWER EXTREMITIES, 6/7/2021 7:06 pm TECHNIQUE: Duplex ultrasound using B-mode/gray scaled imaging and Doppler spectral analysis and color flow was obtained of the bilateral lower extremities. COMPARISON: None used.  HISTORY: ORDERING SYSTEM PROVIDED HISTORY: Leg edema rule out DVT TECHNOLOGIST PROVIDED HISTORY: Reason for exam:->Leg edema rule out DVT What reading provider will be dictating this exam?->CRC FINDINGS: The visualized veins of the bilateral lower extremities are patent and free of echogenic thrombus. The veins demonstrate good compressibility with normal color flow study and spectral analysis. Bilateral soft tissue swelling. No evidence of DVT in either lower extremity.        Discharge Exam:  See progress note from today    Discharge Medication List as of 6/13/2021  3:09 PM      START taking these medications    Details   amiodarone (CORDARONE) 200 MG tablet Take 1 tablet by mouth 2 times daily, Disp-60 tablet, R-1Normal      apixaban (ELIQUIS) 5 MG TABS tablet Take 1 tablet by mouth 2 times daily, Disp-60 tablet, R-1Normal      metoprolol succinate (TOPROL XL) 25 MG extended release tablet Take 0.5 tablets by mouth daily, Disp-30 tablet, R-3Normal      sacubitril-valsartan (ENTRESTO) 24-26 MG per tablet Take 0.5 tablets by mouth 2 times daily, Disp-60 tablet, R-1Normal      acetaZOLAMIDE (DIAMOX) 250 MG tablet Take 1 tablet by mouth daily, Disp-90 tablet, R-3Normal      torsemide (DEMADEX) 20 MG tablet Take 1 tablet by mouth daily, Disp-30 tablet, R-3Normal      potassium & sodium phosphates (PHOS-NAK) 280-160-250 MG PACK Take 1 packet by mouth 2 times daily, Disp-8 packet, R-0Normal         CONTINUE these medications which have NOT CHANGED    Details   aspirin (EVER ASPIRIN) 325 MG tablet Take 325 mg by mouth as needed for PainHistorical Med      Acetaminophen (APAP) 325 MG TABS Take 325 mg by mouth as needed for PainHistorical Med             Time Spent on discharge is more than 30 minutes --      TIME  INCLUDES TIME THAT WAS  SPENT WITH DISCHARGE PAPERS, MEDICATION REVIEW, MEDICATION RECONCILIATION,   PRESCRIPTIONS, CHART REVIEW, PATIENT EXAM , FINAL PROGRESS NOTE, DISCUSSION OF FINDINGS WITH PATIENT AND AVAILABLE FAMILY , AND DICTATION  WHERE NEEDED ; Home Health Care [facility]) FORMS COMPLETED ; N-17  COMPLETION ;  H&P UPDATED ; DURABLE EQUIPMENT FORMS.      Active Hospital Problems    Diagnosis     Bronchiectasis with acute exacerbation (HCC) [J47.1]     Interstitial pulmonary fibrosis ([address] [J84.10]     Pulmonary hypertension ([address] [I27.20]     COPD (chronic obstructive pulmonary disease) ([address] [J44.9]     Moderate to severe mitral regurgitation [Q36.6]     Systolic and diastolic CHF, acute on chronic (HCC) [I50.43]     Bilateral hearing loss [H91.93]     Atrial fibrillation with RVR ([address] [I48.91]     Pulmonary edema cardiac cause ([address] [I50.1]     Hepatic congestion [K76.1]        Signed:    [id number], DO  DO    6/16/2021    12:07 PM    Voice recognition software use for dictation

## 2021-07-20 ENCOUNTER — OFFICE VISIT (OUTPATIENT)
Dept: ENT CLINIC | Age: 76
End: 2021-07-20
Payer: MEDICARE

## 2021-07-20 ENCOUNTER — PROCEDURE VISIT (OUTPATIENT)
Dept: AUDIOLOGY | Age: 76
End: 2021-07-20
Payer: MEDICARE

## 2021-07-20 VITALS — WEIGHT: 100 LBS | HEIGHT: 67 IN | BODY MASS INDEX: 15.7 KG/M2

## 2021-07-20 DIAGNOSIS — H90.6 MIXED CONDUCTIVE AND SENSORINEURAL HEARING LOSS OF BOTH EARS: Primary | ICD-10-CM

## 2021-07-20 PROCEDURE — 92567 TYMPANOMETRY: CPT | Performed by: AUDIOLOGIST

## 2021-07-20 PROCEDURE — 92557 COMPREHENSIVE HEARING TEST: CPT | Performed by: AUDIOLOGIST

## 2021-07-20 PROCEDURE — 99204 OFFICE O/P NEW MOD 45 MIN: CPT | Performed by: OTOLARYNGOLOGY

## 2021-07-20 ASSESSMENT — ENCOUNTER SYMPTOMS
ALLERGIC/IMMUNOLOGIC NEGATIVE: 1
RESPIRATORY NEGATIVE: 1
EYES NEGATIVE: 1

## 2021-07-20 NOTE — PROGRESS NOTES
Subjective:     Patient ID:  Jonny Gan is a 68 y.o. female. HPI:    Hearing Loss  Patient presents today with complaints of hearingloss. Concern regarding hearing has been present for 2 years. She has not failed a prior hearing test.The patient reports talking loudly, difficulty hearing. Tinnitus  Patient does not have tinnitus. Patient does not have hearing aids at this time. 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 Review of Systems   Constitutional: Negative. HENT: Positive for hearing loss. Eyes: Negative. Respiratory: Negative. Skin: Negative. Allergic/Immunologic: Negative. Neurological: Negative. Hematological: Negative. Psychiatric/Behavioral: Negative. All other systems reviewed and are negative. Objective:   Physical Exam  Vitals reviewed. Constitutional:       Appearance: Normal appearance. HENT:      Head: Normocephalic. Right Ear: Tympanic membrane, ear canal and external ear normal.      Left Ear: Tympanic membrane, ear canal and external ear normal.      Nose: Nose normal.      Mouth/Throat:      Mouth: Mucous membranes are moist.   Eyes:      Conjunctiva/sclera: Conjunctivae normal.   Cardiovascular:      Rate and Rhythm: Normal rate. Pulses: Normal pulses. Pulmonary:      Effort: Pulmonary effort is normal.   Musculoskeletal:      Cervical back: Normal range of motion and neck supple. Lymphadenopathy:      Cervical: No cervical adenopathy.    Skin:     Capillary Refill: Capillary refill takes less than 2 seconds. Neurological:      Mental Status: She is alert and oriented to person, place, and time. Assessment:       Diagnosis Orders   1. Mixed conductive and sensorineural hearing loss of both ears                Plan:      B/L moderate to severe hearing loss  Pt is  a candidate for hearing aids and is not interested in discussing this with audiology. Also discussed the importance of hearing protection from now on to preserve remaining hearing. Call or return to clinic prn if these symptoms worsen or fail to improve as anticipated    Follow up in 1 year        Ena Ramos  1945    I have discussed the case, including pertinent history and exam findings with the resident. I have seen and examined the patient and the key elements of the encounter have been performed by me. I agree with the assessment, plan and orders as documented by the  resident              Remainder of medical problems as per  resident note. Patient seen and examined. Agree with above exam, assessment and plan.       Electronically signed by Cas Riggs DO on 7/26/21 at 10:36 AM EDT

## 2021-07-20 NOTE — PROCEDURES
[address] [address]                               PULMONARY FUNCTION    PATIENT NAME: Elba Rm                 :        1945  MED REC NO:   [id number]                            ROOM:       0423  ACCOUNT NO:   [de-identified]                           ADMIT DATE: 2021  PROVIDER:     Rachel Hackett MD    DATE OF PROCEDURE:  2021    INTERPRETATION:  FVC and FEV1 are severely reduced. FEV1/FVC ratio is  slightly reduced. There is no change in flow rates after the acute  administration of bronchodilators. Lung volumes are normal after  bronchodilators and the patient was unable to do a diffusion capacity. IMPRESSION:  Severe restrictive ventilatory defect. There are some  question on the patient's ability to take a deep breath. Diffusion  capacity was not measured. Clinical correlation is advised, however,  given the variabilities seen here, caution is indicated in clinical  significance.         Vonda Andrew MD    D: 2021 22:27:18       T: 2021 4:16:09     NP/[email or web address]  Job#: [id number]     Doc#: [id number]

## 2021-07-20 NOTE — PROGRESS NOTES
This patient was referred for audiometric/tympanometric testing by Dr. Kim Gutierrez due to hearing loss. Audiometry revealed a moderate-to-profound mixed hearing loss in the left ear and moderately-severe-to-profound mixed hearing loss in the right ear. Reliability was good. Speech reception thresholds were in good agreement with the pure tone averages, bilaterally. Speech discrimination scores were excellent, in the left ear, and good in the right ear. Asymmetrical results obtained: Right ear worse. Tympanometry revealed negative middle ear pressure (-215 daPa), in the right ear and negative middle ear pressure (-210 daPa) in the left ear. The results were reviewed with the patient and patient's daughter. Recommendations for follow up will be made pending physician consult.     Dudley Méndez AuD

## 2021-12-21 ENCOUNTER — HOSPITAL ENCOUNTER (EMERGENCY)
Age: 76
Discharge: HOME | End: 2021-12-21
Attending: EMERGENCY MEDICINE
Payer: MEDICARE

## 2021-12-21 ENCOUNTER — APPOINTMENT (OUTPATIENT)
Dept: GENERAL RADIOLOGY | Age: 76
End: 2021-12-21
Payer: MEDICARE

## 2021-12-21 VITALS
HEART RATE: 102 BPM | OXYGEN SATURATION: 97 % | RESPIRATION RATE: 16 BRPM | BODY MASS INDEX: 14.44 KG/M2 | WEIGHT: 92 LBS | SYSTOLIC BLOOD PRESSURE: 131 MMHG | TEMPERATURE: 98.4 F | DIASTOLIC BLOOD PRESSURE: 85 MMHG | HEIGHT: 67 IN

## 2021-12-21 DIAGNOSIS — I48.20 CHRONIC ATRIAL FIBRILLATION (HCC): Primary | ICD-10-CM

## 2021-12-21 LAB
ALBUMIN SERPL-MCNC: 3.2 G/DL (ref 3.5–5.2)
ALP BLD-CCNC: 155 U/L (ref 35–104)
ALT SERPL-CCNC: 19 U/L (ref 0–32)
ANION GAP SERPL CALCULATED.3IONS-SCNC: 14 MMOL/L (ref 7–16)
AST SERPL-CCNC: 30 U/L (ref 0–31)
BASOPHILS ABSOLUTE: 0.01 E9/L (ref 0–0.2)
BASOPHILS RELATIVE PERCENT: 0.2 % (ref 0–2)
BILIRUB SERPL-MCNC: 0.3 MG/DL (ref 0–1.2)
BUN BLDV-MCNC: 17 MG/DL (ref 6–23)
CALCIUM SERPL-MCNC: 9 MG/DL (ref 8.6–10.2)
CHLORIDE BLD-SCNC: 96 MMOL/L (ref 98–107)
CO2: 31 MMOL/L (ref 22–29)
CREAT SERPL-MCNC: 0.7 MG/DL (ref 0.5–1)
EOSINOPHILS ABSOLUTE: 0.03 E9/L (ref 0.05–0.5)
EOSINOPHILS RELATIVE PERCENT: 0.5 % (ref 0–6)
GFR AFRICAN AMERICAN: >60
GFR NON-AFRICAN AMERICAN: >60 ML/MIN/1.73
GLUCOSE BLD-MCNC: 92 MG/DL (ref 74–99)
HCT VFR BLD CALC: 33.2 % (ref 34–48)
HEMOGLOBIN: 9.7 G/DL (ref 11.5–15.5)
IMMATURE GRANULOCYTES #: 0.06 E9/L
IMMATURE GRANULOCYTES %: 0.9 % (ref 0–5)
LYMPHOCYTES ABSOLUTE: 0.81 E9/L (ref 1.5–4)
LYMPHOCYTES RELATIVE PERCENT: 12.7 % (ref 20–42)
MCH RBC QN AUTO: 25.5 PG (ref 26–35)
MCHC RBC AUTO-ENTMCNC: 29.2 % (ref 32–34.5)
MCV RBC AUTO: 87.4 FL (ref 80–99.9)
MONOCYTES ABSOLUTE: 0.33 E9/L (ref 0.1–0.95)
MONOCYTES RELATIVE PERCENT: 5.2 % (ref 2–12)
NEUTROPHILS ABSOLUTE: 5.15 E9/L (ref 1.8–7.3)
NEUTROPHILS RELATIVE PERCENT: 80.5 % (ref 43–80)
PDW BLD-RTO: 15.9 FL (ref 11.5–15)
PLATELET # BLD: 449 E9/L (ref 130–450)
PMV BLD AUTO: 8.8 FL (ref 7–12)
POTASSIUM REFLEX MAGNESIUM: 3.7 MMOL/L (ref 3.5–5)
PRO-BNP: 1993 PG/ML (ref 0–450)
RBC # BLD: 3.8 E12/L (ref 3.5–5.5)
SODIUM BLD-SCNC: 141 MMOL/L (ref 132–146)
TOTAL PROTEIN: 7.6 G/DL (ref 6.4–8.3)
TROPONIN, HIGH SENSITIVITY: 13 NG/L (ref 0–9)
TROPONIN, HIGH SENSITIVITY: 15 NG/L (ref 0–9)
WBC # BLD: 6.4 E9/L (ref 4.5–11.5)

## 2021-12-21 PROCEDURE — 84484 ASSAY OF TROPONIN QUANT: CPT

## 2021-12-21 PROCEDURE — 99284 EMERGENCY DEPT VISIT MOD MDM: CPT

## 2021-12-21 PROCEDURE — 80053 COMPREHEN METABOLIC PANEL: CPT

## 2021-12-21 PROCEDURE — 83880 ASSAY OF NATRIURETIC PEPTIDE: CPT

## 2021-12-21 PROCEDURE — 2580000003 HC RX 258: Performed by: STUDENT IN AN ORGANIZED HEALTH CARE EDUCATION/TRAINING PROGRAM

## 2021-12-21 PROCEDURE — 93005 ELECTROCARDIOGRAM TRACING: CPT | Performed by: STUDENT IN AN ORGANIZED HEALTH CARE EDUCATION/TRAINING PROGRAM

## 2021-12-21 PROCEDURE — 71045 X-RAY EXAM CHEST 1 VIEW: CPT

## 2021-12-21 PROCEDURE — 85025 COMPLETE CBC W/AUTO DIFF WBC: CPT

## 2021-12-21 PROCEDURE — 93005 ELECTROCARDIOGRAM TRACING: CPT | Performed by: PHYSICIAN ASSISTANT

## 2021-12-21 RX ORDER — 0.9 % SODIUM CHLORIDE 0.9 %
500 INTRAVENOUS SOLUTION INTRAVENOUS ONCE
Status: COMPLETED | OUTPATIENT
Start: 2021-12-21 | End: 2021-12-21

## 2021-12-21 RX ADMIN — SODIUM CHLORIDE 500 ML: 9 INJECTION, SOLUTION INTRAVENOUS at 17:08

## 2021-12-21 ASSESSMENT — ENCOUNTER SYMPTOMS
WHEEZING: 0
SORE THROAT: 0
EYE REDNESS: 0
ABDOMINAL DISTENTION: 0
DIARRHEA: 0
SINUS PRESSURE: 0
BACK PAIN: 0
EYE PAIN: 0
SHORTNESS OF BREATH: 1
VOMITING: 0
EYE DISCHARGE: 0
COUGH: 0
NAUSEA: 0

## 2021-12-21 NOTE — ED PROVIDER NOTES
Patient presents with palpitations and fatigue. Patient states that this is been ongoing for the past couple of days. She states that the palpitations feel as if her heart is racing. She does have a history of A. fib but is not usually in it. Her daughter states that she is on amiodarone, metoprolol, and Eliquis for her A. fib. She states that she had recently stopped taking the amiodarone prior to her symptoms. Her daughter gave her a dose of amiodarone last night hoping that it would help her symptoms. Patient also endorses some lightheadedness, chest discomfort, shortness of breath, and left thumb numbness. She denies any syncope, nausea, vomiting, abdominal pain, or lower extremity swelling or pain. The history is provided by the patient. No  was used. Review of Systems   Constitutional: Positive for fatigue. Negative for chills and fever. HENT: Negative for ear pain, sinus pressure and sore throat. Eyes: Negative for pain, discharge and redness. Respiratory: Positive for shortness of breath. Negative for cough and wheezing. Cardiovascular: Positive for chest pain and palpitations. Gastrointestinal: Negative for abdominal distention, diarrhea, nausea and vomiting. Genitourinary: Negative for dysuria and frequency. Musculoskeletal: Negative for arthralgias and back pain. Skin: Negative for rash and wound. Neurological: Positive for light-headedness. Negative for weakness and headaches. Hematological: Negative for adenopathy. All other systems reviewed and are negative. Physical Exam  Vitals and nursing note reviewed. Constitutional:       General: She is not in acute distress. Appearance: She is well-developed. HENT:      Head: Normocephalic and atraumatic. Eyes:      Conjunctiva/sclera: Conjunctivae normal.   Cardiovascular:      Rate and Rhythm: Tachycardia present. Rhythm irregular. Heart sounds: Normal heart sounds.  No murmur heard. Pulmonary:      Effort: Pulmonary effort is normal. No respiratory distress. Breath sounds: Normal breath sounds. No wheezing or rales. Abdominal:      General: Bowel sounds are normal.      Palpations: Abdomen is soft. Tenderness: There is no abdominal tenderness. There is no guarding or rebound. Musculoskeletal:      Cervical back: Normal range of motion and neck supple. Skin:     General: Skin is warm and dry. Neurological:      Mental Status: She is alert and oriented to person, place, and time. Cranial Nerves: No cranial nerve deficit. Motor: No weakness. Coordination: Coordination normal.          Procedures     MDM  Number of Diagnoses or Management Options  Chronic atrial fibrillation ([facility] [address]  Diagnosis management comments: Patient presents with fatigue and tachycardia. Patient does have an irregular heartbeat at this time. She is already anticoagulated with Eliquis. She is tachycardic. Fluids were given. CBC showed patient's baseline anemia but was otherwise unremarkable. CMP is unremarkable. Initial troponin was 15. Repeat was 13. EKG showed a fib. BNP was elevated at 1993. Chest x-ray-no acute cardiopulmonary processes. Patient is already anticoagulated. She is rate controlled at this time. She is already prescribed amiodarone but has not been taking it. Shared decision making with patient and daughter who would like to be discharged home and follow with their cardiologist tomorrow. Patient was advised to take her amiodarone as prescribed and to follow with her cardiologist first thing in the morning. She was also given strict return precautions. Patient discharged home.           Amount and/or Complexity of Data Reviewed  Clinical lab tests: reviewed  Tests in the radiology section of CPT®: reviewed  Tests in the medicine section of CPT®: reviewed  Decide to obtain previous medical records or to obtain history from someone other than the patient: yes         ED Course as of 12/23/21 0211   Tue Dec 21, 2021   2153 Shared decision making with patient and daughter who decided that they would like to be admitted to follow-up with cardiology in the outpatient setting. Patient is currently rate controlled. She does have amiodarone at home that she is supposed to be taking and will resume taking that medication. In addition she is already anticoagulated with Eliquis. Patient discharged home with instructions to follow-up with her cardiologist tomorrow morning. [BB]      ED Course User Index  [BB] Yessenia Tian DO          ED Course as of 12/23/21 0211   Tue Dec 21, 2021   2153 Shared decision making with patient and daughter who decided that they would like to be admitted to follow-up with cardiology in the outpatient setting. Patient is currently rate controlled. She does have amiodarone at home that she is supposed to be taking and will resume taking that medication. In addition she is already anticoagulated with Eliquis. Patient discharged home with instructions to follow-up with her cardiologist tomorrow morning. [BB]      ED Course User Index  [BB] Yessenia Tian DO       --------------------------------------------- PAST HISTORY ---------------------------------------------  Past Medical History:  has a past medical history of Atrial fibrillation with RVR ([address] Bilateral hearing loss, Bronchiectasis with acute exacerbation ([address] COPD (chronic obstructive pulmonary disease) ([address] Hepatic congestion, Interstitial pulmonary fibrosis ([address] Moderate to severe mitral regurgitation, Pulmonary edema cardiac cause ([address] Pulmonary hypertension ([address] and Systolic and diastolic CHF, acute on chronic ([address] Past Surgical History:  has a past surgical history that includes fracture surgery (11/24/215). Social History:  reports that she has never smoked.  She has never used smokeless tobacco. She reports that she does not drink alcohol and does not use drugs. Family History: family history includes Heart Attack in her mother; Other in her father. The patients home medications have been reviewed. Allergies: Patient has no known allergies.     -------------------------------------------------- RESULTS -------------------------------------------------  Labs:  Results for orders placed or performed during the hospital encounter of 12/21/21   CBC Auto Differential   Result Value Ref Range    WBC 6.4 4.5 - 11.5 E9/L    RBC 3.80 3.50 - 5.50 E12/L    Hemoglobin 9.7 (L) 11.5 - 15.5 g/dL    Hematocrit 33.2 (L) 34.0 - 48.0 %    MCV 87.4 80.0 - 99.9 fL    MCH 25.5 (L) 26.0 - 35.0 pg    MCHC 29.2 (L) 32.0 - 34.5 %    RDW 15.9 (H) 11.5 - 15.0 fL    Platelets 137 380 - 396 E9/L    MPV 8.8 7.0 - 12.0 fL    Neutrophils % 80.5 (H) 43.0 - 80.0 %    Immature Granulocytes % 0.9 0.0 - 5.0 %    Lymphocytes % 12.7 (L) 20.0 - 42.0 %    Monocytes % 5.2 2.0 - 12.0 %    Eosinophils % 0.5 0.0 - 6.0 %    Basophils % 0.2 0.0 - 2.0 %    Neutrophils Absolute 5.15 1.80 - 7.30 E9/L    Immature Granulocytes # 0.06 E9/L    Lymphocytes Absolute 0.81 (L) 1.50 - 4.00 E9/L    Monocytes Absolute 0.33 0.10 - 0.95 E9/L    Eosinophils Absolute 0.03 (L) 0.05 - 0.50 E9/L    Basophils Absolute 0.01 0.00 - 0.20 E9/L   Comprehensive Metabolic Panel w/ Reflex to MG   Result Value Ref Range    Sodium 141 132 - 146 mmol/L    Potassium reflex Magnesium 3.7 3.5 - 5.0 mmol/L    Chloride 96 (L) 98 - 107 mmol/L    CO2 31 (H) 22 - 29 mmol/L    Anion Gap 14 7 - 16 mmol/L    Glucose 92 74 - 99 mg/dL    BUN 17 6 - 23 mg/dL    CREATININE 0.7 0.5 - 1.0 mg/dL    GFR Non-African American >60 >=60 mL/min/1.73    GFR African American >60     Calcium 9.0 8.6 - 10.2 mg/dL    Total Protein 7.6 6.4 - 8.3 g/dL    Albumin 3.2 (L) 3.5 - 5.2 g/dL    Total Bilirubin 0.3 0.0 - 1.2 mg/dL    Alkaline Phosphatase 155 (H) 35 - 104 U/L    ALT 19 0 - 32 U/L    AST 30 0 - 31 U/L   Troponin   Result Value Ref Range    Troponin, High Sensitivity 15 (H) 0 - 9 ng/L   Brain Natriuretic Peptide   Result Value Ref Range    Pro-BNP 1,993 (H) 0 - 450 pg/mL   Troponin   Result Value Ref Range    Troponin, High Sensitivity 13 (H) 0 - 9 ng/L   EKG 12 Lead   Result Value Ref Range    Ventricular Rate 109 BPM    Atrial Rate 98 BPM    QRS Duration 84 ms    Q-T Interval 360 ms    QTc Calculation (Bazett) 484 ms    R Axis 76 degrees    T Axis 79 degrees   EKG 12 Lead   Result Value Ref Range    Ventricular Rate 100 BPM    Atrial Rate 104 BPM    QRS Duration 86 ms    Q-T Interval 354 ms    QTc Calculation (Bazett) 456 ms    R Axis 35 degrees    T Axis 67 degrees       Radiology:  XR CHEST PORTABLE   Final Result   No interval change             ------------------------- NURSING NOTES AND VITALS REVIEWED ---------------------------  Date / Time Roomed:  12/21/2021  3:27 PM  ED Bed Assignment:  [facility]/[place] [id number]    The nursing notes within the ED encounter and vital signs as below have been reviewed. /85   Pulse 102   Temp 98.4 °F (36.9 °C) (Oral)   Resp 16   Ht 5' 7\" (1.702 m)   Wt 92 lb (41.7 kg)   SpO2 97%   BMI 14.41 kg/m²   Oxygen Saturation Interpretation: Normal      ------------------------------------------ PROGRESS NOTES ------------------------------------------  2:11 AM EST  I have spoken with the patient and discussed todays results, in addition to providing specific details for the plan of care and counseling regarding the diagnosis and prognosis. Their questions are answered at this time and they are agreeable with the plan. I discussed at length with them reasons for immediate return here for re evaluation.  They will followup with their and the on call cardiologist and primary care physician by calling their office tomorrow and on Monday.      --------------------------------- ADDITIONAL PROVIDER NOTES ---------------------------------  At this time the patient is without objective evidence of an acute process requiring hospitalization or inpatient management. They have remained hemodynamically stable throughout their entire ED visit and are stable for discharge with outpatient follow-up. The plan has been discussed in detail and they are aware of the specific conditions for emergent return, as well as the importance of follow-up. Discharge Medication List as of 12/21/2021  9:55 PM          Diagnosis:  1. Chronic atrial fibrillation (HCC)        Disposition:  Patient's disposition: Discharge to home  Patient's condition is stable.     Patient was seen and evaluated by both myself and Roberto Borges, [address] [place] [address]  12/23/21 4628

## 2021-12-22 LAB
EKG ATRIAL RATE: 104 BPM
EKG ATRIAL RATE: 98 BPM
EKG Q-T INTERVAL: 354 MS
EKG Q-T INTERVAL: 360 MS
EKG QRS DURATION: 84 MS
EKG QRS DURATION: 86 MS
EKG QTC CALCULATION (BAZETT): 456 MS
EKG QTC CALCULATION (BAZETT): 484 MS
EKG R AXIS: 35 DEGREES
EKG R AXIS: 76 DEGREES
EKG T AXIS: 67 DEGREES
EKG T AXIS: 79 DEGREES
EKG VENTRICULAR RATE: 100 BPM
EKG VENTRICULAR RATE: 109 BPM

## 2021-12-22 PROCEDURE — 93010 ELECTROCARDIOGRAM REPORT: CPT | Performed by: INTERNAL MEDICINE

## 2021-12-22 NOTE — ED NOTES
2100 patient ambulated to and from the restroom with a stand by 1 assist per the patient and daughter she is ambulating at baseline she states she doesn't feel the weakness in her legs anymore that she feels better than when she came in but she can still feel her heart racing daughter at bedside safety maintained     Darrion Ramirez RN  12/21/21 6674

## 2021-12-22 NOTE — ED NOTES
2205 patient fully dressed with all personal belongings at her side she tolerated the removal of her IV well patient and her daughter verbalized understanding for discharge instructions follow up appointments and to resume her normal medications at home patient was wheeled to a private vehicle daughter driving safety maintained     Michelle Ndiaye RN  12/21/21 2226

## 2021-12-22 NOTE — ED NOTES
2144 patient has decided she wants to go home states she feels well enough she will take her nightime meds at home then call the cardiologist in the morning her daughter agreed I updated Dr Modesto Jenkins, RN  12/21/21 2146

## 2023-03-08 ENCOUNTER — HOSPITAL ENCOUNTER (INPATIENT)
Age: 78
LOS: 1 days | Discharge: HOME | End: 2023-03-09
Attending: INTERNAL MEDICINE | Admitting: INTERNAL MEDICINE
Payer: MEDICARE

## 2023-03-08 PROBLEM — D62 ACUTE BLOOD LOSS ANEMIA: Status: ACTIVE | Noted: 2023-03-08

## 2023-03-08 LAB
ABO/RH: NORMAL
ALBUMIN SERPL-MCNC: 3.5 G/DL (ref 3.5–5.2)
ALP BLD-CCNC: 104 U/L (ref 35–104)
ALT SERPL-CCNC: 8 U/L (ref 0–32)
ANION GAP SERPL CALCULATED.3IONS-SCNC: 8 MMOL/L (ref 7–16)
ANTIBODY SCREEN: NORMAL
AST SERPL-CCNC: 20 U/L (ref 0–31)
BASOPHILS ABSOLUTE: 0.03 E9/L (ref 0–0.2)
BASOPHILS RELATIVE PERCENT: 0.5 % (ref 0–2)
BILIRUB SERPL-MCNC: <0.2 MG/DL (ref 0–1.2)
BLOOD BANK DISPENSE STATUS: NORMAL
BLOOD BANK DISPENSE STATUS: NORMAL
BLOOD BANK PRODUCT CODE: NORMAL
BLOOD BANK PRODUCT CODE: NORMAL
BPU ID: NORMAL
BPU ID: NORMAL
BUN BLDV-MCNC: 19 MG/DL (ref 6–23)
CALCIUM SERPL-MCNC: 8.7 MG/DL (ref 8.6–10.2)
CHLORIDE BLD-SCNC: 105 MMOL/L (ref 98–107)
CO2: 27 MMOL/L (ref 22–29)
CREAT SERPL-MCNC: 0.9 MG/DL (ref 0.5–1)
DESCRIPTION BLOOD BANK: NORMAL
DESCRIPTION BLOOD BANK: NORMAL
EOSINOPHILS ABSOLUTE: 0.05 E9/L (ref 0.05–0.5)
EOSINOPHILS RELATIVE PERCENT: 0.9 % (ref 0–6)
FERRITIN: 14 NG/ML
GFR SERPL CREATININE-BSD FRML MDRD: >60 ML/MIN/1.73
GLUCOSE BLD-MCNC: 83 MG/DL (ref 74–99)
HCT VFR BLD CALC: 21.2 % (ref 34–48)
HEMOGLOBIN: 6.3 G/DL (ref 11.5–15.5)
IMMATURE GRANULOCYTES #: 0.03 E9/L
IMMATURE GRANULOCYTES %: 0.5 % (ref 0–5)
IRON SATURATION: 8 % (ref 15–50)
IRON: 27 MCG/DL (ref 37–145)
LYMPHOCYTES ABSOLUTE: 0.71 E9/L (ref 1.5–4)
LYMPHOCYTES RELATIVE PERCENT: 12.2 % (ref 20–42)
MCH RBC QN AUTO: 26.7 PG (ref 26–35)
MCHC RBC AUTO-ENTMCNC: 29.7 % (ref 32–34.5)
MCV RBC AUTO: 89.8 FL (ref 80–99.9)
MONOCYTES ABSOLUTE: 0.5 E9/L (ref 0.1–0.95)
MONOCYTES RELATIVE PERCENT: 8.6 % (ref 2–12)
NEUTROPHILS ABSOLUTE: 4.48 E9/L (ref 1.8–7.3)
NEUTROPHILS RELATIVE PERCENT: 77.3 % (ref 43–80)
PDW BLD-RTO: 16 FL (ref 11.5–15)
PLATELET # BLD: 339 E9/L (ref 130–450)
PMV BLD AUTO: 8.9 FL (ref 7–12)
POTASSIUM SERPL-SCNC: 4.2 MMOL/L (ref 3.5–5)
RBC # BLD: 2.36 E12/L (ref 3.5–5.5)
SODIUM BLD-SCNC: 140 MMOL/L (ref 132–146)
TOTAL IRON BINDING CAPACITY: 338 MCG/DL (ref 250–450)
TOTAL PROTEIN: 6.7 G/DL (ref 6.4–8.3)
WBC # BLD: 5.8 E9/L (ref 4.5–11.5)

## 2023-03-08 PROCEDURE — 36415 COLL VENOUS BLD VENIPUNCTURE: CPT

## 2023-03-08 PROCEDURE — 86901 BLOOD TYPING SEROLOGIC RH(D): CPT

## 2023-03-08 PROCEDURE — 6360000002 HC RX W HCPCS: Performed by: INTERNAL MEDICINE

## 2023-03-08 PROCEDURE — P9016 RBC LEUKOCYTES REDUCED: HCPCS

## 2023-03-08 PROCEDURE — 83540 ASSAY OF IRON: CPT

## 2023-03-08 PROCEDURE — 1200000000 HC SEMI PRIVATE

## 2023-03-08 PROCEDURE — 85025 COMPLETE CBC W/AUTO DIFF WBC: CPT

## 2023-03-08 PROCEDURE — 36430 TRANSFUSION BLD/BLD COMPNT: CPT

## 2023-03-08 PROCEDURE — 86850 RBC ANTIBODY SCREEN: CPT

## 2023-03-08 PROCEDURE — 80053 COMPREHEN METABOLIC PANEL: CPT

## 2023-03-08 PROCEDURE — 86923 COMPATIBILITY TEST ELECTRIC: CPT

## 2023-03-08 PROCEDURE — 6370000000 HC RX 637 (ALT 250 FOR IP)

## 2023-03-08 PROCEDURE — 82728 ASSAY OF FERRITIN: CPT

## 2023-03-08 PROCEDURE — 86900 BLOOD TYPING SEROLOGIC ABO: CPT

## 2023-03-08 PROCEDURE — 6370000000 HC RX 637 (ALT 250 FOR IP): Performed by: INTERNAL MEDICINE

## 2023-03-08 PROCEDURE — 83550 IRON BINDING TEST: CPT

## 2023-03-08 RX ORDER — METOPROLOL SUCCINATE 25 MG/1
12.5 TABLET, EXTENDED RELEASE ORAL DAILY
Status: DISCONTINUED | OUTPATIENT
Start: 2023-03-08 | End: 2023-03-09 | Stop reason: HOSPADM

## 2023-03-08 RX ORDER — PANTOPRAZOLE SODIUM 40 MG/1
40 TABLET, DELAYED RELEASE ORAL
Status: DISCONTINUED | OUTPATIENT
Start: 2023-03-08 | End: 2023-03-09 | Stop reason: HOSPADM

## 2023-03-08 RX ORDER — FUROSEMIDE 10 MG/ML
20 INJECTION INTRAMUSCULAR; INTRAVENOUS ONCE
Status: COMPLETED | OUTPATIENT
Start: 2023-03-08 | End: 2023-03-08

## 2023-03-08 RX ORDER — AMIODARONE HYDROCHLORIDE 200 MG/1
100 TABLET ORAL 2 TIMES DAILY
Status: DISCONTINUED | OUTPATIENT
Start: 2023-03-08 | End: 2023-03-09 | Stop reason: HOSPADM

## 2023-03-08 RX ORDER — ACETAMINOPHEN 325 MG/1
325 TABLET ORAL EVERY 4 HOURS PRN
Status: DISCONTINUED | OUTPATIENT
Start: 2023-03-08 | End: 2023-03-09 | Stop reason: HOSPADM

## 2023-03-08 RX ORDER — SODIUM CHLORIDE 9 MG/ML
INJECTION, SOLUTION INTRAVENOUS PRN
Status: DISCONTINUED | OUTPATIENT
Start: 2023-03-08 | End: 2023-03-09 | Stop reason: HOSPADM

## 2023-03-08 RX ORDER — LEVOTHYROXINE SODIUM 0.07 MG/1
75 TABLET ORAL
Status: DISCONTINUED | OUTPATIENT
Start: 2023-03-09 | End: 2023-03-09 | Stop reason: HOSPADM

## 2023-03-08 RX ADMIN — FUROSEMIDE 20 MG: 10 INJECTION, SOLUTION INTRAVENOUS at 21:04

## 2023-03-08 RX ADMIN — SACUBITRIL AND VALSARTAN 1 TABLET: 24; 26 TABLET, FILM COATED ORAL at 21:57

## 2023-03-08 RX ADMIN — PANTOPRAZOLE SODIUM 40 MG: 40 TABLET, DELAYED RELEASE ORAL at 21:57

## 2023-03-08 RX ADMIN — AMIODARONE HYDROCHLORIDE 100 MG: 200 TABLET ORAL at 21:56

## 2023-03-08 NOTE — CONSULTS
GENERAL SURGERY  CONSULT NOTE  3/8/2023    Physician Consulted: Dr. Catherine Lim  Reason for Consult: Acute blood loss anemia   Referring Physician: Dr. Micheal ROUSE  Rashard Holcomb is a 68 y.o. female A-fib on Eliquis, hypothyroidism, COPD who presents for evaluation of anemia. Per patient and family bedside, patient was sent in from PCP office secondary to abnormal lab with anemia. Patient states that she has a longstanding history of anemia. Patient denies any prior GI bleeds. Patient denies any prior EGD/colonoscopies. Patient is currently on Eliquis. Patient denies any current hematemesis, nausea, vomiting, diarrhea, constipation, melena, hematochezia. Initial lab work notable for, hemoglobin 6.3. Patient ordered 2 units PRBCs with repeat H&H currently pending. Past Medical History:   Diagnosis Date    Atrial fibrillation with RVR ([facility] [address] 06/07/2021    Bilateral hearing loss     Bronchiectasis with acute exacerbation (HCC)     COPD (chronic obstructive pulmonary disease) ([facility] [address] 06/10/2021    Hepatic congestion 06/07/2021    Due to CHF    Interstitial pulmonary fibrosis (HCC)     Moderate to severe mitral regurgitation 06/08/2021    Pulmonary edema cardiac cause ([facility] [address] 06/07/2021    Pulmonary hypertension ([facility] [address] 10/41/3392    Systolic and diastolic CHF, acute on chronic ([facility] [address] 06/08/2021       Past Surgical History:   Procedure Laterality Date    FRACTURE SURGERY  11/24/215    right hip       Medications Prior to Admission:    Prior to Admission medications    Medication Sig Start Date End Date Taking?  Authorizing Provider   levothyroxine (SYNTHROID) 50 MCG tablet Take 75 mcg by mouth daily 7/2/21   Historical Provider, MD   amiodarone (CORDARONE) 200 MG tablet Take 1 tablet by mouth 2 times daily  Patient taking differently: Take 100 mg by mouth 2 times daily 6/13/21   Rosibel Storm DO   apixaban (ELIQUIS) 5 MG TABS tablet Take 1 tablet by mouth 2 times daily 6/13/21   Rosibel Storm,    metoprolol succinate (TOPROL XL) 25 MG extended release tablet Take 0.5 tablets by mouth daily 21   Etienne Storm DO   sacubitril-valsartan (ENTRESTO) 24-26 MG per tablet Take 0.5 tablets by mouth 2 times daily  Patient taking differently: Take 1 tablet by mouth 2 times daily 21   Etienne Storm DO   torsemide (DEMADEX) 20 MG tablet Take 1 tablet by mouth daily  Patient taking differently: Take 20 mg by mouth daily as needed 21   Etienne Storm DO   aspirin (EVER ASPIRIN) 325 MG tablet Take 325 mg by mouth as needed for Pain    Historical Provider, MD   Acetaminophen (APAP) 325 MG TABS Take 325 mg by mouth as needed for Pain  Patient not taking: Reported on 2021    Historical Provider, MD       No Known Allergies    Family History   Problem Relation Age of Onset    Heart Attack Mother          age [de-identified]    Other Father          age 80 unknown cause       Social History     Tobacco Use    Smoking status: Never    Smokeless tobacco: Never   Substance Use Topics    Alcohol use: No    Drug use: No         Review of Systems reviewed and otherwise negative unless noted in HPI    PHYSICAL EXAM:    Vitals:    23 1330   BP: (!) 166/72   Pulse: 65   Resp: 16   Temp: 98.2 °F (36.8 °C)   SpO2: 94%       General Appearance:  awake, alert, oriented, in no acute distress  Skin:  Skin color, texture, turgor normal. No rashes or lesions. Head/face:  NCAT  Eyes:  No gross abnormalities. Lungs:  Normal expansion. Clear to auscultation. No rales, rhonchi, or wheezing. Heart:  Heart regular rate and rhythm  Abdomen:  Soft, non-tender, normal bowel sounds. No bruits, organomegaly or masses.   Extremities: pulses present in all extremities and trace pedal edema    LABS:    CBC  Recent Labs     23  1415   WBC 5.8   HGB 6.3*   HCT 21.2*        BMP  Recent Labs     23  1415      K 4.2      CO2 27   BUN 19   CREATININE 0.9   CALCIUM 8.7     Liver Function  Recent Labs 03/08/23  1415   BILITOT <0.2   AST 20   ALT 8   ALKPHOS 104   PROT 6.7   LABALBU 3.5     No results for input(s): LACTATE in the last 72 hours. No results for input(s): INR, PTT in the last 72 hours. Invalid input(s): PT    RADIOLOGY    No results found. ASSESSMENT:  68 y.o. female with anemia and history of A-fib on Eliquis    PLAN:  -Continue to trend H&H and transfuse fuses necessary  -Start patient on PPI empirically  -Recommended endoscopic evaluation for source of patient's current anemia. Patient currently unsure if she would like to proceed with any type of endoscopic evaluation.  -Okay for diet currently pending patient/family decision.  - Discussed with Dr. Princess Irwin    Electronically signed by Yelitza Vizcaino DO on 3/8/23 at 3:15 PM EST    The patient was seen and examined with the surgical resident on 3/8/2023. The chart was reviewed. I agree with the assessment and plan. Presently, the patient does not wish to have any endoscopic evaluation.

## 2023-03-08 NOTE — CARE COORDINATION
Met with patient and daughter, Demetrio Conner about diagnosis and discharge plan of care. Pt sent in from Dr Fernanda Patton office for eval of anemia. Surgical consult. Pt lives with daughter in 2nd floor apt. 13 + 5 steps in. Does not use DME but has wheeled walker, cane and did have o2 through [address] [place] [address] Road but sent back. Will follow pending surgical input. At this time, no needs-mjo    Case Management Assessment  Initial Evaluation    Date/Time of Evaluation: 3/8/2023 2:54 PM  Assessment Completed by: Lynda Brito RN    If patient is discharged prior to next notation, then this note serves as note for discharge by case management. Patient Name: Bruce Cassidy                   YOB: 1945  Diagnosis: Acute blood loss anemia [D62]                   Date / Time: 3/8/2023  1:12 PM    Patient Admission Status: Inpatient   Readmission Risk (Low < 19, Mod (19-27), High > 27): No data recorded  Current PCP: Mariana Lund, DO  PCP verified by CM? Yes    Chart Reviewed: Yes      History Provided by: Patient  Patient Orientation: Alert and Oriented, Person, Place, Situation, Self    Patient Cognition: Alert    Hospitalization in the last 30 days (Readmission):  No    If yes, Readmission Assessment in CM Navigator will be completed. Advance Directives:      Code Status: DNR-CCA   Patient's Primary Decision Maker is:        Discharge Planning:    Patient lives with: Children Type of Home: House  Primary Care Giver: Self  Patient Support Systems include: Children   Current Financial resources:    Current community resources:    Current services prior to admission: None            Current DME:              Type of Home Care services:  None    ADLS  Prior functional level: Independent in ADLs/IADLs  Current functional level: Independent in ADLs/IADLs    PT AM-PAC:   /24  OT AM-PAC:   /24    Family can provide assistance at DC:  Yes  Would you like Case Management to discuss the discharge plan with any other family members/significant others, and if so, who? Yes  Plans to Return to Present Housing: Yes  Other Identified Issues/Barriers to RETURNING to current housing: none  Potential Assistance needed at discharge: N/A            Potential DME:    Patient expects to discharge to: [facility] for transportation at discharge:      Financial    Payor: Deepika Marti / Plan: Alek Rojas ESSENTIAL/PLUS / Product Type: *No Product type* /     Does insurance require precert for SNF: Yes    Potential assistance Purchasing Medications: No  Meds-to-Beds request:        [address], [place] - [address] [facility]  Jose Velasco [address]  Phone: [phone] Fax: [phone]    [address] [phone] Mary Reyes [phone] [address] [place] [address]  Phone: [phone] Fax: [phone]      Notes:    Factors facilitating achievement of predicted outcomes: Family support    Barriers to discharge: none    Additional Case Management Notes: The Plan for Transition of Care is related to the following treatment goals of Acute blood loss anemia [B88]    IF APPLICABLE: The Patient and/or patient representative Jovanni Waggoner and her family were provided with a choice of provider and agrees with the discharge plan. Freedom of choice list with basic dialogue that supports the patient's individualized plan of care/goals and shares the quality data associated with the providers was provided to:     Patient Representative Name:       The Patient and/or Patient Representative Agree with the Discharge Plan?       Catracho Connelly RN  Case Management Department  Ph: [phone] Fax: [phone]

## 2023-03-08 NOTE — PROGRESS NOTES
Called Gave consult to Dr. Greer Brown for acute blood loss anemia on anticoag     Sent message to resident Elroy

## 2023-03-08 NOTE — PROGRESS NOTES
Per patient and daughter Marilee Console, she is not interested in having a scope of any sort at this time.

## 2023-03-09 VITALS
OXYGEN SATURATION: 96 % | HEART RATE: 59 BPM | TEMPERATURE: 97.4 F | DIASTOLIC BLOOD PRESSURE: 79 MMHG | RESPIRATION RATE: 16 BRPM | SYSTOLIC BLOOD PRESSURE: 139 MMHG

## 2023-03-09 LAB
ALBUMIN SERPL-MCNC: 3.6 G/DL (ref 3.5–5.2)
ALP BLD-CCNC: 101 U/L (ref 35–104)
ALT SERPL-CCNC: 9 U/L (ref 0–32)
ANION GAP SERPL CALCULATED.3IONS-SCNC: 9 MMOL/L (ref 7–16)
AST SERPL-CCNC: 15 U/L (ref 0–31)
BILIRUB SERPL-MCNC: 0.3 MG/DL (ref 0–1.2)
BUN BLDV-MCNC: 17 MG/DL (ref 6–23)
CALCIUM SERPL-MCNC: 8.6 MG/DL (ref 8.6–10.2)
CHLORIDE BLD-SCNC: 101 MMOL/L (ref 98–107)
CO2: 30 MMOL/L (ref 22–29)
CREAT SERPL-MCNC: 1.1 MG/DL (ref 0.5–1)
GFR SERPL CREATININE-BSD FRML MDRD: 51 ML/MIN/1.73
GLUCOSE BLD-MCNC: 85 MG/DL (ref 74–99)
HCT VFR BLD CALC: 31.1 % (ref 34–48)
HEMOGLOBIN: 9.5 G/DL (ref 11.5–15.5)
POTASSIUM SERPL-SCNC: 4.1 MMOL/L (ref 3.5–5)
SODIUM BLD-SCNC: 140 MMOL/L (ref 132–146)
TOTAL PROTEIN: 7.1 G/DL (ref 6.4–8.3)

## 2023-03-09 PROCEDURE — 85018 HEMOGLOBIN: CPT

## 2023-03-09 PROCEDURE — 6370000000 HC RX 637 (ALT 250 FOR IP): Performed by: INTERNAL MEDICINE

## 2023-03-09 PROCEDURE — 36415 COLL VENOUS BLD VENIPUNCTURE: CPT

## 2023-03-09 PROCEDURE — 85014 HEMATOCRIT: CPT

## 2023-03-09 PROCEDURE — 6370000000 HC RX 637 (ALT 250 FOR IP)

## 2023-03-09 PROCEDURE — 2580000003 HC RX 258: Performed by: INTERNAL MEDICINE

## 2023-03-09 PROCEDURE — 80053 COMPREHEN METABOLIC PANEL: CPT

## 2023-03-09 PROCEDURE — 6360000002 HC RX W HCPCS: Performed by: INTERNAL MEDICINE

## 2023-03-09 RX ORDER — PANTOPRAZOLE SODIUM 40 MG/1
40 TABLET, DELAYED RELEASE ORAL DAILY
Qty: 30 TABLET | Refills: 3 | Status: SHIPPED | OUTPATIENT
Start: 2023-03-09

## 2023-03-09 RX ORDER — AMIODARONE HYDROCHLORIDE 200 MG/1
100 TABLET ORAL 2 TIMES DAILY
Qty: 60 TABLET | Refills: 0 | Status: SHIPPED | OUTPATIENT
Start: 2023-03-09

## 2023-03-09 RX ORDER — AMIODARONE HYDROCHLORIDE 100 MG/1
TABLET ORAL
Qty: 30 TABLET | Refills: 0 | Status: SHIPPED | OUTPATIENT
Start: 2023-03-09

## 2023-03-09 RX ORDER — FUROSEMIDE 10 MG/ML
20 INJECTION INTRAMUSCULAR; INTRAVENOUS ONCE
Status: COMPLETED | OUTPATIENT
Start: 2023-03-09 | End: 2023-03-09

## 2023-03-09 RX ADMIN — AMIODARONE HYDROCHLORIDE 100 MG: 200 TABLET ORAL at 09:01

## 2023-03-09 RX ADMIN — PANTOPRAZOLE SODIUM 40 MG: 40 TABLET, DELAYED RELEASE ORAL at 05:54

## 2023-03-09 RX ADMIN — LEVOTHYROXINE SODIUM 75 MCG: 75 TABLET ORAL at 05:54

## 2023-03-09 RX ADMIN — METOPROLOL SUCCINATE 12.5 MG: 25 TABLET, EXTENDED RELEASE ORAL at 09:01

## 2023-03-09 RX ADMIN — SACUBITRIL AND VALSARTAN 1 TABLET: 24; 26 TABLET, FILM COATED ORAL at 09:06

## 2023-03-09 RX ADMIN — FUROSEMIDE 20 MG: 10 INJECTION, SOLUTION INTRAVENOUS at 01:18

## 2023-03-09 RX ADMIN — SODIUM CHLORIDE 100 MG: 9 INJECTION, SOLUTION INTRAVENOUS at 11:35

## 2023-03-09 RX ADMIN — SODIUM CHLORIDE 25 MG: 9 INJECTION, SOLUTION INTRAVENOUS at 09:13

## 2023-03-09 NOTE — PROGRESS NOTES
CLINICAL PHARMACY NOTE: MEDS TO BEDS    Total # of Prescriptions Filled: 1   The following medications were delivered to the patient:  Pantoprazole 40    Additional Documentation:

## 2023-03-09 NOTE — PROGRESS NOTES
Patient seen this morning. She is refusing endoscopy. No further plans from surgery POV. Will be available as needed.      Electronically signed by Paulo Soto MD on 3/9/2023 at 6:31 AM

## 2023-03-09 NOTE — PLAN OF CARE
Problem: Chronic Conditions and Co-morbidities  Goal: Patient's chronic conditions and co-morbidity symptoms are monitored and maintained or improved  3/9/2023 0154 by Viola Genao RN  Outcome: Progressing     Problem: Discharge Planning  Goal: Discharge to home or other facility with appropriate resources  3/9/2023 0154 by Viola Genao RN  Outcome: Progressing     Problem: Safety - Adult  Goal: Free from fall injury  3/9/2023 0154 by Viola Genao RN  Outcome: Progressing     Problem: ABCDS Injury Assessment  Goal: Absence of physical injury  3/9/2023 0154 by Viola Genao RN  Outcome: Progressing

## 2023-03-09 NOTE — PLAN OF CARE
Problem: Chronic Conditions and Co-morbidities  Goal: Patient's chronic conditions and co-morbidity symptoms are monitored and maintained or improved  3/9/2023 1451 by Anneliese Casas RN  Outcome: Adequate for Discharge     Problem: Discharge Planning  Goal: Discharge to home or other facility with appropriate resources  3/9/2023 1451 by Anneliese Casas RN  Outcome: Adequate for Discharge     Problem: Safety - Adult  Goal: Free from fall injury  3/9/2023 1451 by Anneliese Casas RN  Outcome: Adequate for Discharge     Problem: ABCDS Injury Assessment  Goal: Absence of physical injury  3/9/2023 1451 by Anneliese Casas RN  Outcome: Adequate for Discharge

## 2023-03-09 NOTE — PROGRESS NOTES
Pt received 2 units of PRBC that finished up at 130 am. She also received two doses of lasixs after each unit was complete. Pt is resting comfortably at this time with no complaints.

## 2023-03-09 NOTE — PROGRESS NOTES
P Quality Flow/Interdisciplinary Rounds Progress Note        Quality Flow Rounds held on March 9, 2023    Disciplines Attending:  Bedside Nurse, , , and Nursing Unit Leadership    Federico Harry was admitted on 3/8/2023  1:12 PM    Anticipated Discharge Date:  Expected Discharge Date: 03/10/23    Disposition:    Ned Score:  Ned Scale Score: 22    Readmission Risk              Risk of Unplanned Readmission:  7           Discussed patient goal for the day, patient clinical progression, and barriers to discharge.   The following Goal(s) of the Day/Commitment(s) have been identified:   Discharge [address] NEFTALY [place]  March 9, 2023

## 2023-03-09 NOTE — PROGRESS NOTES
Called Dr. Chuck Tadeo via office regarding unsigned script. Asked if script be E-scribed instead. Office staff states they will give him the message.

## 2023-03-09 NOTE — H&P
CHIEF COMPLAINT:  weakness      HISTORY OF PRESENT ILLNESS:      The patient is a 68 y.o. female patient of presents with routine blood work. She had weakness but not best historian. Her blood work indicated a HB of 6.3  She denies blood in stool, but may not be accurate. She had prior history of bleed but neither her nor daughter remember much of it. She is on NOAC for atrial fibrillation. No shortness of breath after blood.     Past Medical History:    Past Medical History:   Diagnosis Date    Atrial fibrillation with RVR ([facility] 06/07/2021    Bilateral hearing loss     Bronchiectasis with acute exacerbation (HCC)     COPD (chronic obstructive pulmonary disease) ([facility] 06/10/2021    Hepatic congestion 06/07/2021    Due to CHF    Interstitial pulmonary fibrosis (HCC)     Moderate to severe mitral regurgitation 06/08/2021    Pulmonary edema cardiac cause ([facility] 06/07/2021    Pulmonary hypertension ([facility] 43/55/3181    Systolic and diastolic CHF, acute on chronic ([facility] 06/08/2021       Past Surgical History:    Past Surgical History:   Procedure Laterality Date    FRACTURE SURGERY  11/24/215    right hip       Medications Prior to Admission:    Medications Prior to Admission: levothyroxine (SYNTHROID) 50 MCG tablet, Take 75 mcg by mouth daily  amiodarone (CORDARONE) 200 MG tablet, Take 1 tablet by mouth 2 times daily (Patient taking differently: Take 100 mg by mouth 2 times daily)  apixaban (ELIQUIS) 5 MG TABS tablet, Take 1 tablet by mouth 2 times daily  metoprolol succinate (TOPROL XL) 25 MG extended release tablet, Take 0.5 tablets by mouth daily  sacubitril-valsartan (ENTRESTO) 24-26 MG per tablet, Take 0.5 tablets by mouth 2 times daily (Patient taking differently: Take 1 tablet by mouth 2 times daily)  torsemide (DEMADEX) 20 MG tablet, Take 1 tablet by mouth daily (Patient taking differently: Take 20 mg by mouth daily as needed)  [DISCONTINUED] acetaZOLAMIDE (DIAMOX) 250 MG tablet, Take 1 tablet by mouth daily  [DISCONTINUED] potassium & sodium phosphates (PHOS-NAK) 280-160-250 MG PACK, Take 1 packet by mouth 2 times daily (Patient not taking: Reported on 7/13/2021)  aspirin (EVER ASPIRIN) 325 MG tablet, Take 325 mg by mouth as needed for Pain  Acetaminophen (APAP) 325 MG TABS, Take 325 mg by mouth as needed for Pain (Patient not taking: Reported on 7/13/2021)    Allergies:    Patient has no known allergies. Social History:    reports that she has never smoked. She has never used smokeless tobacco. She reports that she does not drink alcohol and does not use drugs. Family History:   family history includes Heart Attack in her mother; Other in her father. REVIEW OF SYSTEMS:  As above in the HPI, otherwise negative    PHYSICAL EXAM:    Vitals:  BP (!) 164/76   Pulse 58   Temp 97.5 °F (36.4 °C) (Oral)   Resp 16   SpO2 97%     General:  Awake, alert, oriented X 3. Well developed, well nourished, well groomed. No apparent distress. HEENT:  Normocephalic, atraumatic. Pupils equal, round, reactive to light. No scleral icterus. No conjunctival injection. Normal lips, teeth, and gums. No nasal discharge. Neck:  Supple  Heart:  RRR, no murmurs, gallops, rubs  Lungs:  CTA bilaterally, bilat symmetrical expansion, no wheeze, rales, or rhonchi  Abdomen:   Bowel sounds present, soft, nontender, no masses, no organomegaly, no peritoneal signs  Extremities:  No clubbing, cyanosis, or edema  Skin:  Warm and dry, no open lesions or rash  Neuro:  Cranial nerves 2-12 intact, no focal deficits  Breast: deferred  Rectal: deferred  Genitalia:  deferred    LABS:  CBC with Differential:    Lab Results   Component Value Date/Time    WBC 5.8 03/08/2023 02:15 PM    RBC 2.36 03/08/2023 02:15 PM    HGB 9.5 03/09/2023 03:45 AM    HCT 31.1 03/09/2023 03:45 AM     03/08/2023 02:15 PM    MCV 89.8 03/08/2023 02:15 PM    MCH 26.7 03/08/2023 02:15 PM    MCHC 29.7 03/08/2023 02:15 PM    RDW 16.0 03/08/2023 02:15 PM    NRBC 0.0 06/10/2021 05:26 AM    LYMPHOPCT 12.2 03/08/2023 02:15 PM    MONOPCT 8.6 03/08/2023 02:15 PM    BASOPCT 0.5 03/08/2023 02:15 PM    MONOSABS 0.50 03/08/2023 02:15 PM    LYMPHSABS 0.71 03/08/2023 02:15 PM    EOSABS 0.05 03/08/2023 02:15 PM    BASOSABS 0.03 03/08/2023 02:15 PM     CMP:    Lab Results   Component Value Date/Time     03/09/2023 03:45 AM    K 4.1 03/09/2023 03:45 AM    K 3.7 12/21/2021 04:30 PM     03/09/2023 03:45 AM    CO2 30 03/09/2023 03:45 AM    BUN 17 03/09/2023 03:45 AM    CREATININE 1.1 03/09/2023 03:45 AM    GFRAA >60 12/21/2021 04:30 PM    LABGLOM 51 03/09/2023 03:45 AM    GLUCOSE 85 03/09/2023 03:45 AM    PROT 7.1 03/09/2023 03:45 AM    LABALBU 3.6 03/09/2023 03:45 AM    CALCIUM 8.6 03/09/2023 03:45 AM    BILITOT 0.3 03/09/2023 03:45 AM    ALKPHOS 101 03/09/2023 03:45 AM    AST 15 03/09/2023 03:45 AM    ALT 9 03/09/2023 03:45 AM     PT/INR:    Lab Results   Component Value Date/Time    PROTIME 10.9 12/13/2016 12:35 PM    INR 1.0 12/13/2016 12:35 PM     @cktotal:3,ckmb:3,ckmbindex:3,troponini:3@  U/A:    Lab Results   Component Value Date/Time    NITRU Negative 11/23/2015 09:18 PM    COLORU Yellow 11/23/2015 09:18 PM    PHUR 6.0 11/23/2015 09:18 PM    WBCUA 5-10 11/23/2015 09:18 PM    RBCUA 0-1 11/23/2015 09:18 PM    BACTERIA MANY 11/23/2015 09:18 PM    CLARITYU Clear 11/23/2015 09:18 PM    SPECGRAV 1.025 11/23/2015 09:18 PM    UROBILINOGEN 0.2 11/23/2015 09:18 PM    BILIRUBINUR Negative 11/23/2015 09:18 PM    BLOODU MODERATE 11/23/2015 09:18 PM    GLUCOSEU Negative 11/23/2015 09:18 PM    KETUA TRACE 11/23/2015 09:18 PM          ASSESSMENT:    Principal Problem:    Acute blood loss anemia  Resolved Problems:    * No resolved hospital problems.  *      PLAN:  Acute blood loss anemia  Received 2 units of blood and tolerated well  Family and patient likely to elect not to undergo EGD of GI work up  Would treat empirically for upper GI bleed  Hold eliquis 2 weeks and stop ASA  If no scope likely home today    Michelle Rico [place]  6:04 AM  3/9/2023

## 2023-03-09 NOTE — CARE COORDINATION
Updated plan of care. Pt hgb 6.2 upon admit. Received 2 units PRBC. Does not want scope. Hgb-9.5 this am. Plan is home today with daughter. Has DME.  Declining any home care services-keegan

## 2023-06-29 ENCOUNTER — APPOINTMENT (OUTPATIENT)
Dept: GENERAL RADIOLOGY | Age: 78
End: 2023-06-29
Payer: MEDICARE

## 2023-06-29 ENCOUNTER — APPOINTMENT (OUTPATIENT)
Dept: CT IMAGING | Age: 78
End: 2023-06-29
Payer: MEDICARE

## 2023-06-29 ENCOUNTER — HOSPITAL ENCOUNTER (INPATIENT)
Age: 78
LOS: 8 days | Discharge: NURSING, REHAB OR LONG-TERM CARE | End: 2023-07-07
Attending: EMERGENCY MEDICINE | Admitting: INTERNAL MEDICINE
Payer: MEDICARE

## 2023-06-29 DIAGNOSIS — K56.609 SMALL BOWEL OBSTRUCTION (HCC): Primary | ICD-10-CM

## 2023-06-29 DIAGNOSIS — K59.00 CONSTIPATION, UNSPECIFIED CONSTIPATION TYPE: ICD-10-CM

## 2023-06-29 LAB
ALBUMIN SERPL-MCNC: 3.9 G/DL (ref 3.5–5.2)
ALP SERPL-CCNC: 131 U/L (ref 35–104)
ALT SERPL-CCNC: 7 U/L (ref 0–32)
ANION GAP SERPL CALCULATED.3IONS-SCNC: 12 MMOL/L (ref 7–16)
ANISOCYTOSIS: ABNORMAL
AST SERPL-CCNC: 24 U/L (ref 0–31)
BACTERIA URNS QL MICRO: ABNORMAL /HPF
BASOPHILS # BLD: 0 E9/L (ref 0–0.2)
BASOPHILS NFR BLD: 0 % (ref 0–2)
BILIRUB SERPL-MCNC: 0.6 MG/DL (ref 0–1.2)
BILIRUB UR QL STRIP: NEGATIVE
BUN SERPL-MCNC: 31 MG/DL (ref 6–23)
CALCIUM SERPL-MCNC: 9.6 MG/DL (ref 8.6–10.2)
CHLORIDE SERPL-SCNC: 95 MMOL/L (ref 98–107)
CLARITY UR: CLEAR
CO2 SERPL-SCNC: 33 MMOL/L (ref 22–29)
COLOR UR: YELLOW
CREAT SERPL-MCNC: 1.5 MG/DL (ref 0.5–1)
EOSINOPHIL # BLD: 0.08 E9/L (ref 0.05–0.5)
EOSINOPHIL NFR BLD: 0.9 % (ref 0–6)
EPI CELLS #/AREA URNS HPF: ABNORMAL /HPF
ERYTHROCYTE [DISTWIDTH] IN BLOOD BY AUTOMATED COUNT: 16.1 FL (ref 11.5–15)
GLUCOSE SERPL-MCNC: 123 MG/DL (ref 74–99)
GLUCOSE UR STRIP-MCNC: NEGATIVE MG/DL
HCT VFR BLD AUTO: 33.4 % (ref 34–48)
HGB BLD-MCNC: 10 G/DL (ref 11.5–15.5)
HGB UR QL STRIP: ABNORMAL
HYPOCHROMIA: ABNORMAL
KETONES UR STRIP-MCNC: ABNORMAL MG/DL
LACTATE BLDV-SCNC: 1.9 MMOL/L (ref 0.5–2.2)
LEUKOCYTE ESTERASE UR QL STRIP: ABNORMAL
LIPASE: 9 U/L (ref 13–60)
LYMPHOCYTES # BLD: 0.44 E9/L (ref 1.5–4)
LYMPHOCYTES NFR BLD: 5.3 % (ref 20–42)
MCH RBC QN AUTO: 28.7 PG (ref 26–35)
MCHC RBC AUTO-ENTMCNC: 29.9 % (ref 32–34.5)
MCV RBC AUTO: 96 FL (ref 80–99.9)
MONOCYTES # BLD: 0.52 E9/L (ref 0.1–0.95)
MONOCYTES NFR BLD: 6.1 % (ref 2–12)
NEUTROPHILS # BLD: 7.66 E9/L (ref 1.8–7.3)
NEUTS SEG NFR BLD: 87.7 % (ref 43–80)
NITRITE UR QL STRIP: NEGATIVE
NRBC BLD-RTO: 0 /100 WBC
OVALOCYTES: ABNORMAL
PH UR STRIP: 5.5 [PH] (ref 5–9)
PLATELET # BLD AUTO: 315 E9/L (ref 130–450)
PMV BLD AUTO: 8.9 FL (ref 7–12)
POIKILOCYTES: ABNORMAL
POLYCHROMASIA: ABNORMAL
POTASSIUM SERPL-SCNC: 3.1 MMOL/L (ref 3.5–5)
PROT SERPL-MCNC: 7.7 G/DL (ref 6.4–8.3)
PROT UR STRIP-MCNC: ABNORMAL MG/DL
RBC # BLD AUTO: 3.48 E12/L (ref 3.5–5.5)
RBC #/AREA URNS HPF: ABNORMAL /HPF (ref 0–2)
SODIUM SERPL-SCNC: 140 MMOL/L (ref 132–146)
SP GR UR STRIP: 1.02 (ref 1–1.03)
TARGET CELLS: ABNORMAL
TOXIC GRANULATION: ABNORMAL
UROBILINOGEN UR STRIP-ACNC: 0.2 E.U./DL
WBC # BLD: 8.7 E9/L (ref 4.5–11.5)
WBC #/AREA URNS HPF: ABNORMAL /HPF (ref 0–5)

## 2023-06-29 PROCEDURE — 93005 ELECTROCARDIOGRAM TRACING: CPT

## 2023-06-29 PROCEDURE — 74018 RADEX ABDOMEN 1 VIEW: CPT

## 2023-06-29 PROCEDURE — 96374 THER/PROPH/DIAG INJ IV PUSH: CPT

## 2023-06-29 PROCEDURE — 83690 ASSAY OF LIPASE: CPT

## 2023-06-29 PROCEDURE — 2000000000 HC ICU R&B

## 2023-06-29 PROCEDURE — 71045 X-RAY EXAM CHEST 1 VIEW: CPT

## 2023-06-29 PROCEDURE — 6360000002 HC RX W HCPCS: Performed by: EMERGENCY MEDICINE

## 2023-06-29 PROCEDURE — 6360000002 HC RX W HCPCS

## 2023-06-29 PROCEDURE — 83605 ASSAY OF LACTIC ACID: CPT

## 2023-06-29 PROCEDURE — 74177 CT ABD & PELVIS W/CONTRAST: CPT

## 2023-06-29 PROCEDURE — 81001 URINALYSIS AUTO W/SCOPE: CPT

## 2023-06-29 PROCEDURE — 85025 COMPLETE CBC W/AUTO DIFF WBC: CPT

## 2023-06-29 PROCEDURE — 99285 EMERGENCY DEPT VISIT HI MDM: CPT

## 2023-06-29 PROCEDURE — 80053 COMPREHEN METABOLIC PANEL: CPT

## 2023-06-29 PROCEDURE — 2580000003 HC RX 258: Performed by: EMERGENCY MEDICINE

## 2023-06-29 PROCEDURE — 36415 COLL VENOUS BLD VENIPUNCTURE: CPT

## 2023-06-29 PROCEDURE — 96375 TX/PRO/DX INJ NEW DRUG ADDON: CPT

## 2023-06-29 PROCEDURE — 96361 HYDRATE IV INFUSION ADD-ON: CPT

## 2023-06-29 PROCEDURE — 6360000004 HC RX CONTRAST MEDICATION: Performed by: RADIOLOGY

## 2023-06-29 PROCEDURE — 96376 TX/PRO/DX INJ SAME DRUG ADON: CPT

## 2023-06-29 RX ORDER — FENTANYL CITRATE 50 UG/ML
25 INJECTION, SOLUTION INTRAMUSCULAR; INTRAVENOUS ONCE
Status: COMPLETED | OUTPATIENT
Start: 2023-06-29 | End: 2023-06-29

## 2023-06-29 RX ORDER — POTASSIUM CHLORIDE 20 MEQ/1
40 TABLET, EXTENDED RELEASE ORAL ONCE
Status: DISCONTINUED | OUTPATIENT
Start: 2023-06-29 | End: 2023-06-29

## 2023-06-29 RX ORDER — SODIUM CHLORIDE 9 MG/ML
50 INJECTION, SOLUTION INTRAVENOUS ONCE
Status: DISCONTINUED | OUTPATIENT
Start: 2023-06-30 | End: 2023-06-30

## 2023-06-29 RX ORDER — SODIUM CHLORIDE 9 MG/ML
INJECTION, SOLUTION INTRAVENOUS CONTINUOUS
Status: DISCONTINUED | OUTPATIENT
Start: 2023-06-29 | End: 2023-06-30

## 2023-06-29 RX ORDER — FENTANYL CITRATE 50 UG/ML
50 INJECTION, SOLUTION INTRAMUSCULAR; INTRAVENOUS ONCE
Status: COMPLETED | OUTPATIENT
Start: 2023-06-29 | End: 2023-06-29

## 2023-06-29 RX ORDER — 0.9 % SODIUM CHLORIDE 0.9 %
1000 INTRAVENOUS SOLUTION INTRAVENOUS ONCE
Status: COMPLETED | OUTPATIENT
Start: 2023-06-29 | End: 2023-06-30

## 2023-06-29 RX ORDER — POTASSIUM CHLORIDE 7.45 MG/ML
10 INJECTION INTRAVENOUS
Status: DISPENSED | OUTPATIENT
Start: 2023-06-29 | End: 2023-06-29

## 2023-06-29 RX ADMIN — POTASSIUM CHLORIDE 10 MEQ: 7.46 INJECTION, SOLUTION INTRAVENOUS at 22:46

## 2023-06-29 RX ADMIN — FENTANYL CITRATE 25 MCG: 0.05 INJECTION, SOLUTION INTRAMUSCULAR; INTRAVENOUS at 20:41

## 2023-06-29 RX ADMIN — IOPAMIDOL 75 ML: 755 INJECTION, SOLUTION INTRAVENOUS at 21:44

## 2023-06-29 RX ADMIN — SODIUM CHLORIDE 1000 ML: 9 INJECTION, SOLUTION INTRAVENOUS at 22:45

## 2023-06-29 RX ADMIN — FENTANYL CITRATE 50 MCG: 50 INJECTION INTRAMUSCULAR; INTRAVENOUS at 22:44

## 2023-06-29 ASSESSMENT — PAIN - FUNCTIONAL ASSESSMENT
PAIN_FUNCTIONAL_ASSESSMENT: ACTIVITIES ARE NOT PREVENTED
PAIN_FUNCTIONAL_ASSESSMENT: 0-10

## 2023-06-29 ASSESSMENT — PAIN DESCRIPTION - LOCATION
LOCATION: ABDOMEN
LOCATION: ABDOMEN;PELVIS

## 2023-06-29 ASSESSMENT — LIFESTYLE VARIABLES
HOW MANY STANDARD DRINKS CONTAINING ALCOHOL DO YOU HAVE ON A TYPICAL DAY: PATIENT DOES NOT DRINK
HOW OFTEN DO YOU HAVE A DRINK CONTAINING ALCOHOL: NEVER

## 2023-06-29 ASSESSMENT — PAIN DESCRIPTION - ORIENTATION: ORIENTATION: MID

## 2023-06-29 ASSESSMENT — PAIN SCALES - GENERAL
PAINLEVEL_OUTOF10: 8
PAINLEVEL_OUTOF10: 2
PAINLEVEL_OUTOF10: 8

## 2023-06-29 ASSESSMENT — PAIN DESCRIPTION - DESCRIPTORS: DESCRIPTORS: ACHING;DISCOMFORT;SHARP

## 2023-06-29 ASSESSMENT — PAIN DESCRIPTION - ONSET: ONSET: ON-GOING

## 2023-06-29 ASSESSMENT — PAIN DESCRIPTION - PAIN TYPE: TYPE: ACUTE PAIN

## 2023-06-30 ENCOUNTER — APPOINTMENT (OUTPATIENT)
Dept: GENERAL RADIOLOGY | Age: 78
End: 2023-06-30
Payer: MEDICARE

## 2023-06-30 ENCOUNTER — ANESTHESIA (OUTPATIENT)
Dept: OPERATING ROOM | Age: 78
End: 2023-06-30
Payer: MEDICARE

## 2023-06-30 ENCOUNTER — ANESTHESIA EVENT (OUTPATIENT)
Dept: OPERATING ROOM | Age: 78
End: 2023-06-30
Payer: MEDICARE

## 2023-06-30 PROBLEM — E43 SEVERE PROTEIN-CALORIE MALNUTRITION (HCC): Chronic | Status: ACTIVE | Noted: 2023-06-30

## 2023-06-30 LAB
ACANTHOCYTES: ABNORMAL
ACANTHOCYTES: ABNORMAL
ALBUMIN SERPL-MCNC: 2.4 G/DL (ref 3.5–5.2)
ALBUMIN SERPL-MCNC: 2.9 G/DL (ref 3.5–5.2)
ALP SERPL-CCNC: 72 U/L (ref 35–104)
ALP SERPL-CCNC: 92 U/L (ref 35–104)
ALT SERPL-CCNC: 15 U/L (ref 0–32)
ALT SERPL-CCNC: 37 U/L (ref 0–32)
ANION GAP SERPL CALCULATED.3IONS-SCNC: 12 MMOL/L (ref 7–16)
ANION GAP SERPL CALCULATED.3IONS-SCNC: 13 MMOL/L (ref 7–16)
ANISOCYTOSIS: ABNORMAL
ANISOCYTOSIS: ABNORMAL
AST SERPL-CCNC: 37 U/L (ref 0–31)
AST SERPL-CCNC: 77 U/L (ref 0–31)
B.E.: 0.5 MMOL/L (ref -3–3)
BASOPHILS # BLD: 0 E9/L (ref 0–0.2)
BASOPHILS # BLD: 0.01 E9/L (ref 0–0.2)
BASOPHILS NFR BLD: 0 % (ref 0–2)
BASOPHILS NFR BLD: 0.1 % (ref 0–2)
BILIRUB SERPL-MCNC: 0.4 MG/DL (ref 0–1.2)
BILIRUB SERPL-MCNC: 0.7 MG/DL (ref 0–1.2)
BUN SERPL-MCNC: 28 MG/DL (ref 6–23)
BUN SERPL-MCNC: 35 MG/DL (ref 6–23)
BURR CELLS: ABNORMAL
BURR CELLS: ABNORMAL
CALCIUM SERPL-MCNC: 7.4 MG/DL (ref 8.6–10.2)
CALCIUM SERPL-MCNC: 7.8 MG/DL (ref 8.6–10.2)
CHLORIDE SERPL-SCNC: 105 MMOL/L (ref 98–107)
CHLORIDE SERPL-SCNC: 112 MMOL/L (ref 98–107)
CO2 SERPL-SCNC: 19 MMOL/L (ref 22–29)
CO2 SERPL-SCNC: 23 MMOL/L (ref 22–29)
COHB: 1.4 % (ref 0–1.5)
CREAT SERPL-MCNC: 1.1 MG/DL (ref 0.5–1)
CREAT SERPL-MCNC: 1.5 MG/DL (ref 0.5–1)
CRITICAL: ABNORMAL
CRP SERPL HS-MCNC: 15.9 MG/DL (ref 0–0.4)
DATE ANALYZED: ABNORMAL
DATE OF COLLECTION: ABNORMAL
EKG ATRIAL RATE: 81 BPM
EKG P AXIS: 90 DEGREES
EKG P-R INTERVAL: 196 MS
EKG Q-T INTERVAL: 442 MS
EKG QRS DURATION: 112 MS
EKG QTC CALCULATION (BAZETT): 513 MS
EKG R AXIS: 81 DEGREES
EKG T AXIS: 76 DEGREES
EKG VENTRICULAR RATE: 81 BPM
EOSINOPHIL # BLD: 0 E9/L (ref 0.05–0.5)
EOSINOPHIL # BLD: 0 E9/L (ref 0.05–0.5)
EOSINOPHIL NFR BLD: 0 % (ref 0–6)
EOSINOPHIL NFR BLD: 0 % (ref 0–6)
ERYTHROCYTE [DISTWIDTH] IN BLOOD BY AUTOMATED COUNT: 15.9 FL (ref 11.5–15)
ERYTHROCYTE [DISTWIDTH] IN BLOOD BY AUTOMATED COUNT: 16.1 FL (ref 11.5–15)
ERYTHROCYTE [SEDIMENTATION RATE] IN BLOOD BY WESTERGREN METHOD: 44 MM/HR (ref 0–20)
FIO2: 100 %
GLUCOSE SERPL-MCNC: 138 MG/DL (ref 74–99)
GLUCOSE SERPL-MCNC: 152 MG/DL (ref 74–99)
GRAM STAIN ORDERABLE: NORMAL
GRAM STAIN ORDERABLE: NORMAL
HCO3: 22.2 MMOL/L (ref 22–26)
HCT VFR BLD AUTO: 23.7 % (ref 34–48)
HCT VFR BLD AUTO: 28.2 % (ref 34–48)
HGB BLD-MCNC: 7.2 G/DL (ref 11.5–15.5)
HGB BLD-MCNC: 8.8 G/DL (ref 11.5–15.5)
HHB: 0.3 % (ref 0–5)
HYPOCHROMIA: ABNORMAL
HYPOCHROMIA: ABNORMAL
IMM GRANULOCYTES # BLD: 0.2 E9/L
IMM GRANULOCYTES NFR BLD: 1.7 % (ref 0–5)
LAB: ABNORMAL
LACTATE BLDV-SCNC: 1 MMOL/L (ref 0.5–2.2)
LACTATE BLDV-SCNC: 1.5 MMOL/L (ref 0.5–2.2)
LYMPHOCYTES # BLD: 0.15 E9/L (ref 1.5–4)
LYMPHOCYTES # BLD: 0.16 E9/L (ref 1.5–4)
LYMPHOCYTES NFR BLD: 1.3 % (ref 20–42)
LYMPHOCYTES NFR BLD: 2.6 % (ref 20–42)
Lab: ABNORMAL
MAGNESIUM SERPL-MCNC: 1.8 MG/DL (ref 1.6–2.6)
MAGNESIUM SERPL-MCNC: 2.5 MG/DL (ref 1.6–2.6)
MAGNESIUM SERPL-MCNC: 2.6 MG/DL (ref 1.6–2.6)
MCH RBC QN AUTO: 29.3 PG (ref 26–35)
MCH RBC QN AUTO: 29.5 PG (ref 26–35)
MCHC RBC AUTO-ENTMCNC: 30.4 % (ref 32–34.5)
MCHC RBC AUTO-ENTMCNC: 31.2 % (ref 32–34.5)
MCV RBC AUTO: 94 FL (ref 80–99.9)
MCV RBC AUTO: 97.1 FL (ref 80–99.9)
METAMYELOCYTES NFR BLD MANUAL: 5.2 % (ref 0–1)
METHB: 0.3 % (ref 0–1.5)
MODE: AC
MONOCYTES # BLD: 0.49 E9/L (ref 0.1–0.95)
MONOCYTES # BLD: 0.62 E9/L (ref 0.1–0.95)
MONOCYTES NFR BLD: 5.2 % (ref 2–12)
MONOCYTES NFR BLD: 8.7 % (ref 2–12)
MYELOCYTES NFR BLD MANUAL: 0.9 % (ref 0–0)
NEUTROPHILS # BLD: 10.86 E9/L (ref 1.8–7.3)
NEUTROPHILS # BLD: 4.81 E9/L (ref 1.8–7.3)
NEUTS SEG NFR BLD: 82.6 % (ref 43–80)
NEUTS SEG NFR BLD: 91.7 % (ref 43–80)
NRBC BLD-RTO: 0 /100 WBC
O2 CONTENT: 14.1 ML/DL
O2 SATURATION: 99.7 % (ref 92–98.5)
O2HB: 98 % (ref 94–97)
OPERATOR ID: 2485
OVALOCYTES: ABNORMAL
OVALOCYTES: ABNORMAL
PATIENT TEMP: 37 C
PCO2: 25.9 MMHG (ref 35–45)
PEEP/CPAP: 5 CMH2O
PFO2: 3.6 MMHG/%
PH BLOOD GAS: 7.55 (ref 7.35–7.45)
PHOSPHATE SERPL-MCNC: 2.9 MG/DL (ref 2.5–4.5)
PHOSPHATE SERPL-MCNC: 3.8 MG/DL (ref 2.5–4.5)
PLATELET # BLD AUTO: 247 E9/L (ref 130–450)
PLATELET # BLD AUTO: 258 E9/L (ref 130–450)
PMV BLD AUTO: 9.3 FL (ref 7–12)
PMV BLD AUTO: 9.6 FL (ref 7–12)
PO2: 360.1 MMHG (ref 75–100)
POIKILOCYTES: ABNORMAL
POIKILOCYTES: ABNORMAL
POLYCHROMASIA: ABNORMAL
POLYCHROMASIA: ABNORMAL
POTASSIUM SERPL-SCNC: 3.6 MMOL/L (ref 3.5–5)
POTASSIUM SERPL-SCNC: 4.2 MMOL/L (ref 3.5–5)
POTASSIUM SERPL-SCNC: 4.6 MMOL/L (ref 3.5–5)
PROCALCITONIN: 34.55 NG/ML (ref 0–0.08)
PROT SERPL-MCNC: 5.6 G/DL (ref 6.4–8.3)
PROT SERPL-MCNC: 6.1 G/DL (ref 6.4–8.3)
RBC # BLD AUTO: 2.44 E12/L (ref 3.5–5.5)
RBC # BLD AUTO: 3 E12/L (ref 3.5–5.5)
RI(T): 0.91
RR MECHANICAL: 16 B/MIN
SODIUM SERPL-SCNC: 141 MMOL/L (ref 132–146)
SODIUM SERPL-SCNC: 143 MMOL/L (ref 132–146)
SOURCE, BLOOD GAS: ABNORMAL
THB: 9.5 G/DL (ref 11.5–16.5)
TIME ANALYZED: 533
VT MECHANICAL: 380 ML
WBC # BLD: 11.8 E9/L (ref 4.5–11.5)
WBC # BLD: 5.4 E9/L (ref 4.5–11.5)

## 2023-06-30 PROCEDURE — 2580000003 HC RX 258

## 2023-06-30 PROCEDURE — 6360000002 HC RX W HCPCS: Performed by: INTERNAL MEDICINE

## 2023-06-30 PROCEDURE — 84132 ASSAY OF SERUM POTASSIUM: CPT

## 2023-06-30 PROCEDURE — 88307 TISSUE EXAM BY PATHOLOGIST: CPT

## 2023-06-30 PROCEDURE — 94002 VENT MGMT INPAT INIT DAY: CPT

## 2023-06-30 PROCEDURE — 7100000000 HC PACU RECOVERY - FIRST 15 MIN

## 2023-06-30 PROCEDURE — 5A1945Z RESPIRATORY VENTILATION, 24-96 CONSECUTIVE HOURS: ICD-10-PCS | Performed by: INTERNAL MEDICINE

## 2023-06-30 PROCEDURE — 0D1N0Z4 BYPASS SIGMOID COLON TO CUTANEOUS, OPEN APPROACH: ICD-10-PCS | Performed by: SURGERY

## 2023-06-30 PROCEDURE — C9113 INJ PANTOPRAZOLE SODIUM, VIA: HCPCS | Performed by: SURGERY

## 2023-06-30 PROCEDURE — 2709999900 HC NON-CHARGEABLE SUPPLY: Performed by: SURGERY

## 2023-06-30 PROCEDURE — 86140 C-REACTIVE PROTEIN: CPT

## 2023-06-30 PROCEDURE — 6360000002 HC RX W HCPCS

## 2023-06-30 PROCEDURE — 85651 RBC SED RATE NONAUTOMATED: CPT

## 2023-06-30 PROCEDURE — 6370000000 HC RX 637 (ALT 250 FOR IP): Performed by: INTERNAL MEDICINE

## 2023-06-30 PROCEDURE — 87102 FUNGUS ISOLATION CULTURE: CPT

## 2023-06-30 PROCEDURE — 83735 ASSAY OF MAGNESIUM: CPT

## 2023-06-30 PROCEDURE — 6360000002 HC RX W HCPCS: Performed by: NURSE PRACTITIONER

## 2023-06-30 PROCEDURE — 2000000000 HC ICU R&B

## 2023-06-30 PROCEDURE — 87070 CULTURE OTHR SPECIMN AEROBIC: CPT

## 2023-06-30 PROCEDURE — 84145 PROCALCITONIN (PCT): CPT

## 2023-06-30 PROCEDURE — 2580000003 HC RX 258: Performed by: INTERNAL MEDICINE

## 2023-06-30 PROCEDURE — 2580000003 HC RX 258: Performed by: NURSE ANESTHETIST, CERTIFIED REGISTERED

## 2023-06-30 PROCEDURE — 87081 CULTURE SCREEN ONLY: CPT

## 2023-06-30 PROCEDURE — 6360000002 HC RX W HCPCS: Performed by: NURSE ANESTHETIST, CERTIFIED REGISTERED

## 2023-06-30 PROCEDURE — 71045 X-RAY EXAM CHEST 1 VIEW: CPT

## 2023-06-30 PROCEDURE — 0BH17EZ INSERTION OF ENDOTRACHEAL AIRWAY INTO TRACHEA, VIA NATURAL OR ARTIFICIAL OPENING: ICD-10-PCS | Performed by: INTERNAL MEDICINE

## 2023-06-30 PROCEDURE — 84100 ASSAY OF PHOSPHORUS: CPT

## 2023-06-30 PROCEDURE — 2500000003 HC RX 250 WO HCPCS: Performed by: NURSE ANESTHETIST, CERTIFIED REGISTERED

## 2023-06-30 PROCEDURE — 85025 COMPLETE CBC W/AUTO DIFF WBC: CPT

## 2023-06-30 PROCEDURE — 2500000003 HC RX 250 WO HCPCS: Performed by: NURSE PRACTITIONER

## 2023-06-30 PROCEDURE — 3600000014 HC SURGERY LEVEL 4 ADDTL 15MIN: Performed by: SURGERY

## 2023-06-30 PROCEDURE — 36620 INSERTION CATHETER ARTERY: CPT

## 2023-06-30 PROCEDURE — 37799 UNLISTED PX VASCULAR SURGERY: CPT

## 2023-06-30 PROCEDURE — 87186 SC STD MICRODIL/AGAR DIL: CPT

## 2023-06-30 PROCEDURE — 3700000001 HC ADD 15 MINUTES (ANESTHESIA): Performed by: SURGERY

## 2023-06-30 PROCEDURE — 87075 CULTR BACTERIA EXCEPT BLOOD: CPT

## 2023-06-30 PROCEDURE — 87077 CULTURE AEROBIC IDENTIFY: CPT

## 2023-06-30 PROCEDURE — 94664 DEMO&/EVAL PT USE INHALER: CPT

## 2023-06-30 PROCEDURE — 3600000004 HC SURGERY LEVEL 4 BASE: Performed by: SURGERY

## 2023-06-30 PROCEDURE — 83605 ASSAY OF LACTIC ACID: CPT

## 2023-06-30 PROCEDURE — 93010 ELECTROCARDIOGRAM REPORT: CPT | Performed by: INTERNAL MEDICINE

## 2023-06-30 PROCEDURE — 87205 SMEAR GRAM STAIN: CPT

## 2023-06-30 PROCEDURE — 87206 SMEAR FLUORESCENT/ACID STAI: CPT

## 2023-06-30 PROCEDURE — 2580000003 HC RX 258: Performed by: SURGERY

## 2023-06-30 PROCEDURE — 82805 BLOOD GASES W/O2 SATURATION: CPT

## 2023-06-30 PROCEDURE — 2580000003 HC RX 258: Performed by: NURSE PRACTITIONER

## 2023-06-30 PROCEDURE — 3700000000 HC ANESTHESIA ATTENDED CARE: Performed by: SURGERY

## 2023-06-30 PROCEDURE — 87040 BLOOD CULTURE FOR BACTERIA: CPT

## 2023-06-30 PROCEDURE — 0DTN0ZZ RESECTION OF SIGMOID COLON, OPEN APPROACH: ICD-10-PCS | Performed by: SURGERY

## 2023-06-30 PROCEDURE — 87116 MYCOBACTERIA CULTURE: CPT

## 2023-06-30 PROCEDURE — 94640 AIRWAY INHALATION TREATMENT: CPT

## 2023-06-30 PROCEDURE — 7100000001 HC PACU RECOVERY - ADDTL 15 MIN

## 2023-06-30 PROCEDURE — A4216 STERILE WATER/SALINE, 10 ML: HCPCS | Performed by: SURGERY

## 2023-06-30 PROCEDURE — 6360000002 HC RX W HCPCS: Performed by: SURGERY

## 2023-06-30 PROCEDURE — 36415 COLL VENOUS BLD VENIPUNCTURE: CPT

## 2023-06-30 PROCEDURE — 87147 CULTURE TYPE IMMUNOLOGIC: CPT

## 2023-06-30 PROCEDURE — 2720000010 HC SURG SUPPLY STERILE: Performed by: SURGERY

## 2023-06-30 PROCEDURE — 51702 INSERT TEMP BLADDER CATH: CPT

## 2023-06-30 PROCEDURE — 80053 COMPREHEN METABOLIC PANEL: CPT

## 2023-06-30 RX ORDER — SUCCINYLCHOLINE/SOD CL,ISO/PF 200MG/10ML
SYRINGE (ML) INTRAVENOUS PRN
Status: DISCONTINUED | OUTPATIENT
Start: 2023-06-30 | End: 2023-06-30 | Stop reason: SDUPTHER

## 2023-06-30 RX ORDER — 0.9 % SODIUM CHLORIDE 0.9 %
1000 INTRAVENOUS SOLUTION INTRAVENOUS ONCE
Status: COMPLETED | OUTPATIENT
Start: 2023-06-30 | End: 2023-06-30

## 2023-06-30 RX ORDER — SODIUM CHLORIDE 9 MG/ML
INJECTION, SOLUTION INTRAVENOUS PRN
Status: DISCONTINUED | OUTPATIENT
Start: 2023-06-30 | End: 2023-06-30

## 2023-06-30 RX ORDER — ETOMIDATE 2 MG/ML
INJECTION INTRAVENOUS PRN
Status: DISCONTINUED | OUTPATIENT
Start: 2023-06-30 | End: 2023-06-30 | Stop reason: SDUPTHER

## 2023-06-30 RX ORDER — HEPARIN SODIUM 10000 [USP'U]/ML
5000 INJECTION, SOLUTION INTRAVENOUS; SUBCUTANEOUS EVERY 8 HOURS
Status: DISCONTINUED | OUTPATIENT
Start: 2023-06-30 | End: 2023-07-07 | Stop reason: HOSPADM

## 2023-06-30 RX ORDER — CHLORHEXIDINE GLUCONATE 0.12 MG/ML
15 RINSE ORAL 2 TIMES DAILY
Status: DISCONTINUED | OUTPATIENT
Start: 2023-06-30 | End: 2023-07-01

## 2023-06-30 RX ORDER — PROPOFOL 10 MG/ML
INJECTION, EMULSION INTRAVENOUS
Status: COMPLETED
Start: 2023-06-30 | End: 2023-06-30

## 2023-06-30 RX ORDER — SODIUM CHLORIDE 0.9 % (FLUSH) 0.9 %
5-40 SYRINGE (ML) INJECTION PRN
Status: DISCONTINUED | OUTPATIENT
Start: 2023-06-30 | End: 2023-06-30

## 2023-06-30 RX ORDER — SODIUM CHLORIDE 9 MG/ML
INJECTION, SOLUTION INTRAVENOUS CONTINUOUS
Status: DISCONTINUED | OUTPATIENT
Start: 2023-06-30 | End: 2023-06-30

## 2023-06-30 RX ORDER — FENTANYL CITRATE-0.9 % NACL/PF 20 MCG/2ML
50 SYRINGE (ML) INTRAVENOUS EVERY 30 MIN PRN
Status: DISCONTINUED | OUTPATIENT
Start: 2023-06-30 | End: 2023-07-01

## 2023-06-30 RX ORDER — SODIUM CHLORIDE 9 MG/ML
INJECTION, SOLUTION INTRAVENOUS PRN
Status: DISCONTINUED | OUTPATIENT
Start: 2023-06-30 | End: 2023-07-06 | Stop reason: SDUPTHER

## 2023-06-30 RX ORDER — FENTANYL CITRATE 50 UG/ML
INJECTION, SOLUTION INTRAMUSCULAR; INTRAVENOUS
Status: COMPLETED
Start: 2023-06-30 | End: 2023-06-30

## 2023-06-30 RX ORDER — IPRATROPIUM BROMIDE AND ALBUTEROL SULFATE 2.5; .5 MG/3ML; MG/3ML
1 SOLUTION RESPIRATORY (INHALATION) EVERY 4 HOURS PRN
Status: DISCONTINUED | OUTPATIENT
Start: 2023-06-30 | End: 2023-07-07 | Stop reason: HOSPADM

## 2023-06-30 RX ORDER — DEXAMETHASONE SODIUM PHOSPHATE 4 MG/ML
INJECTION, SOLUTION INTRA-ARTICULAR; INTRALESIONAL; INTRAMUSCULAR; INTRAVENOUS; SOFT TISSUE PRN
Status: DISCONTINUED | OUTPATIENT
Start: 2023-06-30 | End: 2023-06-30 | Stop reason: SDUPTHER

## 2023-06-30 RX ORDER — SODIUM CHLORIDE 0.9 % (FLUSH) 0.9 %
5-40 SYRINGE (ML) INJECTION PRN
Status: DISCONTINUED | OUTPATIENT
Start: 2023-06-30 | End: 2023-07-06 | Stop reason: SDUPTHER

## 2023-06-30 RX ORDER — SODIUM CHLORIDE 0.9 % (FLUSH) 0.9 %
5-40 SYRINGE (ML) INJECTION EVERY 12 HOURS SCHEDULED
Status: DISCONTINUED | OUTPATIENT
Start: 2023-06-30 | End: 2023-07-06 | Stop reason: SDUPTHER

## 2023-06-30 RX ORDER — ONDANSETRON 2 MG/ML
4 INJECTION INTRAMUSCULAR; INTRAVENOUS EVERY 6 HOURS PRN
Status: DISCONTINUED | OUTPATIENT
Start: 2023-06-30 | End: 2023-06-30

## 2023-06-30 RX ORDER — ARFORMOTEROL TARTRATE 15 UG/2ML
15 SOLUTION RESPIRATORY (INHALATION) 2 TIMES DAILY
Status: DISCONTINUED | OUTPATIENT
Start: 2023-06-30 | End: 2023-07-07 | Stop reason: HOSPADM

## 2023-06-30 RX ORDER — ROCURONIUM BROMIDE 10 MG/ML
INJECTION, SOLUTION INTRAVENOUS PRN
Status: DISCONTINUED | OUTPATIENT
Start: 2023-06-30 | End: 2023-06-30 | Stop reason: SDUPTHER

## 2023-06-30 RX ORDER — MAGNESIUM SULFATE IN WATER 40 MG/ML
2000 INJECTION, SOLUTION INTRAVENOUS ONCE
Status: COMPLETED | OUTPATIENT
Start: 2023-06-30 | End: 2023-06-30

## 2023-06-30 RX ORDER — ONDANSETRON 2 MG/ML
INJECTION INTRAMUSCULAR; INTRAVENOUS PRN
Status: DISCONTINUED | OUTPATIENT
Start: 2023-06-30 | End: 2023-06-30 | Stop reason: SDUPTHER

## 2023-06-30 RX ORDER — HEPARIN SODIUM 10000 [USP'U]/ML
5000 INJECTION, SOLUTION INTRAVENOUS; SUBCUTANEOUS 2 TIMES DAILY
Status: DISCONTINUED | OUTPATIENT
Start: 2023-06-30 | End: 2023-06-30

## 2023-06-30 RX ORDER — POTASSIUM CHLORIDE 7.45 MG/ML
10 INJECTION INTRAVENOUS
Status: COMPLETED | OUTPATIENT
Start: 2023-06-30 | End: 2023-06-30

## 2023-06-30 RX ORDER — ALBUTEROL SULFATE 2.5 MG/3ML
2.5 SOLUTION RESPIRATORY (INHALATION) EVERY 4 HOURS
Status: DISCONTINUED | OUTPATIENT
Start: 2023-06-30 | End: 2023-07-07 | Stop reason: HOSPADM

## 2023-06-30 RX ORDER — SODIUM CHLORIDE 9 MG/ML
INJECTION, SOLUTION INTRAVENOUS CONTINUOUS
Status: DISCONTINUED | OUTPATIENT
Start: 2023-06-30 | End: 2023-07-01

## 2023-06-30 RX ORDER — 0.9 % SODIUM CHLORIDE 0.9 %
500 INTRAVENOUS SOLUTION INTRAVENOUS ONCE
Status: COMPLETED | OUTPATIENT
Start: 2023-06-30 | End: 2023-07-01

## 2023-06-30 RX ORDER — SODIUM CHLORIDE 9 MG/ML
INJECTION, SOLUTION INTRAVENOUS CONTINUOUS PRN
Status: DISCONTINUED | OUTPATIENT
Start: 2023-06-30 | End: 2023-06-30 | Stop reason: SDUPTHER

## 2023-06-30 RX ORDER — LIDOCAINE HYDROCHLORIDE 20 MG/ML
INJECTION, SOLUTION EPIDURAL; INFILTRATION; INTRACAUDAL; PERINEURAL PRN
Status: DISCONTINUED | OUTPATIENT
Start: 2023-06-30 | End: 2023-06-30 | Stop reason: SDUPTHER

## 2023-06-30 RX ORDER — FENTANYL CITRATE-0.9 % NACL/PF 20 MCG/2ML
50 SYRINGE (ML) INTRAVENOUS EVERY 30 MIN PRN
Status: DISCONTINUED | OUTPATIENT
Start: 2023-06-30 | End: 2023-06-30

## 2023-06-30 RX ORDER — 0.9 % SODIUM CHLORIDE 0.9 %
500 INTRAVENOUS SOLUTION INTRAVENOUS ONCE
Status: COMPLETED | OUTPATIENT
Start: 2023-06-30 | End: 2023-06-30

## 2023-06-30 RX ORDER — ONDANSETRON 4 MG/1
4 TABLET, ORALLY DISINTEGRATING ORAL EVERY 8 HOURS PRN
Status: DISCONTINUED | OUTPATIENT
Start: 2023-06-30 | End: 2023-06-30

## 2023-06-30 RX ORDER — FENTANYL CITRATE 50 UG/ML
INJECTION, SOLUTION INTRAMUSCULAR; INTRAVENOUS PRN
Status: DISCONTINUED | OUTPATIENT
Start: 2023-06-30 | End: 2023-06-30 | Stop reason: SDUPTHER

## 2023-06-30 RX ORDER — PROPOFOL 10 MG/ML
5-50 INJECTION, EMULSION INTRAVENOUS CONTINUOUS
Status: DISCONTINUED | OUTPATIENT
Start: 2023-06-30 | End: 2023-06-30

## 2023-06-30 RX ORDER — FENTANYL CITRATE 50 UG/ML
100 INJECTION, SOLUTION INTRAMUSCULAR; INTRAVENOUS ONCE
Status: COMPLETED | OUTPATIENT
Start: 2023-06-30 | End: 2023-06-30

## 2023-06-30 RX ORDER — SODIUM CHLORIDE 0.9 % (FLUSH) 0.9 %
5-40 SYRINGE (ML) INJECTION EVERY 12 HOURS SCHEDULED
Status: DISCONTINUED | OUTPATIENT
Start: 2023-06-30 | End: 2023-06-30

## 2023-06-30 RX ORDER — PANTOPRAZOLE SODIUM 40 MG/1
40 TABLET, DELAYED RELEASE ORAL DAILY
Status: DISCONTINUED | OUTPATIENT
Start: 2023-06-30 | End: 2023-07-06 | Stop reason: ALTCHOICE

## 2023-06-30 RX ADMIN — ETOMIDATE 12 MG: 2 INJECTION, SOLUTION INTRAVENOUS at 01:03

## 2023-06-30 RX ADMIN — ALBUTEROL SULFATE 2.5 MG: 2.5 SOLUTION RESPIRATORY (INHALATION) at 23:52

## 2023-06-30 RX ADMIN — SODIUM CHLORIDE: 9 INJECTION, SOLUTION INTRAVENOUS at 01:40

## 2023-06-30 RX ADMIN — ARFORMOTEROL TARTRATE 15 MCG: 15 SOLUTION RESPIRATORY (INHALATION) at 13:17

## 2023-06-30 RX ADMIN — ROCURONIUM BROMIDE 50 MG: 10 INJECTION, SOLUTION INTRAVENOUS at 01:15

## 2023-06-30 RX ADMIN — PIPERACILLIN AND TAZOBACTAM 4500 MG: 4; .5 INJECTION, POWDER, LYOPHILIZED, FOR SOLUTION INTRAVENOUS at 21:42

## 2023-06-30 RX ADMIN — DEXAMETHASONE SODIUM PHOSPHATE 8 MG: 4 INJECTION, SOLUTION INTRA-ARTICULAR; INTRALESIONAL; INTRAMUSCULAR; INTRAVENOUS; SOFT TISSUE at 01:03

## 2023-06-30 RX ADMIN — SODIUM CHLORIDE: 9 INJECTION, SOLUTION INTRAVENOUS at 00:55

## 2023-06-30 RX ADMIN — ALBUTEROL SULFATE 2.5 MG: 2.5 SOLUTION RESPIRATORY (INHALATION) at 13:17

## 2023-06-30 RX ADMIN — HEPARIN SODIUM 5000 UNITS: 10000 INJECTION INTRAVENOUS; SUBCUTANEOUS at 21:31

## 2023-06-30 RX ADMIN — PROPOFOL 20 MCG/KG/MIN: 10 INJECTION, EMULSION INTRAVENOUS at 02:57

## 2023-06-30 RX ADMIN — POTASSIUM CHLORIDE 10 MEQ: 7.46 INJECTION, SOLUTION INTRAVENOUS at 06:42

## 2023-06-30 RX ADMIN — HEPARIN SODIUM 5000 UNITS: 10000 INJECTION INTRAVENOUS; SUBCUTANEOUS at 18:36

## 2023-06-30 RX ADMIN — SODIUM CHLORIDE 500 ML: 9 INJECTION, SOLUTION INTRAVENOUS at 10:51

## 2023-06-30 RX ADMIN — FENTANYL CITRATE 25 MCG: 50 INJECTION, SOLUTION INTRAMUSCULAR; INTRAVENOUS at 01:03

## 2023-06-30 RX ADMIN — FENTANYL CITRATE 100 MCG: 50 INJECTION, SOLUTION INTRAMUSCULAR; INTRAVENOUS at 02:55

## 2023-06-30 RX ADMIN — ARFORMOTEROL TARTRATE 15 MCG: 15 SOLUTION RESPIRATORY (INHALATION) at 19:59

## 2023-06-30 RX ADMIN — PIPERACILLIN AND TAZOBACTAM 4500 MG: 4; .5 INJECTION, POWDER, LYOPHILIZED, FOR SOLUTION INTRAVENOUS at 09:30

## 2023-06-30 RX ADMIN — SODIUM CHLORIDE 1000 ML: 9 INJECTION, SOLUTION INTRAVENOUS at 06:30

## 2023-06-30 RX ADMIN — POTASSIUM CHLORIDE 10 MEQ: 7.46 INJECTION, SOLUTION INTRAVENOUS at 09:48

## 2023-06-30 RX ADMIN — PIPERACILLIN AND TAZOBACTAM 4500 MG: 4; .5 INJECTION, POWDER, LYOPHILIZED, FOR SOLUTION INTRAVENOUS at 00:03

## 2023-06-30 RX ADMIN — Medication 120 MG: at 01:03

## 2023-06-30 RX ADMIN — FENTANYL CITRATE 75 MCG: 50 INJECTION, SOLUTION INTRAMUSCULAR; INTRAVENOUS at 01:17

## 2023-06-30 RX ADMIN — Medication 10 ML: at 21:29

## 2023-06-30 RX ADMIN — POTASSIUM CHLORIDE 10 MEQ: 7.46 INJECTION, SOLUTION INTRAVENOUS at 08:48

## 2023-06-30 RX ADMIN — PANTOPRAZOLE SODIUM 40 MG: 40 INJECTION, POWDER, FOR SOLUTION INTRAVENOUS at 09:54

## 2023-06-30 RX ADMIN — PROTHROMBIN, COAGULATION FACTOR VII HUMAN, COAGULATION FACTOR IX HUMAN, COAGULATION FACTOR X HUMAN, PROTEIN C, PROTEIN S HUMAN, AND WATER 2000 UNITS: KIT at 00:30

## 2023-06-30 RX ADMIN — SODIUM CHLORIDE: 9 INJECTION, SOLUTION INTRAVENOUS at 00:05

## 2023-06-30 RX ADMIN — SODIUM CHLORIDE: 9 INJECTION, SOLUTION INTRAVENOUS at 23:44

## 2023-06-30 RX ADMIN — SODIUM CHLORIDE 500 ML: 9 INJECTION, SOLUTION INTRAVENOUS at 22:16

## 2023-06-30 RX ADMIN — ALBUTEROL SULFATE 2.5 MG: 2.5 SOLUTION RESPIRATORY (INHALATION) at 19:59

## 2023-06-30 RX ADMIN — SODIUM CHLORIDE: 9 INJECTION, SOLUTION INTRAVENOUS at 02:59

## 2023-06-30 RX ADMIN — LIDOCAINE HYDROCHLORIDE 60 MG: 20 INJECTION, SOLUTION EPIDURAL; INFILTRATION; INTRACAUDAL; PERINEURAL at 01:03

## 2023-06-30 RX ADMIN — POTASSIUM CHLORIDE 10 MEQ: 7.46 INJECTION, SOLUTION INTRAVENOUS at 07:43

## 2023-06-30 RX ADMIN — CHLORHEXIDINE GLUCONATE 0.12% ORAL RINSE 15 ML: 1.2 LIQUID ORAL at 21:29

## 2023-06-30 RX ADMIN — CHLORHEXIDINE GLUCONATE 0.12% ORAL RINSE 15 ML: 1.2 LIQUID ORAL at 10:51

## 2023-06-30 RX ADMIN — HEPARIN SODIUM 5000 UNITS: 10000 INJECTION INTRAVENOUS; SUBCUTANEOUS at 09:31

## 2023-06-30 RX ADMIN — ONDANSETRON 4 MG: 2 INJECTION INTRAMUSCULAR; INTRAVENOUS at 01:03

## 2023-06-30 RX ADMIN — MAGNESIUM SULFATE HEPTAHYDRATE 2000 MG: 40 INJECTION, SOLUTION INTRAVENOUS at 06:38

## 2023-06-30 RX ADMIN — Medication 50 MCG/HR: at 03:02

## 2023-06-30 RX ADMIN — ALBUTEROL SULFATE 2.5 MG: 2.5 SOLUTION RESPIRATORY (INHALATION) at 16:29

## 2023-06-30 ASSESSMENT — PULMONARY FUNCTION TESTS
PIF_VALUE: 17
PIF_VALUE: 20
PIF_VALUE: 18
PIF_VALUE: 19
PIF_VALUE: 20
PIF_VALUE: 17
PIF_VALUE: 20
PIF_VALUE: 19
PIF_VALUE: 20
PIF_VALUE: 19
PIF_VALUE: 18
PIF_VALUE: 22
PIF_VALUE: 19
PIF_VALUE: 19
PIF_VALUE: 21
PIF_VALUE: 21
PIF_VALUE: 19
PIF_VALUE: 16
PIF_VALUE: 21
PIF_VALUE: 20
PIF_VALUE: 21
PIF_VALUE: 20

## 2023-06-30 ASSESSMENT — PAIN SCALES - GENERAL
PAINLEVEL_OUTOF10: 0

## 2023-07-01 ENCOUNTER — APPOINTMENT (OUTPATIENT)
Dept: GENERAL RADIOLOGY | Age: 78
End: 2023-07-01
Payer: MEDICARE

## 2023-07-01 LAB
ABO + RH BLD: NORMAL
ALBUMIN SERPL-MCNC: 2.5 G/DL (ref 3.5–5.2)
ALP SERPL-CCNC: 69 U/L (ref 35–104)
ALT SERPL-CCNC: 38 U/L (ref 0–32)
ANION GAP SERPL CALCULATED.3IONS-SCNC: 10 MMOL/L (ref 7–16)
ANISOCYTOSIS: ABNORMAL
AST SERPL-CCNC: 62 U/L (ref 0–31)
B.E.: -4 MMOL/L (ref -3–3)
B.E.: -6.2 MMOL/L (ref -3–3)
BASOPHILS # BLD: 0 E9/L (ref 0–0.2)
BASOPHILS NFR BLD: 0 % (ref 0–2)
BILIRUB SERPL-MCNC: 0.3 MG/DL (ref 0–1.2)
BLD GP AB SCN SERPL QL: NORMAL
BLOOD BANK DISPENSE STATUS: NORMAL
BLOOD BANK PRODUCT CODE: NORMAL
BNP BLD-MCNC: 686 PG/ML (ref 0–450)
BPU ID: NORMAL
BUN SERPL-MCNC: 34 MG/DL (ref 6–23)
BURR CELLS: ABNORMAL
CALCIUM SERPL-MCNC: 7.3 MG/DL (ref 8.6–10.2)
CHLORIDE SERPL-SCNC: 115 MMOL/L (ref 98–107)
CO2 SERPL-SCNC: 20 MMOL/L (ref 22–29)
COHB: 1.4 % (ref 0–1.5)
COHB: 1.8 % (ref 0–1.5)
CREAT SERPL-MCNC: 1.3 MG/DL (ref 0.5–1)
CRITICAL: ABNORMAL
CRITICAL: ABNORMAL
DATE ANALYZED: ABNORMAL
DATE ANALYZED: ABNORMAL
DATE OF COLLECTION: ABNORMAL
DATE OF COLLECTION: ABNORMAL
DESCRIPTION BLOOD BANK: NORMAL
EOSINOPHIL # BLD: 0 E9/L (ref 0.05–0.5)
EOSINOPHIL NFR BLD: 0 % (ref 0–6)
ERYTHROCYTE [DISTWIDTH] IN BLOOD BY AUTOMATED COUNT: 16.3 FL (ref 11.5–15)
FIO2: 40 %
FIO2: 40 %
GLUCOSE SERPL-MCNC: 141 MG/DL (ref 74–99)
HCO3: 19.2 MMOL/L (ref 22–26)
HCO3: 21.1 MMOL/L (ref 22–26)
HCT VFR BLD AUTO: 22.4 % (ref 34–48)
HCT VFR BLD AUTO: 27.9 % (ref 34–48)
HGB BLD-MCNC: 6.8 G/DL (ref 11.5–15.5)
HGB BLD-MCNC: 8.5 G/DL (ref 11.5–15.5)
HHB: 0.3 % (ref 0–5)
HHB: 0.5 % (ref 0–5)
HYPOCHROMIA: ABNORMAL
LAB: ABNORMAL
LAB: ABNORMAL
LACTATE BLDV-SCNC: 1.4 MMOL/L (ref 0.5–2.2)
LV EF: 58 %
LVEF MODALITY: NORMAL
LYMPHOCYTES # BLD: 0.45 E9/L (ref 1.5–4)
LYMPHOCYTES NFR BLD: 4.3 % (ref 20–42)
Lab: ABNORMAL
Lab: ABNORMAL
MAGNESIUM SERPL-MCNC: 2.5 MG/DL (ref 1.6–2.6)
MCH RBC QN AUTO: 29.4 PG (ref 26–35)
MCHC RBC AUTO-ENTMCNC: 30.4 % (ref 32–34.5)
MCV RBC AUTO: 97 FL (ref 80–99.9)
METAMYELOCYTES NFR BLD MANUAL: 1.7 % (ref 0–1)
METHB: 0.3 % (ref 0–1.5)
METHB: 0.3 % (ref 0–1.5)
MODE: ABNORMAL
MODE: AC
MONOCYTES # BLD: 0.45 E9/L (ref 0.1–0.95)
MONOCYTES NFR BLD: 3.5 % (ref 2–12)
MRSA SPEC QL CULT: NORMAL
MYELOCYTES NFR BLD MANUAL: 0.9 % (ref 0–0)
NEUTROPHILS # BLD: 10.4 E9/L (ref 1.8–7.3)
NEUTS SEG NFR BLD: 89.6 % (ref 43–80)
NRBC BLD-RTO: 0 /100 WBC
O2 CONTENT: 10.5 ML/DL
O2 CONTENT: 11.8 ML/DL
O2 SATURATION: 99.5 % (ref 92–98.5)
O2 SATURATION: 99.7 % (ref 92–98.5)
O2HB: 97.6 % (ref 94–97)
O2HB: 97.8 % (ref 94–97)
OPERATOR ID: 316
OPERATOR ID: 789
OVALOCYTES: ABNORMAL
PATIENT TEMP: 37 C
PATIENT TEMP: 37 C
PCO2: 37.3 MMHG (ref 35–45)
PCO2: 38.3 MMHG (ref 35–45)
PEEP/CPAP: 5 CMH2O
PEEP/CPAP: 5 CMH2O
PFO2: 3.98 MMHG/%
PFO2: 4.06 MMHG/%
PH BLOOD GAS: 7.33 (ref 7.35–7.45)
PH BLOOD GAS: 7.36 (ref 7.35–7.45)
PHOSPHATE SERPL-MCNC: 3.4 MG/DL (ref 2.5–4.5)
PLATELET # BLD AUTO: 248 E9/L (ref 130–450)
PMV BLD AUTO: 9.9 FL (ref 7–12)
PO2: 159.3 MMHG (ref 75–100)
PO2: 162.5 MMHG (ref 75–100)
POIKILOCYTES: ABNORMAL
POLYCHROMASIA: ABNORMAL
POTASSIUM SERPL-SCNC: 3.5 MMOL/L (ref 3.5–5)
POTASSIUM SERPL-SCNC: 4 MMOL/L (ref 3.5–5)
PROT SERPL-MCNC: 5.3 G/DL (ref 6.4–8.3)
PS: 8 CMH20
RBC # BLD AUTO: 2.31 E12/L (ref 3.5–5.5)
RI(T): 0.49
RI(T): 0.51
RR MECHANICAL: 12 B/MIN
SODIUM SERPL-SCNC: 145 MMOL/L (ref 132–146)
SOURCE, BLOOD GAS: ABNORMAL
SOURCE, BLOOD GAS: ABNORMAL
THB: 7.4 G/DL (ref 11.5–16.5)
THB: 8.3 G/DL (ref 11.5–16.5)
TIME ANALYZED: 445
TIME ANALYZED: 940
VT MECHANICAL: 380 ML
WBC # BLD: 11.3 E9/L (ref 4.5–11.5)

## 2023-07-01 PROCEDURE — 6360000002 HC RX W HCPCS: Performed by: INTERNAL MEDICINE

## 2023-07-01 PROCEDURE — A4216 STERILE WATER/SALINE, 10 ML: HCPCS | Performed by: SURGERY

## 2023-07-01 PROCEDURE — 2700000000 HC OXYGEN THERAPY PER DAY

## 2023-07-01 PROCEDURE — P9016 RBC LEUKOCYTES REDUCED: HCPCS

## 2023-07-01 PROCEDURE — 85014 HEMATOCRIT: CPT

## 2023-07-01 PROCEDURE — 86923 COMPATIBILITY TEST ELECTRIC: CPT

## 2023-07-01 PROCEDURE — 71045 X-RAY EXAM CHEST 1 VIEW: CPT

## 2023-07-01 PROCEDURE — 84132 ASSAY OF SERUM POTASSIUM: CPT

## 2023-07-01 PROCEDURE — 2580000003 HC RX 258: Performed by: SURGERY

## 2023-07-01 PROCEDURE — 83605 ASSAY OF LACTIC ACID: CPT

## 2023-07-01 PROCEDURE — 6370000000 HC RX 637 (ALT 250 FOR IP): Performed by: INTERNAL MEDICINE

## 2023-07-01 PROCEDURE — 83735 ASSAY OF MAGNESIUM: CPT

## 2023-07-01 PROCEDURE — 83880 ASSAY OF NATRIURETIC PEPTIDE: CPT

## 2023-07-01 PROCEDURE — 37799 UNLISTED PX VASCULAR SURGERY: CPT

## 2023-07-01 PROCEDURE — 93306 TTE W/DOPPLER COMPLETE: CPT

## 2023-07-01 PROCEDURE — 36415 COLL VENOUS BLD VENIPUNCTURE: CPT

## 2023-07-01 PROCEDURE — 2500000003 HC RX 250 WO HCPCS: Performed by: INTERNAL MEDICINE

## 2023-07-01 PROCEDURE — 2580000003 HC RX 258: Performed by: NURSE PRACTITIONER

## 2023-07-01 PROCEDURE — 85025 COMPLETE CBC W/AUTO DIFF WBC: CPT

## 2023-07-01 PROCEDURE — 80053 COMPREHEN METABOLIC PANEL: CPT

## 2023-07-01 PROCEDURE — 6360000002 HC RX W HCPCS: Performed by: NURSE PRACTITIONER

## 2023-07-01 PROCEDURE — 2580000003 HC RX 258: Performed by: INTERNAL MEDICINE

## 2023-07-01 PROCEDURE — 86901 BLOOD TYPING SEROLOGIC RH(D): CPT

## 2023-07-01 PROCEDURE — 30233N1 TRANSFUSION OF NONAUTOLOGOUS RED BLOOD CELLS INTO PERIPHERAL VEIN, PERCUTANEOUS APPROACH: ICD-10-PCS | Performed by: INTERNAL MEDICINE

## 2023-07-01 PROCEDURE — 82805 BLOOD GASES W/O2 SATURATION: CPT

## 2023-07-01 PROCEDURE — 84100 ASSAY OF PHOSPHORUS: CPT

## 2023-07-01 PROCEDURE — 86900 BLOOD TYPING SEROLOGIC ABO: CPT

## 2023-07-01 PROCEDURE — 94640 AIRWAY INHALATION TREATMENT: CPT

## 2023-07-01 PROCEDURE — 6360000002 HC RX W HCPCS: Performed by: SURGERY

## 2023-07-01 PROCEDURE — 2060000000 HC ICU INTERMEDIATE R&B

## 2023-07-01 PROCEDURE — 94003 VENT MGMT INPAT SUBQ DAY: CPT

## 2023-07-01 PROCEDURE — 86850 RBC ANTIBODY SCREEN: CPT

## 2023-07-01 PROCEDURE — 85018 HEMOGLOBIN: CPT

## 2023-07-01 PROCEDURE — C9113 INJ PANTOPRAZOLE SODIUM, VIA: HCPCS | Performed by: SURGERY

## 2023-07-01 PROCEDURE — 36430 TRANSFUSION BLD/BLD COMPNT: CPT

## 2023-07-01 RX ORDER — DIGOXIN 0.25 MG/ML
62.5 INJECTION INTRAMUSCULAR; INTRAVENOUS
Status: DISCONTINUED | OUTPATIENT
Start: 2023-07-03 | End: 2023-07-02

## 2023-07-01 RX ORDER — FENTANYL CITRATE 50 UG/ML
25 INJECTION, SOLUTION INTRAMUSCULAR; INTRAVENOUS
Status: DISCONTINUED | OUTPATIENT
Start: 2023-07-01 | End: 2023-07-07

## 2023-07-01 RX ORDER — SODIUM CHLORIDE 9 MG/ML
INJECTION, SOLUTION INTRAVENOUS PRN
Status: DISCONTINUED | OUTPATIENT
Start: 2023-07-01 | End: 2023-07-07 | Stop reason: HOSPADM

## 2023-07-01 RX ORDER — POTASSIUM CHLORIDE 7.45 MG/ML
10 INJECTION INTRAVENOUS
Status: DISPENSED | OUTPATIENT
Start: 2023-07-01 | End: 2023-07-01

## 2023-07-01 RX ADMIN — SODIUM BICARBONATE: 84 INJECTION, SOLUTION INTRAVENOUS at 11:10

## 2023-07-01 RX ADMIN — HYDROMORPHONE HYDROCHLORIDE 0.5 MG: 1 INJECTION, SOLUTION INTRAMUSCULAR; INTRAVENOUS; SUBCUTANEOUS at 06:34

## 2023-07-01 RX ADMIN — ALBUTEROL SULFATE 2.5 MG: 2.5 SOLUTION RESPIRATORY (INHALATION) at 23:48

## 2023-07-01 RX ADMIN — ALBUTEROL SULFATE 2.5 MG: 2.5 SOLUTION RESPIRATORY (INHALATION) at 08:32

## 2023-07-01 RX ADMIN — ARFORMOTEROL TARTRATE 15 MCG: 15 SOLUTION RESPIRATORY (INHALATION) at 08:32

## 2023-07-01 RX ADMIN — ALBUTEROL SULFATE 2.5 MG: 2.5 SOLUTION RESPIRATORY (INHALATION) at 04:05

## 2023-07-01 RX ADMIN — PIPERACILLIN AND TAZOBACTAM 4500 MG: 4; .5 INJECTION, POWDER, LYOPHILIZED, FOR SOLUTION INTRAVENOUS at 20:17

## 2023-07-01 RX ADMIN — POTASSIUM CHLORIDE 10 MEQ: 7.46 INJECTION, SOLUTION INTRAVENOUS at 06:39

## 2023-07-01 RX ADMIN — Medication: at 21:58

## 2023-07-01 RX ADMIN — HEPARIN SODIUM 5000 UNITS: 10000 INJECTION INTRAVENOUS; SUBCUTANEOUS at 06:42

## 2023-07-01 RX ADMIN — ALBUTEROL SULFATE 2.5 MG: 2.5 SOLUTION RESPIRATORY (INHALATION) at 15:33

## 2023-07-01 RX ADMIN — ARFORMOTEROL TARTRATE 15 MCG: 15 SOLUTION RESPIRATORY (INHALATION) at 18:31

## 2023-07-01 RX ADMIN — ALBUTEROL SULFATE 2.5 MG: 2.5 SOLUTION RESPIRATORY (INHALATION) at 11:50

## 2023-07-01 RX ADMIN — Medication: at 11:35

## 2023-07-01 RX ADMIN — PANTOPRAZOLE SODIUM 40 MG: 40 INJECTION, POWDER, FOR SOLUTION INTRAVENOUS at 10:24

## 2023-07-01 RX ADMIN — POTASSIUM CHLORIDE 10 MEQ: 7.46 INJECTION, SOLUTION INTRAVENOUS at 09:57

## 2023-07-01 RX ADMIN — ALBUTEROL SULFATE 2.5 MG: 2.5 SOLUTION RESPIRATORY (INHALATION) at 18:31

## 2023-07-01 RX ADMIN — CHLORHEXIDINE GLUCONATE 0.12% ORAL RINSE 15 ML: 1.2 LIQUID ORAL at 10:25

## 2023-07-01 RX ADMIN — HYDROMORPHONE HYDROCHLORIDE 0.5 MG: 1 INJECTION, SOLUTION INTRAMUSCULAR; INTRAVENOUS; SUBCUTANEOUS at 10:00

## 2023-07-01 RX ADMIN — PIPERACILLIN AND TAZOBACTAM 4500 MG: 4; .5 INJECTION, POWDER, LYOPHILIZED, FOR SOLUTION INTRAVENOUS at 10:24

## 2023-07-01 ASSESSMENT — PULMONARY FUNCTION TESTS
PIF_VALUE: 21
PIF_VALUE: 22
PIF_VALUE: 17
PIF_VALUE: 19
PIF_VALUE: 21
PIF_VALUE: 21
PIF_VALUE: 20
PIF_VALUE: 14
PIF_VALUE: 21

## 2023-07-01 ASSESSMENT — PAIN SCALES - GENERAL
PAINLEVEL_OUTOF10: 0
PAINLEVEL_OUTOF10: 0
PAINLEVEL_OUTOF10: 10
PAINLEVEL_OUTOF10: 0
PAINLEVEL_OUTOF10: 7
PAINLEVEL_OUTOF10: 0

## 2023-07-01 ASSESSMENT — PAIN DESCRIPTION - LOCATION: LOCATION: ABDOMEN

## 2023-07-02 ENCOUNTER — APPOINTMENT (OUTPATIENT)
Dept: GENERAL RADIOLOGY | Age: 78
End: 2023-07-02
Payer: MEDICARE

## 2023-07-02 LAB
ACANTHOCYTES: ABNORMAL
ALBUMIN SERPL-MCNC: 2.5 G/DL (ref 3.5–5.2)
ALP SERPL-CCNC: 121 U/L (ref 35–104)
ALT SERPL-CCNC: 36 U/L (ref 0–32)
ANION GAP SERPL CALCULATED.3IONS-SCNC: 6 MMOL/L (ref 7–16)
ANISOCYTOSIS: ABNORMAL
AST SERPL-CCNC: 48 U/L (ref 0–31)
BASOPHILS # BLD: 0 E9/L (ref 0–0.2)
BASOPHILS NFR BLD: 0 % (ref 0–2)
BILIRUB SERPL-MCNC: 0.3 MG/DL (ref 0–1.2)
BUN SERPL-MCNC: 23 MG/DL (ref 6–23)
BURR CELLS: ABNORMAL
CALCIUM SERPL-MCNC: 7.9 MG/DL (ref 8.6–10.2)
CHLORIDE SERPL-SCNC: 113 MMOL/L (ref 98–107)
CO2 SERPL-SCNC: 31 MMOL/L (ref 22–29)
CREAT SERPL-MCNC: 0.9 MG/DL (ref 0.5–1)
EOSINOPHIL # BLD: 0.11 E9/L (ref 0.05–0.5)
EOSINOPHIL NFR BLD: 0.9 % (ref 0–6)
ERYTHROCYTE [DISTWIDTH] IN BLOOD BY AUTOMATED COUNT: 20.2 FL (ref 11.5–15)
GLUCOSE SERPL-MCNC: 149 MG/DL (ref 74–99)
HCT VFR BLD AUTO: 25.4 % (ref 34–48)
HGB BLD-MCNC: 8 G/DL (ref 11.5–15.5)
HYPOCHROMIA: ABNORMAL
LYMPHOCYTES # BLD: 0.5 E9/L (ref 1.5–4)
LYMPHOCYTES NFR BLD: 4.3 % (ref 20–42)
MAGNESIUM SERPL-MCNC: 2.3 MG/DL (ref 1.6–2.6)
MCH RBC QN AUTO: 28.3 PG (ref 26–35)
MCHC RBC AUTO-ENTMCNC: 31.5 % (ref 32–34.5)
MCV RBC AUTO: 89.8 FL (ref 80–99.9)
MONOCYTES # BLD: 0.37 E9/L (ref 0.1–0.95)
MONOCYTES NFR BLD: 2.6 % (ref 2–12)
NEUTROPHILS # BLD: 11.41 E9/L (ref 1.8–7.3)
NEUTS SEG NFR BLD: 92.2 % (ref 43–80)
NRBC BLD-RTO: 0 /100 WBC
OVALOCYTES: ABNORMAL
PHOSPHATE SERPL-MCNC: 2 MG/DL (ref 2.5–4.5)
PLATELET # BLD AUTO: 241 E9/L (ref 130–450)
PMV BLD AUTO: 9.4 FL (ref 7–12)
POIKILOCYTES: ABNORMAL
POLYCHROMASIA: ABNORMAL
POTASSIUM SERPL-SCNC: 3.7 MMOL/L (ref 3.5–5)
PROT SERPL-MCNC: 5.5 G/DL (ref 6.4–8.3)
RBC # BLD AUTO: 2.83 E12/L (ref 3.5–5.5)
SCHISTOCYTES: ABNORMAL
SODIUM SERPL-SCNC: 150 MMOL/L (ref 132–146)
TARGET CELLS: ABNORMAL
TOXIC GRANULATION: ABNORMAL
VACUOLATED NEUTROPHILS: ABNORMAL
WBC # BLD: 12.4 E9/L (ref 4.5–11.5)

## 2023-07-02 PROCEDURE — 2060000000 HC ICU INTERMEDIATE R&B

## 2023-07-02 PROCEDURE — 2580000003 HC RX 258: Performed by: INTERNAL MEDICINE

## 2023-07-02 PROCEDURE — 71045 X-RAY EXAM CHEST 1 VIEW: CPT

## 2023-07-02 PROCEDURE — 6360000002 HC RX W HCPCS: Performed by: INTERNAL MEDICINE

## 2023-07-02 PROCEDURE — 36415 COLL VENOUS BLD VENIPUNCTURE: CPT

## 2023-07-02 PROCEDURE — 84100 ASSAY OF PHOSPHORUS: CPT

## 2023-07-02 PROCEDURE — 85025 COMPLETE CBC W/AUTO DIFF WBC: CPT

## 2023-07-02 PROCEDURE — 2500000003 HC RX 250 WO HCPCS: Performed by: INTERNAL MEDICINE

## 2023-07-02 PROCEDURE — 83735 ASSAY OF MAGNESIUM: CPT

## 2023-07-02 PROCEDURE — 2700000000 HC OXYGEN THERAPY PER DAY

## 2023-07-02 PROCEDURE — 94640 AIRWAY INHALATION TREATMENT: CPT

## 2023-07-02 PROCEDURE — 94003 VENT MGMT INPAT SUBQ DAY: CPT

## 2023-07-02 PROCEDURE — A4216 STERILE WATER/SALINE, 10 ML: HCPCS | Performed by: INTERNAL MEDICINE

## 2023-07-02 PROCEDURE — C9113 INJ PANTOPRAZOLE SODIUM, VIA: HCPCS | Performed by: INTERNAL MEDICINE

## 2023-07-02 PROCEDURE — 80053 COMPREHEN METABOLIC PANEL: CPT

## 2023-07-02 RX ORDER — ENALAPRILAT 2.5 MG/2ML
1.25 INJECTION INTRAVENOUS EVERY 8 HOURS PRN
Status: DISCONTINUED | OUTPATIENT
Start: 2023-07-02 | End: 2023-07-06

## 2023-07-02 RX ADMIN — HYDROMORPHONE HYDROCHLORIDE 0.5 MG: 1 INJECTION, SOLUTION INTRAMUSCULAR; INTRAVENOUS; SUBCUTANEOUS at 20:34

## 2023-07-02 RX ADMIN — FENTANYL CITRATE 25 MCG: 50 INJECTION, SOLUTION INTRAMUSCULAR; INTRAVENOUS at 09:13

## 2023-07-02 RX ADMIN — ALBUTEROL SULFATE 2.5 MG: 2.5 SOLUTION RESPIRATORY (INHALATION) at 08:01

## 2023-07-02 RX ADMIN — SODIUM CHLORIDE 20 ML: 9 INJECTION, SOLUTION INTRAVENOUS at 20:32

## 2023-07-02 RX ADMIN — Medication 10 ML: at 09:20

## 2023-07-02 RX ADMIN — ALBUTEROL SULFATE 2.5 MG: 2.5 SOLUTION RESPIRATORY (INHALATION) at 15:19

## 2023-07-02 RX ADMIN — HYDROMORPHONE HYDROCHLORIDE 0.5 MG: 1 INJECTION, SOLUTION INTRAMUSCULAR; INTRAVENOUS; SUBCUTANEOUS at 16:19

## 2023-07-02 RX ADMIN — PIPERACILLIN AND TAZOBACTAM 4500 MG: 4; .5 INJECTION, POWDER, LYOPHILIZED, FOR SOLUTION INTRAVENOUS at 09:22

## 2023-07-02 RX ADMIN — ARFORMOTEROL TARTRATE 15 MCG: 15 SOLUTION RESPIRATORY (INHALATION) at 08:01

## 2023-07-02 RX ADMIN — ARFORMOTEROL TARTRATE 15 MCG: 15 SOLUTION RESPIRATORY (INHALATION) at 18:21

## 2023-07-02 RX ADMIN — ALBUTEROL SULFATE 2.5 MG: 2.5 SOLUTION RESPIRATORY (INHALATION) at 11:41

## 2023-07-02 RX ADMIN — Medication 10 ML: at 20:34

## 2023-07-02 RX ADMIN — Medication: at 07:37

## 2023-07-02 RX ADMIN — ENALAPRILAT 1.25 MG: 1.25 INJECTION INTRAVENOUS at 15:11

## 2023-07-02 RX ADMIN — AMIODARONE HYDROCHLORIDE 75 MG: 50 INJECTION, SOLUTION INTRAVENOUS at 12:27

## 2023-07-02 RX ADMIN — ALBUTEROL SULFATE 2.5 MG: 2.5 SOLUTION RESPIRATORY (INHALATION) at 03:24

## 2023-07-02 RX ADMIN — PANTOPRAZOLE SODIUM 40 MG: 40 INJECTION, POWDER, FOR SOLUTION INTRAVENOUS at 09:20

## 2023-07-02 RX ADMIN — ALBUTEROL SULFATE 2.5 MG: 2.5 SOLUTION RESPIRATORY (INHALATION) at 18:21

## 2023-07-02 RX ADMIN — PIPERACILLIN AND TAZOBACTAM 4500 MG: 4; .5 INJECTION, POWDER, LYOPHILIZED, FOR SOLUTION INTRAVENOUS at 20:33

## 2023-07-02 ASSESSMENT — PAIN DESCRIPTION - DESCRIPTORS
DESCRIPTORS: SORE;DISCOMFORT;TENDER
DESCRIPTORS: DISCOMFORT;SORE

## 2023-07-02 ASSESSMENT — PAIN DESCRIPTION - ORIENTATION
ORIENTATION: MID;LOWER
ORIENTATION: LOWER;MID
ORIENTATION: MID
ORIENTATION: MID

## 2023-07-02 ASSESSMENT — PAIN SCALES - GENERAL
PAINLEVEL_OUTOF10: 9
PAINLEVEL_OUTOF10: 9
PAINLEVEL_OUTOF10: 2
PAINLEVEL_OUTOF10: 0
PAINLEVEL_OUTOF10: 2
PAINLEVEL_OUTOF10: 5
PAINLEVEL_OUTOF10: 7

## 2023-07-02 ASSESSMENT — PAIN DESCRIPTION - LOCATION
LOCATION: ABDOMEN
LOCATION: ABDOMEN;BACK
LOCATION: ABDOMEN
LOCATION: ABDOMEN

## 2023-07-02 ASSESSMENT — PAIN - FUNCTIONAL ASSESSMENT: PAIN_FUNCTIONAL_ASSESSMENT: ACTIVITIES ARE NOT PREVENTED

## 2023-07-02 ASSESSMENT — PAIN DESCRIPTION - ONSET: ONSET: ON-GOING

## 2023-07-02 ASSESSMENT — PAIN DESCRIPTION - PAIN TYPE: TYPE: SURGICAL PAIN

## 2023-07-03 ENCOUNTER — APPOINTMENT (OUTPATIENT)
Dept: ULTRASOUND IMAGING | Age: 78
End: 2023-07-03
Payer: MEDICARE

## 2023-07-03 LAB
ALBUMIN SERPL-MCNC: 2.6 G/DL (ref 3.5–5.2)
ALP SERPL-CCNC: 91 U/L (ref 35–104)
ALT SERPL-CCNC: 31 U/L (ref 0–32)
ANION GAP SERPL CALCULATED.3IONS-SCNC: 10 MMOL/L (ref 7–16)
ANION GAP SERPL CALCULATED.3IONS-SCNC: 11 MMOL/L (ref 7–16)
ANISOCYTOSIS: ABNORMAL
AST SERPL-CCNC: 31 U/L (ref 0–31)
BACTERIA FLD AEROBE CULT: ABNORMAL
BACTERIA FLD AEROBE CULT: ABNORMAL
BACTERIA SPEC AEROBE CULT: ABNORMAL
BACTERIA SPEC AEROBE CULT: ABNORMAL
BACTERIA SPEC ANAEROBE CULT: ABNORMAL
BASOPHILS # BLD: 0 E9/L (ref 0–0.2)
BASOPHILS NFR BLD: 0 % (ref 0–2)
BILIRUB SERPL-MCNC: 0.4 MG/DL (ref 0–1.2)
BUN SERPL-MCNC: 16 MG/DL (ref 6–23)
BUN SERPL-MCNC: 16 MG/DL (ref 6–23)
CALCIUM SERPL-MCNC: 8.5 MG/DL (ref 8.6–10.2)
CALCIUM SERPL-MCNC: 8.7 MG/DL (ref 8.6–10.2)
CHLORIDE SERPL-SCNC: 109 MMOL/L (ref 98–107)
CHLORIDE SERPL-SCNC: 111 MMOL/L (ref 98–107)
CO2 SERPL-SCNC: 30 MMOL/L (ref 22–29)
CO2 SERPL-SCNC: 31 MMOL/L (ref 22–29)
CREAT SERPL-MCNC: 0.7 MG/DL (ref 0.5–1)
CREAT SERPL-MCNC: 0.7 MG/DL (ref 0.5–1)
EOSINOPHIL # BLD: 0 E9/L (ref 0.05–0.5)
EOSINOPHIL NFR BLD: 0 % (ref 0–6)
ERYTHROCYTE [DISTWIDTH] IN BLOOD BY AUTOMATED COUNT: 19.4 FL (ref 11.5–15)
GLUCOSE SERPL-MCNC: 74 MG/DL (ref 74–99)
GLUCOSE SERPL-MCNC: 93 MG/DL (ref 74–99)
GRAM STN SPEC: ABNORMAL
HCT VFR BLD AUTO: 30.5 % (ref 34–48)
HGB BLD-MCNC: 9.4 G/DL (ref 11.5–15.5)
HYPOCHROMIA: ABNORMAL
LYMPHOCYTES # BLD: 0.28 E9/L (ref 1.5–4)
LYMPHOCYTES NFR BLD: 1.7 % (ref 20–42)
MAGNESIUM SERPL-MCNC: 2.1 MG/DL (ref 1.6–2.6)
MCH RBC QN AUTO: 28.5 PG (ref 26–35)
MCHC RBC AUTO-ENTMCNC: 30.8 % (ref 32–34.5)
MCV RBC AUTO: 92.4 FL (ref 80–99.9)
METAMYELOCYTES NFR BLD MANUAL: 1.7 % (ref 0–1)
MONOCYTES # BLD: 0.56 E9/L (ref 0.1–0.95)
MONOCYTES NFR BLD: 3.5 % (ref 2–12)
NEUTROPHILS # BLD: 13.4 E9/L (ref 1.8–7.3)
NEUTS SEG NFR BLD: 93.1 % (ref 43–80)
NRBC BLD-RTO: 0 /100 WBC
ORGANISM: ABNORMAL
OVALOCYTES: ABNORMAL
PHOSPHATE SERPL-MCNC: 2.1 MG/DL (ref 2.5–4.5)
PLATELET # BLD AUTO: 273 E9/L (ref 130–450)
PMV BLD AUTO: 9.6 FL (ref 7–12)
POIKILOCYTES: ABNORMAL
POLYCHROMASIA: ABNORMAL
POTASSIUM SERPL-SCNC: 3.5 MMOL/L (ref 3.5–5)
POTASSIUM SERPL-SCNC: 3.6 MMOL/L (ref 3.5–5)
PROT SERPL-MCNC: 5.7 G/DL (ref 6.4–8.3)
RBC # BLD AUTO: 3.3 E12/L (ref 3.5–5.5)
SODIUM SERPL-SCNC: 150 MMOL/L (ref 132–146)
SODIUM SERPL-SCNC: 152 MMOL/L (ref 132–146)
TARGET CELLS: ABNORMAL
WBC # BLD: 14.1 E9/L (ref 4.5–11.5)

## 2023-07-03 PROCEDURE — 36415 COLL VENOUS BLD VENIPUNCTURE: CPT

## 2023-07-03 PROCEDURE — 2580000003 HC RX 258: Performed by: INTERNAL MEDICINE

## 2023-07-03 PROCEDURE — C9113 INJ PANTOPRAZOLE SODIUM, VIA: HCPCS | Performed by: INTERNAL MEDICINE

## 2023-07-03 PROCEDURE — 6370000000 HC RX 637 (ALT 250 FOR IP): Performed by: INTERNAL MEDICINE

## 2023-07-03 PROCEDURE — 6360000002 HC RX W HCPCS: Performed by: INTERNAL MEDICINE

## 2023-07-03 PROCEDURE — 83735 ASSAY OF MAGNESIUM: CPT

## 2023-07-03 PROCEDURE — 2580000003 HC RX 258: Performed by: NURSE PRACTITIONER

## 2023-07-03 PROCEDURE — A4216 STERILE WATER/SALINE, 10 ML: HCPCS | Performed by: INTERNAL MEDICINE

## 2023-07-03 PROCEDURE — 85025 COMPLETE CBC W/AUTO DIFF WBC: CPT

## 2023-07-03 PROCEDURE — 84100 ASSAY OF PHOSPHORUS: CPT

## 2023-07-03 PROCEDURE — 97161 PT EVAL LOW COMPLEX 20 MIN: CPT

## 2023-07-03 PROCEDURE — 80048 BASIC METABOLIC PNL TOTAL CA: CPT

## 2023-07-03 PROCEDURE — 97165 OT EVAL LOW COMPLEX 30 MIN: CPT

## 2023-07-03 PROCEDURE — 80053 COMPREHEN METABOLIC PANEL: CPT

## 2023-07-03 PROCEDURE — 2700000000 HC OXYGEN THERAPY PER DAY

## 2023-07-03 PROCEDURE — 2500000003 HC RX 250 WO HCPCS: Performed by: NURSE PRACTITIONER

## 2023-07-03 PROCEDURE — 93925 LOWER EXTREMITY STUDY: CPT

## 2023-07-03 PROCEDURE — 94640 AIRWAY INHALATION TREATMENT: CPT

## 2023-07-03 PROCEDURE — 2060000000 HC ICU INTERMEDIATE R&B

## 2023-07-03 RX ORDER — DEXTROSE AND SODIUM CHLORIDE 5; .45 G/100ML; G/100ML
INJECTION, SOLUTION INTRAVENOUS CONTINUOUS
Status: DISCONTINUED | OUTPATIENT
Start: 2023-07-03 | End: 2023-07-06

## 2023-07-03 RX ADMIN — ALBUTEROL SULFATE 2.5 MG: 2.5 SOLUTION RESPIRATORY (INHALATION) at 12:52

## 2023-07-03 RX ADMIN — PETROLATUM: 420 OINTMENT TOPICAL at 21:23

## 2023-07-03 RX ADMIN — PIPERACILLIN AND TAZOBACTAM 4500 MG: 4; .5 INJECTION, POWDER, LYOPHILIZED, FOR SOLUTION INTRAVENOUS at 09:09

## 2023-07-03 RX ADMIN — HYDROMORPHONE HYDROCHLORIDE 0.5 MG: 1 INJECTION, SOLUTION INTRAMUSCULAR; INTRAVENOUS; SUBCUTANEOUS at 10:28

## 2023-07-03 RX ADMIN — ALBUTEROL SULFATE 2.5 MG: 2.5 SOLUTION RESPIRATORY (INHALATION) at 09:16

## 2023-07-03 RX ADMIN — DEXTROSE AND SODIUM CHLORIDE: 5; 450 INJECTION, SOLUTION INTRAVENOUS at 23:23

## 2023-07-03 RX ADMIN — AMIODARONE HYDROCHLORIDE 75 MG: 50 INJECTION, SOLUTION INTRAVENOUS at 09:25

## 2023-07-03 RX ADMIN — PIPERACILLIN AND TAZOBACTAM 4500 MG: 4; .5 INJECTION, POWDER, LYOPHILIZED, FOR SOLUTION INTRAVENOUS at 21:26

## 2023-07-03 RX ADMIN — ALBUTEROL SULFATE 2.5 MG: 2.5 SOLUTION RESPIRATORY (INHALATION) at 19:39

## 2023-07-03 RX ADMIN — Medication 10 ML: at 09:06

## 2023-07-03 RX ADMIN — ALBUTEROL SULFATE 2.5 MG: 2.5 SOLUTION RESPIRATORY (INHALATION) at 04:15

## 2023-07-03 RX ADMIN — ARFORMOTEROL TARTRATE 15 MCG: 15 SOLUTION RESPIRATORY (INHALATION) at 09:16

## 2023-07-03 RX ADMIN — PANTOPRAZOLE SODIUM 40 MG: 40 INJECTION, POWDER, FOR SOLUTION INTRAVENOUS at 09:06

## 2023-07-03 RX ADMIN — Medication 10 ML: at 21:23

## 2023-07-03 RX ADMIN — HYDROMORPHONE HYDROCHLORIDE 0.5 MG: 1 INJECTION, SOLUTION INTRAMUSCULAR; INTRAVENOUS; SUBCUTANEOUS at 05:14

## 2023-07-03 RX ADMIN — ARFORMOTEROL TARTRATE 15 MCG: 15 SOLUTION RESPIRATORY (INHALATION) at 19:39

## 2023-07-03 RX ADMIN — POTASSIUM PHOSPHATE, MONOBASIC AND POTASSIUM PHOSPHATE, DIBASIC 20 MMOL: 224; 236 INJECTION, SOLUTION, CONCENTRATE INTRAVENOUS at 14:18

## 2023-07-03 ASSESSMENT — PAIN DESCRIPTION - DESCRIPTORS: DESCRIPTORS: DISCOMFORT

## 2023-07-03 ASSESSMENT — PAIN DESCRIPTION - ORIENTATION: ORIENTATION: MID

## 2023-07-03 ASSESSMENT — PAIN SCALES - GENERAL
PAINLEVEL_OUTOF10: 0

## 2023-07-03 ASSESSMENT — PAIN DESCRIPTION - LOCATION: LOCATION: ABDOMEN

## 2023-07-03 NOTE — CONSULTS
[facility] MD Cande Watkins MD Nicholette Sera, MD Erasmo Hopkins, MD Isabela Wu, DUNG Jc, AMY  Consultation  Patient's Name: Gideon Velazquez  1:20 PM  7/3/2023    Nephrologist: Caned Valero MD    Reason for Consult:  Hypernatremia  Requesting Physician:  Renay Draper DO      History Obtained From: Patient, chart    History of Present Ilness:         Katharina Egan is a 66year old woman that presented to the ED on 6/29/23 with complaints of abdominal pain and distention that was worsening over the past 3-4 days. CT scan revealed diffuse colonic fecal retention with significant stool burden. Fluid distended small loops with transition in the right lower quadrant. Air was identified in the urinary bladder. Labs in the ED showed hypokalemia 3.1 and acute kidney injury -bun-31 and creatinine -1.5 mg/dl. She was intubated and went for emergency surgery for an exploratory laparotomy, sigmoid colectomy, takedown of the splenic flexure, colostomy, wound vac placement. She was sen to the ICU post-op. Infectious disease was consulted for sepsis from peritonitis. Her BUN/creatinine stabilized yesterday,, however, she was hypernatremic-150. Today her labs revealed a sodium of 152, Po4-2.1 and Co2-31. Nephrology was consulted for electrolyte imbalance. She has a PMH that includes:  atrial fib, bilateral hearing loss, COPD, CHF, interstitial pulmonary fibrosis, mitral regurgitation and pulmonary hypertension. She has a NG tube in place , a drain in the right lower quadrant of the abdomen and a colostomy in the left lower quadrant of the abdomen. TPN is to be initiated on her.      Past Medical History:   Diagnosis Date    Atrial fibrillation with RVR ([address]) 06/07/2021    Bilateral hearing loss     Bronchiectasis with acute exacerbation (HCC)     COPD (chronic obstructive pulmonary disease) ([address]) 06/10/2021    Hepatic congestion 06/07/2021    Due

## 2023-07-03 NOTE — CARE COORDINATION
Transition of Care-Patient extubated, remains on 2L of oxygen only. Zosyn continues, ID following,waiting on final peritoneal and blood cultures. Spoke to her daughter Ada Carrera regarding SNF choices-her only choice was [place]-referral made. PT saw, waiting on OT to evaluate. JAMILA, LUZ, transportation envelope completed.      Lucas KNOWLESN, RN  [facility]

## 2023-07-03 NOTE — FLOWSHEET NOTE
Revisit for colostomy irrigation, VAC change, colostomy pouch change, sacral wound  Exploratory laparotomy, sigmoid colectomy, end colostomy, splenic flexure takedown, intraperitoneal drain placement, wound VAC placement     Surgeon(s):  Mary May MD        07/03/23 1555   Colostomy LLQ   Placement Date/Time: 06/30/23 4472   Present on Admission/Arrival: No  Location: LLQ   Stomal Appliance 1 piece; Changed   Flange Size (inches)   (one piece flat with barrier ring)   Stoma  Assessment   (red/ dark red edge)   Peristomal Assessment Intact   Stool Appearance Formed   Output (mL)   (small formed nuggets)   Negative Pressure Wound Therapy Abdomen   Placement Date/Time: 06/30/23 0224   Present on Admission/Arrival: No  Location: Abdomen   $ Standard NPWT <=50 sq cm PER TX $ Yes   Number of pieces used 2   Cycle Continuous   Target Pressure (mmHg) 125   Canister changed? No   Dressing Status New dressing applied   Drainage Amount None   Dressing Change Due 07/05/23     Patient alert and oriented. Aware of colostomy and it means. States she lives near daughter and she will help. Mepilex removed from area . Coccyx, bilateral bottocks 8x8 area of nonblanchable redness, and purple areas as seen above-  Coccyx blister and blister rt buttocks unroofing. Colostomy irrigation given returned at least 15 nuggets of formed stool many times of longer stool and large amount dark brown water. Abd wound VAC changed to incision. Incision pink, minimal depth, 3/4 down most depth of 0.4  Left heel 1.5x1. 5 purple area  Impression: coccyx bilateral buttocks DTI, DTI left heel-neither present on admission  Transfer to floor today  Lo air loss bed  Aquaphor  Heel protectors  Sure prep to heels  Will follow  Brendan Perales.  Wesley Emery, CNS, Wound Care

## 2023-07-03 NOTE — DISCHARGE INSTR - COC
Continuity of Care Form    Patient Name: Shane Howell   :  1945  MRN:  [id number]    Admit date:  2023  Discharge date:  ***    Code Status Order: Full Code   Advance Directives:     Admitting Physician:  Tevin Bowie DO  PCP: Tevin Bowie DO    Discharging Nurse: [address] Unit/Room#: 0206/0206-A  Discharging Unit Phone Number: [phone]    Emergency Contact:   Extended Emergency Contact Information  Primary Emergency Contact: [address] [place] [address]  Mobile Phone: [phone]  Relation: Child    Past Surgical History:  Past Surgical History:   Procedure Laterality Date    FRACTURE SURGERY      right hip    LAPAROTOMY N/A 2023    EXPLORATORY LAPAROTOMY,SIGMOID COLECTOMY,  TAKEDOWN OF SPLENIC FLEXURE, COLOSTOMY, WOUND VAC PLACEMENT performed by Dania Leblanc MD at [address] [place] [address]       Immunization History: There is no immunization history on file for this patient.     Active Problems:  Patient Active Problem List   Diagnosis Code    Intertrochanteric fracture ([address] S72.143A    Irregular cardiac rhythm I49.9    Atrial fibrillation with RVR ([facility]) I48.91    Pulmonary edema cardiac cause ([facility]) I50.1    Hepatic congestion K76.1    Bilateral hearing loss H91.93    Moderate to severe mitral regurgitation L85.8    Systolic and diastolic CHF, acute on chronic ([facility]) I50.43    COPD (chronic obstructive pulmonary disease) ([facility]) J44.9    Pulmonary hypertension ([facility]) I27.20    Bronchiectasis with acute exacerbation ([facility]) J47.1    Interstitial pulmonary fibrosis ([facility]) J84.10    Acute blood loss anemia D62    Small bowel obstruction ([facility]) K56.609    Severe protein-calorie malnutrition ([address]) E43       Isolation/Infection:   Isolation            No Isolation          Patient Infection Status       Infection Onset Added Last Indicated Last Indicated By Review Planned Expiration Resolved Resolved By    None active    Resolved    COVID-19 (Rule Out) 21

## 2023-07-03 NOTE — PATIENT CARE CONFERENCE
Intensive Care Daily Quality Rounding Checklist        ICU Team Members: charge nurse, bedside nurse     ICU Day #: was changed to IM on 7/1/23     Intubation Date: N/A     Ventilator Day #: N/A     Central Line Insertion Date:  N/A                                                    Day #: N/A                                                    Indication: N/A      Arterial Line Insertion Date: N/A                             Day #: N/A     Temporary Hemodialysis Catheter Insertion Date:  N/A                             Day # N/A     DVT Prophylaxis: Heparin on hold, PCDs    GI Prophylaxis: Protonix     Abbott Catheter Insertion Date: June 29                                        Day #: 5                             Indications: Need for fluid volume management in critically ill patient                             Continued need (if yes, reason documented and discussed with physician): Yes, post op     Skin Issues/ Wounds and ordered treatment discussed on rounds: Surgical incision/ wound vac/ NAJMA drain     Goals/ Plans for the Day: Daily labs, advance activity as able, NG tube to LIWS per Surgery, transfer to floor once bed available

## 2023-07-03 NOTE — PLAN OF CARE
Problem: Chronic Conditions and Co-morbidities  Goal: Patient's chronic conditions and co-morbidity symptoms are monitored and maintained or improved  Outcome: Progressing     Problem: Discharge Planning  Goal: Discharge to home or other facility with appropriate resources  Outcome: Progressing     Problem: Pain  Goal: Verbalizes/displays adequate comfort level or baseline comfort level  Outcome: Progressing  Flowsheets (Taken 7/2/2023 2000)  Verbalizes/displays adequate comfort level or baseline comfort level: Encourage patient to monitor pain and request assistance     Problem: Skin/Tissue Integrity  Goal: Absence of new skin breakdown  Description: 1. Monitor for areas of redness and/or skin breakdown  2. Assess vascular access sites hourly  3. Every 4-6 hours minimum:  Change oxygen saturation probe site  4. Every 4-6 hours:  If on nasal continuous positive airway pressure, respiratory therapy assess nares and determine need for appliance change or resting period.   Outcome: Progressing     Problem: Safety - Adult  Goal: Free from fall injury  Outcome: Progressing     Problem: Nutrition Deficit:  Goal: Optimize nutritional status  Outcome: Progressing

## 2023-07-04 LAB
ACANTHOCYTES: ABNORMAL
ALBUMIN SERPL-MCNC: 2.5 G/DL (ref 3.5–5.2)
ALP SERPL-CCNC: 93 U/L (ref 35–104)
ALT SERPL-CCNC: 24 U/L (ref 0–32)
ANION GAP SERPL CALCULATED.3IONS-SCNC: 10 MMOL/L (ref 7–16)
ANION GAP SERPL CALCULATED.3IONS-SCNC: 7 MMOL/L (ref 7–16)
AST SERPL-CCNC: 24 U/L (ref 0–31)
BASOPHILS # BLD: 0.02 E9/L (ref 0–0.2)
BASOPHILS NFR BLD: 0.2 % (ref 0–2)
BILIRUB SERPL-MCNC: 0.4 MG/DL (ref 0–1.2)
BUN SERPL-MCNC: 12 MG/DL (ref 6–23)
BUN SERPL-MCNC: 8 MG/DL (ref 6–23)
CALCIUM SERPL-MCNC: 8.1 MG/DL (ref 8.6–10.2)
CALCIUM SERPL-MCNC: 8.3 MG/DL (ref 8.6–10.2)
CHLORIDE SERPL-SCNC: 107 MMOL/L (ref 98–107)
CHLORIDE SERPL-SCNC: 108 MMOL/L (ref 98–107)
CO2 SERPL-SCNC: 29 MMOL/L (ref 22–29)
CO2 SERPL-SCNC: 31 MMOL/L (ref 22–29)
CREAT SERPL-MCNC: 0.6 MG/DL (ref 0.5–1)
CREAT SERPL-MCNC: 0.7 MG/DL (ref 0.5–1)
EOSINOPHIL # BLD: 0.01 E9/L (ref 0.05–0.5)
EOSINOPHIL NFR BLD: 0.1 % (ref 0–6)
ERYTHROCYTE [DISTWIDTH] IN BLOOD BY AUTOMATED COUNT: 18.8 FL (ref 11.5–15)
GLUCOSE SERPL-MCNC: 128 MG/DL (ref 74–99)
GLUCOSE SERPL-MCNC: 172 MG/DL (ref 74–99)
HCT VFR BLD AUTO: 27.7 % (ref 34–48)
HGB BLD-MCNC: 8.4 G/DL (ref 11.5–15.5)
HYPOCHROMIA: ABNORMAL
IMM GRANULOCYTES # BLD: 0.11 E9/L
IMM GRANULOCYTES NFR BLD: 1.1 % (ref 0–5)
LYMPHOCYTES # BLD: 0.32 E9/L (ref 1.5–4)
LYMPHOCYTES NFR BLD: 3.1 % (ref 20–42)
MAGNESIUM SERPL-MCNC: 1.8 MG/DL (ref 1.6–2.6)
MCH RBC QN AUTO: 28.4 PG (ref 26–35)
MCHC RBC AUTO-ENTMCNC: 30.3 % (ref 32–34.5)
MCV RBC AUTO: 93.6 FL (ref 80–99.9)
MONOCYTES # BLD: 0.51 E9/L (ref 0.1–0.95)
MONOCYTES NFR BLD: 4.9 % (ref 2–12)
NEUTROPHILS # BLD: 9.5 E9/L (ref 1.8–7.3)
NEUTS SEG NFR BLD: 90.6 % (ref 43–80)
OVALOCYTES: ABNORMAL
PHOSPHATE SERPL-MCNC: 1.6 MG/DL (ref 2.5–4.5)
PHOSPHATE SERPL-MCNC: 2.3 MG/DL (ref 2.5–4.5)
PLATELET # BLD AUTO: 269 E9/L (ref 130–450)
PMV BLD AUTO: 9.6 FL (ref 7–12)
POIKILOCYTES: ABNORMAL
POLYCHROMASIA: ABNORMAL
POTASSIUM SERPL-SCNC: 2.8 MMOL/L (ref 3.5–5)
POTASSIUM SERPL-SCNC: 3.2 MMOL/L (ref 3.5–5)
PROT SERPL-MCNC: 5.4 G/DL (ref 6.4–8.3)
RBC # BLD AUTO: 2.96 E12/L (ref 3.5–5.5)
SODIUM SERPL-SCNC: 145 MMOL/L (ref 132–146)
SODIUM SERPL-SCNC: 147 MMOL/L (ref 132–146)
WBC # BLD: 10.5 E9/L (ref 4.5–11.5)

## 2023-07-04 PROCEDURE — 6370000000 HC RX 637 (ALT 250 FOR IP)

## 2023-07-04 PROCEDURE — 2580000003 HC RX 258: Performed by: INTERNAL MEDICINE

## 2023-07-04 PROCEDURE — 2500000003 HC RX 250 WO HCPCS: Performed by: INTERNAL MEDICINE

## 2023-07-04 PROCEDURE — 6360000002 HC RX W HCPCS: Performed by: INTERNAL MEDICINE

## 2023-07-04 PROCEDURE — 2700000000 HC OXYGEN THERAPY PER DAY

## 2023-07-04 PROCEDURE — 36415 COLL VENOUS BLD VENIPUNCTURE: CPT

## 2023-07-04 PROCEDURE — A4216 STERILE WATER/SALINE, 10 ML: HCPCS | Performed by: INTERNAL MEDICINE

## 2023-07-04 PROCEDURE — 84100 ASSAY OF PHOSPHORUS: CPT

## 2023-07-04 PROCEDURE — 94640 AIRWAY INHALATION TREATMENT: CPT

## 2023-07-04 PROCEDURE — C9113 INJ PANTOPRAZOLE SODIUM, VIA: HCPCS | Performed by: INTERNAL MEDICINE

## 2023-07-04 PROCEDURE — 83735 ASSAY OF MAGNESIUM: CPT

## 2023-07-04 PROCEDURE — 80048 BASIC METABOLIC PNL TOTAL CA: CPT

## 2023-07-04 PROCEDURE — 85025 COMPLETE CBC W/AUTO DIFF WBC: CPT

## 2023-07-04 PROCEDURE — 80053 COMPREHEN METABOLIC PANEL: CPT

## 2023-07-04 PROCEDURE — 2060000000 HC ICU INTERMEDIATE R&B

## 2023-07-04 RX ORDER — MAGNESIUM SULFATE IN WATER 40 MG/ML
2000 INJECTION, SOLUTION INTRAVENOUS ONCE
Status: COMPLETED | OUTPATIENT
Start: 2023-07-04 | End: 2023-07-04

## 2023-07-04 RX ORDER — POTASSIUM CHLORIDE 20 MEQ/1
40 TABLET, EXTENDED RELEASE ORAL ONCE
Status: COMPLETED | OUTPATIENT
Start: 2023-07-04 | End: 2023-07-04

## 2023-07-04 RX ORDER — POTASSIUM CHLORIDE 20 MEQ/1
TABLET, EXTENDED RELEASE ORAL
Status: COMPLETED
Start: 2023-07-04 | End: 2023-07-04

## 2023-07-04 RX ADMIN — MAGNESIUM SULFATE HEPTAHYDRATE 2000 MG: 40 INJECTION, SOLUTION INTRAVENOUS at 17:23

## 2023-07-04 RX ADMIN — ALBUTEROL SULFATE 2.5 MG: 2.5 SOLUTION RESPIRATORY (INHALATION) at 20:19

## 2023-07-04 RX ADMIN — ARFORMOTEROL TARTRATE 15 MCG: 15 SOLUTION RESPIRATORY (INHALATION) at 20:19

## 2023-07-04 RX ADMIN — POTASSIUM CHLORIDE 20 MEQ: 1500 TABLET, EXTENDED RELEASE ORAL at 22:25

## 2023-07-04 RX ADMIN — Medication 10 ML: at 11:29

## 2023-07-04 RX ADMIN — POTASSIUM CHLORIDE 20 MEQ: 20 TABLET, EXTENDED RELEASE ORAL at 22:25

## 2023-07-04 RX ADMIN — PANTOPRAZOLE SODIUM 40 MG: 40 INJECTION, POWDER, FOR SOLUTION INTRAVENOUS at 11:28

## 2023-07-04 RX ADMIN — HYDROMORPHONE HYDROCHLORIDE 0.5 MG: 1 INJECTION, SOLUTION INTRAMUSCULAR; INTRAVENOUS; SUBCUTANEOUS at 11:30

## 2023-07-04 RX ADMIN — POTASSIUM PHOSPHATE, MONOBASIC AND POTASSIUM PHOSPHATE, DIBASIC 20 MMOL: 224; 236 INJECTION, SOLUTION, CONCENTRATE INTRAVENOUS at 19:24

## 2023-07-04 RX ADMIN — HYDROMORPHONE HYDROCHLORIDE 0.5 MG: 1 INJECTION, SOLUTION INTRAMUSCULAR; INTRAVENOUS; SUBCUTANEOUS at 20:01

## 2023-07-04 RX ADMIN — PIPERACILLIN AND TAZOBACTAM 4500 MG: 4; .5 INJECTION, POWDER, LYOPHILIZED, FOR SOLUTION INTRAVENOUS at 11:29

## 2023-07-04 RX ADMIN — PIPERACILLIN AND TAZOBACTAM 4500 MG: 4; .5 INJECTION, POWDER, LYOPHILIZED, FOR SOLUTION INTRAVENOUS at 22:40

## 2023-07-04 RX ADMIN — ALBUTEROL SULFATE 2.5 MG: 2.5 SOLUTION RESPIRATORY (INHALATION) at 12:15

## 2023-07-04 RX ADMIN — PETROLATUM: 420 OINTMENT TOPICAL at 20:01

## 2023-07-04 RX ADMIN — AMIODARONE HYDROCHLORIDE 75 MG: 50 INJECTION, SOLUTION INTRAVENOUS at 14:33

## 2023-07-04 RX ADMIN — PETROLATUM: 420 OINTMENT TOPICAL at 11:32

## 2023-07-04 RX ADMIN — Medication 10 ML: at 20:02

## 2023-07-04 RX ADMIN — ARFORMOTEROL TARTRATE 15 MCG: 15 SOLUTION RESPIRATORY (INHALATION) at 12:15

## 2023-07-04 RX ADMIN — ALBUTEROL SULFATE 2.5 MG: 2.5 SOLUTION RESPIRATORY (INHALATION) at 16:06

## 2023-07-04 ASSESSMENT — PAIN DESCRIPTION - LOCATION: LOCATION: ABDOMEN

## 2023-07-04 ASSESSMENT — PAIN SCALES - GENERAL
PAINLEVEL_OUTOF10: 0
PAINLEVEL_OUTOF10: 8

## 2023-07-04 ASSESSMENT — PAIN DESCRIPTION - DESCRIPTORS: DESCRIPTORS: SORE

## 2023-07-04 ASSESSMENT — PAIN DESCRIPTION - ORIENTATION: ORIENTATION: MID

## 2023-07-05 LAB
ACANTHOCYTES: ABNORMAL
ACID FAST STN SPEC QL: NORMAL
ALBUMIN SERPL-MCNC: 2.3 G/DL (ref 3.5–5.2)
ALP SERPL-CCNC: 80 U/L (ref 35–104)
ALT SERPL-CCNC: 19 U/L (ref 0–32)
ANION GAP SERPL CALCULATED.3IONS-SCNC: 8 MMOL/L (ref 7–16)
ANISOCYTOSIS: ABNORMAL
AST SERPL-CCNC: 16 U/L (ref 0–31)
BACTERIA BLD CULT ORG #2: NORMAL
BACTERIA BLD CULT: NORMAL
BASOPHILS # BLD: 0 E9/L (ref 0–0.2)
BASOPHILS NFR BLD: 0 % (ref 0–2)
BILIRUB SERPL-MCNC: 0.3 MG/DL (ref 0–1.2)
BUN SERPL-MCNC: 6 MG/DL (ref 6–23)
CALCIUM SERPL-MCNC: 7.9 MG/DL (ref 8.6–10.2)
CHLORIDE SERPL-SCNC: 106 MMOL/L (ref 98–107)
CO2 SERPL-SCNC: 30 MMOL/L (ref 22–29)
CREAT SERPL-MCNC: 0.6 MG/DL (ref 0.5–1)
EOSINOPHIL # BLD: 0.21 E9/L (ref 0.05–0.5)
EOSINOPHIL NFR BLD: 2.6 % (ref 0–6)
ERYTHROCYTE [DISTWIDTH] IN BLOOD BY AUTOMATED COUNT: 18.3 FL (ref 11.5–15)
GLUCOSE SERPL-MCNC: 127 MG/DL (ref 74–99)
HCT VFR BLD AUTO: 26.4 % (ref 34–48)
HGB BLD-MCNC: 8 G/DL (ref 11.5–15.5)
HYPOCHROMIA: ABNORMAL
LOEFFLER MB STN SPEC: NORMAL
LYMPHOCYTES # BLD: 0.33 E9/L (ref 1.5–4)
LYMPHOCYTES NFR BLD: 4.4 % (ref 20–42)
MAGNESIUM SERPL-MCNC: 2 MG/DL (ref 1.6–2.6)
MCH RBC QN AUTO: 28.2 PG (ref 26–35)
MCHC RBC AUTO-ENTMCNC: 30.3 % (ref 32–34.5)
MCV RBC AUTO: 93 FL (ref 80–99.9)
MONOCYTES # BLD: 0.33 E9/L (ref 0.1–0.95)
MONOCYTES NFR BLD: 4.3 % (ref 2–12)
NEUTROPHILS # BLD: 7.3 E9/L (ref 1.8–7.3)
NEUTS SEG NFR BLD: 88.7 % (ref 43–80)
NRBC BLD-RTO: 0 /100 WBC
PHOSPHATE SERPL-MCNC: 3.4 MG/DL (ref 2.5–4.5)
PLATELET # BLD AUTO: 278 E9/L (ref 130–450)
PMV BLD AUTO: 9.6 FL (ref 7–12)
POIKILOCYTES: ABNORMAL
POLYCHROMASIA: ABNORMAL
POTASSIUM SERPL-SCNC: 3.3 MMOL/L (ref 3.5–5)
PROT SERPL-MCNC: 5.2 G/DL (ref 6.4–8.3)
RBC # BLD AUTO: 2.84 E12/L (ref 3.5–5.5)
SODIUM SERPL-SCNC: 144 MMOL/L (ref 132–146)
WBC # BLD: 8.2 E9/L (ref 4.5–11.5)

## 2023-07-05 PROCEDURE — 94640 AIRWAY INHALATION TREATMENT: CPT

## 2023-07-05 PROCEDURE — 2700000000 HC OXYGEN THERAPY PER DAY

## 2023-07-05 PROCEDURE — 6360000002 HC RX W HCPCS: Performed by: INTERNAL MEDICINE

## 2023-07-05 PROCEDURE — 2060000000 HC ICU INTERMEDIATE R&B

## 2023-07-05 PROCEDURE — 2580000003 HC RX 258: Performed by: INTERNAL MEDICINE

## 2023-07-05 PROCEDURE — 80053 COMPREHEN METABOLIC PANEL: CPT

## 2023-07-05 PROCEDURE — 84100 ASSAY OF PHOSPHORUS: CPT

## 2023-07-05 PROCEDURE — 2500000003 HC RX 250 WO HCPCS: Performed by: INTERNAL MEDICINE

## 2023-07-05 PROCEDURE — 85025 COMPLETE CBC W/AUTO DIFF WBC: CPT

## 2023-07-05 PROCEDURE — 97535 SELF CARE MNGMENT TRAINING: CPT

## 2023-07-05 PROCEDURE — A4216 STERILE WATER/SALINE, 10 ML: HCPCS | Performed by: INTERNAL MEDICINE

## 2023-07-05 PROCEDURE — 6370000000 HC RX 637 (ALT 250 FOR IP): Performed by: NURSE PRACTITIONER

## 2023-07-05 PROCEDURE — 2580000003 HC RX 258: Performed by: STUDENT IN AN ORGANIZED HEALTH CARE EDUCATION/TRAINING PROGRAM

## 2023-07-05 PROCEDURE — 6360000002 HC RX W HCPCS: Performed by: STUDENT IN AN ORGANIZED HEALTH CARE EDUCATION/TRAINING PROGRAM

## 2023-07-05 PROCEDURE — 36415 COLL VENOUS BLD VENIPUNCTURE: CPT

## 2023-07-05 PROCEDURE — C9113 INJ PANTOPRAZOLE SODIUM, VIA: HCPCS | Performed by: INTERNAL MEDICINE

## 2023-07-05 PROCEDURE — 97530 THERAPEUTIC ACTIVITIES: CPT

## 2023-07-05 PROCEDURE — 83735 ASSAY OF MAGNESIUM: CPT

## 2023-07-05 RX ORDER — HEPARIN SODIUM 100 [USP'U]/ML
1 INJECTION, SOLUTION INTRAVENOUS EVERY 12 HOURS SCHEDULED
Status: DISCONTINUED | OUTPATIENT
Start: 2023-07-05 | End: 2023-07-07 | Stop reason: HOSPADM

## 2023-07-05 RX ORDER — SODIUM CHLORIDE 0.9 % (FLUSH) 0.9 %
5-40 SYRINGE (ML) INJECTION EVERY 12 HOURS SCHEDULED
Status: DISCONTINUED | OUTPATIENT
Start: 2023-07-05 | End: 2023-07-07 | Stop reason: HOSPADM

## 2023-07-05 RX ORDER — SODIUM CHLORIDE 0.9 % (FLUSH) 0.9 %
5-40 SYRINGE (ML) INJECTION PRN
Status: DISCONTINUED | OUTPATIENT
Start: 2023-07-05 | End: 2023-07-07 | Stop reason: HOSPADM

## 2023-07-05 RX ORDER — SODIUM CHLORIDE 9 MG/ML
INJECTION, SOLUTION INTRAVENOUS PRN
Status: DISCONTINUED | OUTPATIENT
Start: 2023-07-05 | End: 2023-07-07 | Stop reason: HOSPADM

## 2023-07-05 RX ORDER — POTASSIUM CHLORIDE 20 MEQ/1
20 TABLET, EXTENDED RELEASE ORAL ONCE
Status: COMPLETED | OUTPATIENT
Start: 2023-07-05 | End: 2023-07-05

## 2023-07-05 RX ORDER — HEPARIN SODIUM 100 [USP'U]/ML
1 INJECTION, SOLUTION INTRAVENOUS PRN
Status: DISCONTINUED | OUTPATIENT
Start: 2023-07-05 | End: 2023-07-07 | Stop reason: HOSPADM

## 2023-07-05 RX ADMIN — Medication 10 ML: at 09:19

## 2023-07-05 RX ADMIN — PANTOPRAZOLE SODIUM 40 MG: 40 INJECTION, POWDER, FOR SOLUTION INTRAVENOUS at 09:19

## 2023-07-05 RX ADMIN — ALBUTEROL SULFATE 2.5 MG: 2.5 SOLUTION RESPIRATORY (INHALATION) at 11:17

## 2023-07-05 RX ADMIN — PETROLATUM: 420 OINTMENT TOPICAL at 20:33

## 2023-07-05 RX ADMIN — ALBUTEROL SULFATE 2.5 MG: 2.5 SOLUTION RESPIRATORY (INHALATION) at 15:50

## 2023-07-05 RX ADMIN — HYDROMORPHONE HYDROCHLORIDE 0.5 MG: 1 INJECTION, SOLUTION INTRAMUSCULAR; INTRAVENOUS; SUBCUTANEOUS at 06:02

## 2023-07-05 RX ADMIN — ALBUTEROL SULFATE 2.5 MG: 2.5 SOLUTION RESPIRATORY (INHALATION) at 21:38

## 2023-07-05 RX ADMIN — PETROLATUM: 420 OINTMENT TOPICAL at 10:10

## 2023-07-05 RX ADMIN — HYDROMORPHONE HYDROCHLORIDE 0.5 MG: 1 INJECTION, SOLUTION INTRAMUSCULAR; INTRAVENOUS; SUBCUTANEOUS at 16:19

## 2023-07-05 RX ADMIN — ALBUTEROL SULFATE 2.5 MG: 2.5 SOLUTION RESPIRATORY (INHALATION) at 08:22

## 2023-07-05 RX ADMIN — AMIODARONE HYDROCHLORIDE 75 MG: 50 INJECTION, SOLUTION INTRAVENOUS at 10:06

## 2023-07-05 RX ADMIN — ARFORMOTEROL TARTRATE 15 MCG: 15 SOLUTION RESPIRATORY (INHALATION) at 08:22

## 2023-07-05 RX ADMIN — POTASSIUM CHLORIDE 20 MEQ: 1500 TABLET, EXTENDED RELEASE ORAL at 11:43

## 2023-07-05 RX ADMIN — SODIUM CHLORIDE, PRESERVATIVE FREE 10 ML: 5 INJECTION INTRAVENOUS at 10:40

## 2023-07-05 RX ADMIN — PIPERACILLIN AND TAZOBACTAM 3375 MG: 3; .375 INJECTION, POWDER, LYOPHILIZED, FOR SOLUTION INTRAVENOUS at 10:48

## 2023-07-05 RX ADMIN — POTASSIUM PHOSPHATE, MONOBASIC AND POTASSIUM PHOSPHATE, DIBASIC 30 MMOL: 224; 236 INJECTION, SOLUTION, CONCENTRATE INTRAVENOUS at 03:21

## 2023-07-05 RX ADMIN — ARFORMOTEROL TARTRATE 15 MCG: 15 SOLUTION RESPIRATORY (INHALATION) at 21:38

## 2023-07-05 RX ADMIN — AMPICILLIN SODIUM AND SULBACTAM SODIUM 3000 MG: 2; 1 INJECTION, POWDER, FOR SOLUTION INTRAMUSCULAR; INTRAVENOUS at 17:03

## 2023-07-05 RX ADMIN — DEXTROSE AND SODIUM CHLORIDE: 5; 450 INJECTION, SOLUTION INTRAVENOUS at 15:01

## 2023-07-05 RX ADMIN — HYDROMORPHONE HYDROCHLORIDE 0.5 MG: 1 INJECTION, SOLUTION INTRAMUSCULAR; INTRAVENOUS; SUBCUTANEOUS at 14:07

## 2023-07-05 RX ADMIN — AMPICILLIN SODIUM AND SULBACTAM SODIUM 3000 MG: 2; 1 INJECTION, POWDER, FOR SOLUTION INTRAMUSCULAR; INTRAVENOUS at 23:30

## 2023-07-05 ASSESSMENT — PAIN DESCRIPTION - ORIENTATION
ORIENTATION: RIGHT;LEFT;MID
ORIENTATION: RIGHT;LEFT;MID
ORIENTATION: RIGHT;LEFT

## 2023-07-05 ASSESSMENT — PAIN SCALES - GENERAL
PAINLEVEL_OUTOF10: 0
PAINLEVEL_OUTOF10: 6
PAINLEVEL_OUTOF10: 7
PAINLEVEL_OUTOF10: 0
PAINLEVEL_OUTOF10: 2
PAINLEVEL_OUTOF10: 8

## 2023-07-05 ASSESSMENT — PAIN - FUNCTIONAL ASSESSMENT
PAIN_FUNCTIONAL_ASSESSMENT: PREVENTS OR INTERFERES SOME ACTIVE ACTIVITIES AND ADLS
PAIN_FUNCTIONAL_ASSESSMENT: PREVENTS OR INTERFERES SOME ACTIVE ACTIVITIES AND ADLS

## 2023-07-05 ASSESSMENT — PAIN DESCRIPTION - DESCRIPTORS
DESCRIPTORS: ACHING;JABBING
DESCRIPTORS: ACHING

## 2023-07-05 ASSESSMENT — PAIN DESCRIPTION - LOCATION
LOCATION: ABDOMEN

## 2023-07-05 NOTE — FLOWSHEET NOTE
Inpatient Wound Care    Admit Date: 6/29/2023  7:46 PM    Reason for consult:  Vac Abd, colostomy. Significant history:  New colostomy s/p Jain's    Wound history:  POA    Findings:     07/05/23 1756   Negative Pressure Wound Therapy Abdomen   Placement Date/Time: 06/30/23 0224   Present on Admission/Arrival: No  Location: Abdomen   $ Standard NPWT <=50 sq cm PER TX $ Yes   Wound Type Surgical   Unit Type KCI   Dressing Type Black Foam   Number of pieces used 1   Number of pieces removed 1   Cycle Continuous   Target Pressure (mmHg) 125   Intensity 4   Dressing Status New dressing applied   Dressing Changed Changed/New   Drainage Amount Small   Drainage Description Serosanguinous   Dressing Change Due 07/07/23   Wound Assessment   (red)   Gay-wound Assessment Intact   Odor None   Incision 06/30/23 Abdomen Mid   Date First Assessed/Time First Assessed: 06/30/23 0122   Present on Hospital Admission: No  Location: Abdomen  Incision Location Orientation: Mid   Wound Image    Dressing Status New dressing applied   Dressing Change Due 07/07/23   Incision Cleansed Cleansed with saline   Dressing/Treatment Negative pressure wound therapy   Incision Length (cm) 19   Incision Width (cm) 1.8 cm   Incision Depth (cm) 0.4 cm   Drainage Amount Small   Drainage Description Serosanguinous   Odor None   Gay-incision Assessment Intact         Impression:  Abd wound vac , colostomy    Interventions in place:  Pt was premedicated for pain. The vac dressing was soaked off using NSS. New dressing applied using black granulofoam . Opsite applied and suction maintained at 125 mmHg. Colostomy lesson done with Pt's daughter using Coloplast 1 pc with a ring. The stoma is 45 mm red, round and raised. Output is loose brown. Reviewed the information folder. Daughter is asking appropriate questions. Plan:Cont current POC.    **Informed Consent**    The patient has given verbal consent to have photos taken of Abd and inserted into

## 2023-07-05 NOTE — CARE COORDINATION
Transition of Care-Per General Surgery-advance diet to clears,clamp NGT today. Pre-cert to [place] pending, submitted 7/3. LUZ MARINO completed.     Shaneka Meneses BSN, RN  [facility]

## 2023-07-05 NOTE — PLAN OF CARE

## 2023-07-05 NOTE — PLAN OF CARE
Problem: Chronic Conditions and Co-morbidities  Goal: Patient's chronic conditions and co-morbidity symptoms are monitored and maintained or improved  7/5/2023 1433 by Alexandria Graff  Outcome: Progressing  7/5/2023 1431 by Alexandria Graff  Outcome: Progressing     Problem: Discharge Planning  Goal: Discharge to home or other facility with appropriate resources  7/5/2023 1433 by Alexandria Graff  Outcome: Progressing  7/5/2023 1431 by Alexandria Graff  Outcome: Progressing     Problem: Pain  Goal: Verbalizes/displays adequate comfort level or baseline comfort level  7/5/2023 1433 by Alexandria Graff  Outcome: Progressing  7/5/2023 1431 by Alexandria Graff  Outcome: Progressing     Problem: Skin/Tissue Integrity  Goal: Absence of new skin breakdown  Description: 1. Monitor for areas of redness and/or skin breakdown  2. Assess vascular access sites hourly  3. Every 4-6 hours minimum:  Change oxygen saturation probe site  4. Every 4-6 hours:  If on nasal continuous positive airway pressure, respiratory therapy assess nares and determine need for appliance change or resting period.   7/5/2023 1433 by Alexandria Graff  Outcome: Progressing  7/5/2023 1431 by Alexandria Graff  Outcome: Progressing     Problem: Safety - Adult  Goal: Free from fall injury  7/5/2023 1433 by Alexandria Graff  Outcome: Progressing  7/5/2023 1431 by Alexandria Graff  Outcome: Progressing     Problem: Nutrition Deficit:  Goal: Optimize nutritional status  7/5/2023 1433 by Alexandria Graff  Outcome: Progressing  7/5/2023 1431 by Alexandria Graff  Outcome: Progressing     Problem: ABCDS Injury Assessment  Goal: Absence of physical injury  7/5/2023 1433 by Alexandria Graff  Outcome: Progressing  7/5/2023 1431 by Alexandria Graff  Outcome: Progressing

## 2023-07-06 LAB
ALBUMIN SERPL-MCNC: 2.3 G/DL (ref 3.5–5.2)
ALP SERPL-CCNC: 85 U/L (ref 35–104)
ALT SERPL-CCNC: 18 U/L (ref 0–32)
ANION GAP SERPL CALCULATED.3IONS-SCNC: 6 MMOL/L (ref 7–16)
ANISOCYTOSIS: ABNORMAL
AST SERPL-CCNC: 16 U/L (ref 0–31)
BASOPHILS # BLD: 0.01 E9/L (ref 0–0.2)
BASOPHILS NFR BLD: 0.1 % (ref 0–2)
BILIRUB SERPL-MCNC: 0.3 MG/DL (ref 0–1.2)
BUN SERPL-MCNC: 3 MG/DL (ref 6–23)
BURR CELLS: ABNORMAL
CALCIUM SERPL-MCNC: 8.2 MG/DL (ref 8.6–10.2)
CHLORIDE SERPL-SCNC: 106 MMOL/L (ref 98–107)
CO2 SERPL-SCNC: 30 MMOL/L (ref 22–29)
CREAT SERPL-MCNC: 0.5 MG/DL (ref 0.5–1)
EOSINOPHIL # BLD: 0.17 E9/L (ref 0.05–0.5)
EOSINOPHIL NFR BLD: 1.5 % (ref 0–6)
ERYTHROCYTE [DISTWIDTH] IN BLOOD BY AUTOMATED COUNT: 17.7 FL (ref 11.5–15)
GLUCOSE SERPL-MCNC: 100 MG/DL (ref 74–99)
HCT VFR BLD AUTO: 28.6 % (ref 34–48)
HGB BLD-MCNC: 8.5 G/DL (ref 11.5–15.5)
HYPOCHROMIA: ABNORMAL
IMM GRANULOCYTES # BLD: 0.1 E9/L
IMM GRANULOCYTES NFR BLD: 0.9 % (ref 0–5)
LYMPHOCYTES # BLD: 0.47 E9/L (ref 1.5–4)
LYMPHOCYTES NFR BLD: 4.3 % (ref 20–42)
MAGNESIUM SERPL-MCNC: 1.6 MG/DL (ref 1.6–2.6)
MCH RBC QN AUTO: 28 PG (ref 26–35)
MCHC RBC AUTO-ENTMCNC: 29.7 % (ref 32–34.5)
MCV RBC AUTO: 94.1 FL (ref 80–99.9)
MONOCYTES # BLD: 0.75 E9/L (ref 0.1–0.95)
MONOCYTES NFR BLD: 6.8 % (ref 2–12)
NEUTROPHILS # BLD: 9.54 E9/L (ref 1.8–7.3)
NEUTS SEG NFR BLD: 86.4 % (ref 43–80)
OVALOCYTES: ABNORMAL
PHOSPHATE SERPL-MCNC: 2.2 MG/DL (ref 2.5–4.5)
PLATELET # BLD AUTO: 300 E9/L (ref 130–450)
PMV BLD AUTO: 9.4 FL (ref 7–12)
POIKILOCYTES: ABNORMAL
POTASSIUM SERPL-SCNC: 3.4 MMOL/L (ref 3.5–5)
PROT SERPL-MCNC: 5.3 G/DL (ref 6.4–8.3)
RBC # BLD AUTO: 3.04 E12/L (ref 3.5–5.5)
SODIUM SERPL-SCNC: 142 MMOL/L (ref 132–146)
WBC # BLD: 11 E9/L (ref 4.5–11.5)

## 2023-07-06 PROCEDURE — 84100 ASSAY OF PHOSPHORUS: CPT

## 2023-07-06 PROCEDURE — 6360000002 HC RX W HCPCS: Performed by: INTERNAL MEDICINE

## 2023-07-06 PROCEDURE — 83735 ASSAY OF MAGNESIUM: CPT

## 2023-07-06 PROCEDURE — 94640 AIRWAY INHALATION TREATMENT: CPT

## 2023-07-06 PROCEDURE — 85025 COMPLETE CBC W/AUTO DIFF WBC: CPT

## 2023-07-06 PROCEDURE — 36415 COLL VENOUS BLD VENIPUNCTURE: CPT

## 2023-07-06 PROCEDURE — 6370000000 HC RX 637 (ALT 250 FOR IP): Performed by: INTERNAL MEDICINE

## 2023-07-06 PROCEDURE — 2700000000 HC OXYGEN THERAPY PER DAY

## 2023-07-06 PROCEDURE — 97530 THERAPEUTIC ACTIVITIES: CPT

## 2023-07-06 PROCEDURE — 05HB33Z INSERTION OF INFUSION DEVICE INTO RIGHT BASILIC VEIN, PERCUTANEOUS APPROACH: ICD-10-PCS | Performed by: INTERNAL MEDICINE

## 2023-07-06 PROCEDURE — 76937 US GUIDE VASCULAR ACCESS: CPT

## 2023-07-06 PROCEDURE — 2580000003 HC RX 258: Performed by: INTERNAL MEDICINE

## 2023-07-06 PROCEDURE — 2060000000 HC ICU INTERMEDIATE R&B

## 2023-07-06 PROCEDURE — C1751 CATH, INF, PER/CENT/MIDLINE: HCPCS

## 2023-07-06 PROCEDURE — 2580000003 HC RX 258: Performed by: STUDENT IN AN ORGANIZED HEALTH CARE EDUCATION/TRAINING PROGRAM

## 2023-07-06 PROCEDURE — 6360000002 HC RX W HCPCS

## 2023-07-06 PROCEDURE — 36410 VNPNXR 3YR/> PHY/QHP DX/THER: CPT

## 2023-07-06 PROCEDURE — 80053 COMPREHEN METABOLIC PANEL: CPT

## 2023-07-06 PROCEDURE — 6360000002 HC RX W HCPCS: Performed by: STUDENT IN AN ORGANIZED HEALTH CARE EDUCATION/TRAINING PROGRAM

## 2023-07-06 PROCEDURE — 2500000003 HC RX 250 WO HCPCS: Performed by: INTERNAL MEDICINE

## 2023-07-06 RX ORDER — FUROSEMIDE 10 MG/ML
INJECTION INTRAMUSCULAR; INTRAVENOUS
Status: COMPLETED
Start: 2023-07-06 | End: 2023-07-06

## 2023-07-06 RX ORDER — MAGNESIUM SULFATE IN WATER 40 MG/ML
2000 INJECTION, SOLUTION INTRAVENOUS ONCE
Status: COMPLETED | OUTPATIENT
Start: 2023-07-06 | End: 2023-07-06

## 2023-07-06 RX ORDER — METOPROLOL SUCCINATE 25 MG/1
12.5 TABLET, EXTENDED RELEASE ORAL DAILY
Status: DISCONTINUED | OUTPATIENT
Start: 2023-07-06 | End: 2023-07-07 | Stop reason: HOSPADM

## 2023-07-06 RX ORDER — DEXTROSE, SODIUM CHLORIDE, AND POTASSIUM CHLORIDE 5; .45; .15 G/100ML; G/100ML; G/100ML
INJECTION INTRAVENOUS CONTINUOUS
Status: DISCONTINUED | OUTPATIENT
Start: 2023-07-06 | End: 2023-07-06

## 2023-07-06 RX ORDER — PANTOPRAZOLE SODIUM 40 MG/1
40 TABLET, DELAYED RELEASE ORAL DAILY
Status: DISCONTINUED | OUTPATIENT
Start: 2023-07-06 | End: 2023-07-07 | Stop reason: HOSPADM

## 2023-07-06 RX ORDER — ACETAMINOPHEN 325 MG/1
325 TABLET ORAL EVERY 6 HOURS PRN
Status: DISCONTINUED | OUTPATIENT
Start: 2023-07-06 | End: 2023-07-07 | Stop reason: HOSPADM

## 2023-07-06 RX ORDER — LEVOTHYROXINE SODIUM 0.07 MG/1
75 TABLET ORAL DAILY
Status: DISCONTINUED | OUTPATIENT
Start: 2023-07-06 | End: 2023-07-07 | Stop reason: HOSPADM

## 2023-07-06 RX ORDER — FUROSEMIDE 10 MG/ML
40 INJECTION INTRAMUSCULAR; INTRAVENOUS ONCE
Status: COMPLETED | OUTPATIENT
Start: 2023-07-06 | End: 2023-07-06

## 2023-07-06 RX ORDER — AMIODARONE HYDROCHLORIDE 200 MG/1
200 TABLET ORAL 2 TIMES DAILY
Status: DISCONTINUED | OUTPATIENT
Start: 2023-07-06 | End: 2023-07-07

## 2023-07-06 RX ADMIN — AMPICILLIN SODIUM AND SULBACTAM SODIUM 3000 MG: 2; 1 INJECTION, POWDER, FOR SOLUTION INTRAMUSCULAR; INTRAVENOUS at 21:57

## 2023-07-06 RX ADMIN — PETROLATUM: 420 OINTMENT TOPICAL at 08:22

## 2023-07-06 RX ADMIN — AMPICILLIN SODIUM AND SULBACTAM SODIUM 3000 MG: 2; 1 INJECTION, POWDER, FOR SOLUTION INTRAMUSCULAR; INTRAVENOUS at 05:20

## 2023-07-06 RX ADMIN — SODIUM CHLORIDE, PRESERVATIVE FREE 10 ML: 5 INJECTION INTRAVENOUS at 21:56

## 2023-07-06 RX ADMIN — ALBUTEROL SULFATE 2.5 MG: 2.5 SOLUTION RESPIRATORY (INHALATION) at 12:10

## 2023-07-06 RX ADMIN — LEVOTHYROXINE SODIUM 75 MCG: 75 TABLET ORAL at 08:22

## 2023-07-06 RX ADMIN — SACUBITRIL AND VALSARTAN 1 TABLET: 24; 26 TABLET, FILM COATED ORAL at 21:55

## 2023-07-06 RX ADMIN — AMPICILLIN SODIUM AND SULBACTAM SODIUM 3000 MG: 2; 1 INJECTION, POWDER, FOR SOLUTION INTRAMUSCULAR; INTRAVENOUS at 11:35

## 2023-07-06 RX ADMIN — Medication 100 UNITS: at 21:55

## 2023-07-06 RX ADMIN — ARFORMOTEROL TARTRATE 15 MCG: 15 SOLUTION RESPIRATORY (INHALATION) at 08:23

## 2023-07-06 RX ADMIN — ALBUTEROL SULFATE 2.5 MG: 2.5 SOLUTION RESPIRATORY (INHALATION) at 08:23

## 2023-07-06 RX ADMIN — PETROLATUM: 420 OINTMENT TOPICAL at 21:56

## 2023-07-06 RX ADMIN — AMIODARONE HYDROCHLORIDE 200 MG: 200 TABLET ORAL at 21:55

## 2023-07-06 RX ADMIN — METOPROLOL SUCCINATE 12.5 MG: 25 TABLET, EXTENDED RELEASE ORAL at 08:22

## 2023-07-06 RX ADMIN — AMIODARONE HYDROCHLORIDE 200 MG: 200 TABLET ORAL at 08:22

## 2023-07-06 RX ADMIN — ALBUTEROL SULFATE 2.5 MG: 2.5 SOLUTION RESPIRATORY (INHALATION) at 16:22

## 2023-07-06 RX ADMIN — SACUBITRIL AND VALSARTAN 0.5 TABLET: 24; 26 TABLET, FILM COATED ORAL at 08:22

## 2023-07-06 RX ADMIN — FUROSEMIDE 40 MG: 10 INJECTION, SOLUTION INTRAMUSCULAR; INTRAVENOUS at 05:32

## 2023-07-06 RX ADMIN — POTASSIUM CHLORIDE, DEXTROSE MONOHYDRATE AND SODIUM CHLORIDE: 150; 5; 450 INJECTION, SOLUTION INTRAVENOUS at 06:58

## 2023-07-06 RX ADMIN — SODIUM CHLORIDE, PRESERVATIVE FREE 10 ML: 5 INJECTION INTRAVENOUS at 08:22

## 2023-07-06 RX ADMIN — POTASSIUM PHOSPHATE, MONOBASIC AND POTASSIUM PHOSPHATE, DIBASIC 20 MMOL: 224; 236 INJECTION, SOLUTION, CONCENTRATE INTRAVENOUS at 11:34

## 2023-07-06 RX ADMIN — AMPICILLIN SODIUM AND SULBACTAM SODIUM 3000 MG: 2; 1 INJECTION, POWDER, FOR SOLUTION INTRAMUSCULAR; INTRAVENOUS at 17:09

## 2023-07-06 RX ADMIN — ARFORMOTEROL TARTRATE 15 MCG: 15 SOLUTION RESPIRATORY (INHALATION) at 21:11

## 2023-07-06 RX ADMIN — SODIUM CHLORIDE, PRESERVATIVE FREE 10 ML: 5 INJECTION INTRAVENOUS at 09:41

## 2023-07-06 RX ADMIN — FUROSEMIDE 40 MG: 10 INJECTION INTRAMUSCULAR; INTRAVENOUS at 05:32

## 2023-07-06 RX ADMIN — PANTOPRAZOLE SODIUM 40 MG: 40 TABLET, DELAYED RELEASE ORAL at 08:22

## 2023-07-06 RX ADMIN — ALBUTEROL SULFATE 2.5 MG: 2.5 SOLUTION RESPIRATORY (INHALATION) at 21:11

## 2023-07-06 RX ADMIN — MAGNESIUM SULFATE HEPTAHYDRATE 2000 MG: 40 INJECTION, SOLUTION INTRAVENOUS at 07:00

## 2023-07-06 RX ADMIN — HYDROMORPHONE HYDROCHLORIDE 0.5 MG: 1 INJECTION, SOLUTION INTRAMUSCULAR; INTRAVENOUS; SUBCUTANEOUS at 05:10

## 2023-07-06 ASSESSMENT — PAIN DESCRIPTION - LOCATION: LOCATION: ABDOMEN

## 2023-07-06 ASSESSMENT — PAIN DESCRIPTION - ORIENTATION: ORIENTATION: MID;LOWER

## 2023-07-06 ASSESSMENT — PAIN SCALES - GENERAL
PAINLEVEL_OUTOF10: 0
PAINLEVEL_OUTOF10: 9

## 2023-07-06 ASSESSMENT — PAIN - FUNCTIONAL ASSESSMENT: PAIN_FUNCTIONAL_ASSESSMENT: PREVENTS OR INTERFERES SOME ACTIVE ACTIVITIES AND ADLS

## 2023-07-06 ASSESSMENT — PAIN DESCRIPTION - DESCRIPTORS: DESCRIPTORS: SORE;DISCOMFORT

## 2023-07-06 NOTE — PROCEDURES
MIDLINE  Catheter insertion date 7/6/2023     Product Number:  [facility] [id number]   Lot No: [id number]   Gauge: 17   Lumen: YAHQSK,3.1KP   R Basilic    Vein Diameter : 0.43cm   Mid-Upper arm circumference 18cm   Catheter Length : 15CM                    Internal Length: 14cm   Exposed Catheter Length: 1cm   Ultrasound Used:yes              0.5cc 1% lidocaine given to right arm  : VALENTINO Salgado

## 2023-07-06 NOTE — CARE COORDINATION
Transition of Care-General Surgery to advance diet as patient tolerates,remains on clears. Unasyn Q6 continues & IV Lasix x1 given today. Discharge plan is [place], pre-cert submitted 7/3. JAMILA, HENS and transportation form completed.     Elina KNOWLESN, RN  [facility]

## 2023-07-07 VITALS
HEART RATE: 86 BPM | TEMPERATURE: 98.6 F | DIASTOLIC BLOOD PRESSURE: 119 MMHG | RESPIRATION RATE: 16 BRPM | SYSTOLIC BLOOD PRESSURE: 135 MMHG | HEIGHT: 68 IN | BODY MASS INDEX: 18.71 KG/M2 | WEIGHT: 123.46 LBS | OXYGEN SATURATION: 100 %

## 2023-07-07 LAB
ALBUMIN SERPL-MCNC: 2.3 G/DL (ref 3.5–5.2)
ALP SERPL-CCNC: 91 U/L (ref 35–104)
ALT SERPL-CCNC: 15 U/L (ref 0–32)
ANION GAP SERPL CALCULATED.3IONS-SCNC: 6 MMOL/L (ref 7–16)
ANISOCYTOSIS: ABNORMAL
AST SERPL-CCNC: 15 U/L (ref 0–31)
BASOPHILS # BLD: 0.01 E9/L (ref 0–0.2)
BASOPHILS NFR BLD: 0.1 % (ref 0–2)
BILIRUB SERPL-MCNC: 0.3 MG/DL (ref 0–1.2)
BUN SERPL-MCNC: 5 MG/DL (ref 6–23)
CALCIUM SERPL-MCNC: 8.1 MG/DL (ref 8.6–10.2)
CHLORIDE SERPL-SCNC: 102 MMOL/L (ref 98–107)
CO2 SERPL-SCNC: 34 MMOL/L (ref 22–29)
CREAT SERPL-MCNC: 0.7 MG/DL (ref 0.5–1)
EOSINOPHIL # BLD: 0.11 E9/L (ref 0.05–0.5)
EOSINOPHIL NFR BLD: 1.1 % (ref 0–6)
ERYTHROCYTE [DISTWIDTH] IN BLOOD BY AUTOMATED COUNT: 17.2 FL (ref 11.5–15)
GLUCOSE SERPL-MCNC: 94 MG/DL (ref 74–99)
HCT VFR BLD AUTO: 26.8 % (ref 34–48)
HGB BLD-MCNC: 8.1 G/DL (ref 11.5–15.5)
HYPOCHROMIA: ABNORMAL
IMM GRANULOCYTES # BLD: 0.07 E9/L
IMM GRANULOCYTES NFR BLD: 0.7 % (ref 0–5)
LYMPHOCYTES # BLD: 0.34 E9/L (ref 1.5–4)
LYMPHOCYTES NFR BLD: 3.5 % (ref 20–42)
MAGNESIUM SERPL-MCNC: 1.9 MG/DL (ref 1.6–2.6)
MCH RBC QN AUTO: 28.4 PG (ref 26–35)
MCHC RBC AUTO-ENTMCNC: 30.2 % (ref 32–34.5)
MCV RBC AUTO: 94 FL (ref 80–99.9)
MONOCYTES # BLD: 0.51 E9/L (ref 0.1–0.95)
MONOCYTES NFR BLD: 5.3 % (ref 2–12)
NEUTROPHILS # BLD: 8.6 E9/L (ref 1.8–7.3)
NEUTS SEG NFR BLD: 89.3 % (ref 43–80)
OVALOCYTES: ABNORMAL
PHOSPHATE SERPL-MCNC: 2.5 MG/DL (ref 2.5–4.5)
PLATELET # BLD AUTO: 300 E9/L (ref 130–450)
PMV BLD AUTO: 9.9 FL (ref 7–12)
POIKILOCYTES: ABNORMAL
POTASSIUM SERPL-SCNC: 3.4 MMOL/L (ref 3.5–5)
PROT SERPL-MCNC: 5.1 G/DL (ref 6.4–8.3)
RBC # BLD AUTO: 2.85 E12/L (ref 3.5–5.5)
SODIUM SERPL-SCNC: 142 MMOL/L (ref 132–146)
WBC # BLD: 9.6 E9/L (ref 4.5–11.5)

## 2023-07-07 PROCEDURE — 84100 ASSAY OF PHOSPHORUS: CPT

## 2023-07-07 PROCEDURE — 6360000002 HC RX W HCPCS: Performed by: INTERNAL MEDICINE

## 2023-07-07 PROCEDURE — 36415 COLL VENOUS BLD VENIPUNCTURE: CPT

## 2023-07-07 PROCEDURE — 80053 COMPREHEN METABOLIC PANEL: CPT

## 2023-07-07 PROCEDURE — 2700000000 HC OXYGEN THERAPY PER DAY

## 2023-07-07 PROCEDURE — 6370000000 HC RX 637 (ALT 250 FOR IP): Performed by: INTERNAL MEDICINE

## 2023-07-07 PROCEDURE — 2580000003 HC RX 258: Performed by: STUDENT IN AN ORGANIZED HEALTH CARE EDUCATION/TRAINING PROGRAM

## 2023-07-07 PROCEDURE — 83735 ASSAY OF MAGNESIUM: CPT

## 2023-07-07 PROCEDURE — 6360000002 HC RX W HCPCS: Performed by: STUDENT IN AN ORGANIZED HEALTH CARE EDUCATION/TRAINING PROGRAM

## 2023-07-07 PROCEDURE — 94640 AIRWAY INHALATION TREATMENT: CPT

## 2023-07-07 PROCEDURE — 85025 COMPLETE CBC W/AUTO DIFF WBC: CPT

## 2023-07-07 RX ORDER — SPIRONOLACTONE 25 MG/1
12.5 TABLET ORAL DAILY
Qty: 30 TABLET | Refills: 3 | Status: SHIPPED | OUTPATIENT
Start: 2023-07-07

## 2023-07-07 RX ORDER — SPIRONOLACTONE 25 MG/1
12.5 TABLET ORAL DAILY
Qty: 30 TABLET | Refills: 3 | OUTPATIENT
Start: 2023-07-07

## 2023-07-07 RX ORDER — BUMETANIDE 0.5 MG/1
0.5 TABLET ORAL
Qty: 30 TABLET | Refills: 3 | Status: SHIPPED | OUTPATIENT
Start: 2023-07-10

## 2023-07-07 RX ORDER — AMIODARONE HYDROCHLORIDE 200 MG/1
100 TABLET ORAL DAILY
Status: DISCONTINUED | OUTPATIENT
Start: 2023-07-08 | End: 2023-07-07 | Stop reason: HOSPADM

## 2023-07-07 RX ORDER — FUROSEMIDE 10 MG/ML
40 INJECTION INTRAMUSCULAR; INTRAVENOUS ONCE
Status: COMPLETED | OUTPATIENT
Start: 2023-07-07 | End: 2023-07-07

## 2023-07-07 RX ORDER — SPIRONOLACTONE 25 MG/1
12.5 TABLET ORAL DAILY
Status: DISCONTINUED | OUTPATIENT
Start: 2023-07-07 | End: 2023-07-07 | Stop reason: HOSPADM

## 2023-07-07 RX ORDER — IPRATROPIUM BROMIDE AND ALBUTEROL SULFATE 2.5; .5 MG/3ML; MG/3ML
3 SOLUTION RESPIRATORY (INHALATION) EVERY 4 HOURS PRN
Qty: 360 ML | Refills: 1 | Status: SHIPPED | OUTPATIENT
Start: 2023-07-07

## 2023-07-07 RX ORDER — METOPROLOL SUCCINATE 25 MG/1
12.5 TABLET, EXTENDED RELEASE ORAL DAILY
Qty: 30 TABLET | Refills: 3 | Status: SHIPPED | OUTPATIENT
Start: 2023-07-08

## 2023-07-07 RX ORDER — LOSARTAN POTASSIUM 25 MG/1
25 TABLET ORAL DAILY
Status: DISCONTINUED | OUTPATIENT
Start: 2023-07-08 | End: 2023-07-07 | Stop reason: HOSPADM

## 2023-07-07 RX ORDER — LOSARTAN POTASSIUM 25 MG/1
25 TABLET ORAL DAILY
Qty: 30 TABLET | Refills: 3 | OUTPATIENT
Start: 2023-07-08

## 2023-07-07 RX ORDER — ARFORMOTEROL TARTRATE 15 UG/2ML
15 SOLUTION RESPIRATORY (INHALATION) 2 TIMES DAILY
Qty: 120 ML | Refills: 3 | Status: SHIPPED | OUTPATIENT
Start: 2023-07-07

## 2023-07-07 RX ORDER — AMIODARONE HYDROCHLORIDE 100 MG/1
100 TABLET ORAL DAILY
Qty: 90 TABLET | Refills: 3 | OUTPATIENT
Start: 2023-07-08

## 2023-07-07 RX ORDER — BUMETANIDE 1 MG/1
0.5 TABLET ORAL
Status: DISCONTINUED | OUTPATIENT
Start: 2023-07-10 | End: 2023-07-07 | Stop reason: HOSPADM

## 2023-07-07 RX ORDER — AMIODARONE HYDROCHLORIDE 100 MG/1
100 TABLET ORAL DAILY
Qty: 30 TABLET | Refills: 0 | Status: SHIPPED | OUTPATIENT
Start: 2023-07-08

## 2023-07-07 RX ORDER — BUMETANIDE 0.5 MG/1
0.5 TABLET ORAL
Qty: 30 TABLET | Refills: 3 | OUTPATIENT
Start: 2023-07-10

## 2023-07-07 RX ORDER — ALBUTEROL SULFATE 2.5 MG/3ML
2.5 SOLUTION RESPIRATORY (INHALATION) EVERY 4 HOURS
Qty: 120 EACH | Refills: 3 | Status: SHIPPED | OUTPATIENT
Start: 2023-07-07

## 2023-07-07 RX ADMIN — ALBUTEROL SULFATE 2.5 MG: 2.5 SOLUTION RESPIRATORY (INHALATION) at 08:47

## 2023-07-07 RX ADMIN — ALBUTEROL SULFATE 2.5 MG: 2.5 SOLUTION RESPIRATORY (INHALATION) at 12:53

## 2023-07-07 RX ADMIN — AMPICILLIN SODIUM AND SULBACTAM SODIUM 3000 MG: 2; 1 INJECTION, POWDER, FOR SOLUTION INTRAMUSCULAR; INTRAVENOUS at 05:09

## 2023-07-07 RX ADMIN — PANTOPRAZOLE SODIUM 40 MG: 40 TABLET, DELAYED RELEASE ORAL at 11:22

## 2023-07-07 RX ADMIN — ARFORMOTEROL TARTRATE 15 MCG: 15 SOLUTION RESPIRATORY (INHALATION) at 08:47

## 2023-07-07 RX ADMIN — METOPROLOL SUCCINATE 12.5 MG: 25 TABLET, EXTENDED RELEASE ORAL at 11:23

## 2023-07-07 RX ADMIN — LEVOTHYROXINE SODIUM 75 MCG: 75 TABLET ORAL at 11:22

## 2023-07-07 RX ADMIN — SODIUM CHLORIDE, PRESERVATIVE FREE 10 ML: 5 INJECTION INTRAVENOUS at 11:27

## 2023-07-07 RX ADMIN — PETROLATUM: 420 OINTMENT TOPICAL at 11:26

## 2023-07-07 RX ADMIN — POTASSIUM BICARBONATE 20 MEQ: 782 TABLET, EFFERVESCENT ORAL at 06:30

## 2023-07-07 RX ADMIN — SACUBITRIL AND VALSARTAN 1 TABLET: 24; 26 TABLET, FILM COATED ORAL at 11:22

## 2023-07-07 RX ADMIN — FUROSEMIDE 40 MG: 10 INJECTION, SOLUTION INTRAMUSCULAR; INTRAVENOUS at 06:30

## 2023-07-07 RX ADMIN — AMPICILLIN SODIUM AND SULBACTAM SODIUM 3000 MG: 2; 1 INJECTION, POWDER, FOR SOLUTION INTRAMUSCULAR; INTRAVENOUS at 18:12

## 2023-07-07 RX ADMIN — ALBUTEROL SULFATE 2.5 MG: 2.5 SOLUTION RESPIRATORY (INHALATION) at 16:11

## 2023-07-07 RX ADMIN — AMIODARONE HYDROCHLORIDE 200 MG: 200 TABLET ORAL at 11:22

## 2023-07-07 RX ADMIN — Medication 100 UNITS: at 11:21

## 2023-07-07 RX ADMIN — AMPICILLIN SODIUM AND SULBACTAM SODIUM 3000 MG: 2; 1 INJECTION, POWDER, FOR SOLUTION INTRAMUSCULAR; INTRAVENOUS at 11:20

## 2023-07-07 RX ADMIN — SPIRONOLACTONE 12.5 MG: 25 TABLET ORAL at 18:00

## 2023-07-07 NOTE — CARE COORDINATION
Transition of Care-Anticipate discharge-auth obtained, set up via [facility] for 7 pm. Liaison requested ostomy supplies be sent over. Family at bedside, RN to update on time of transfer.      Angelic Perez BSN, RN  [facility]

## 2023-07-15 NOTE — DISCHARGE SUMMARY
Patient ID:  Briseyda Trevino  [id number]  66 y.o.  1945    Admit date: 6/29/2023    Discharge date and time: 7/7/2023  7:47 PM     Admission Diagnoses: Small bowel obstruction ([address]) [K56.609]  Constipation, unspecified constipation type [K59.00]    Discharge Diagnoses: bowel perforation    Consults: cardiology, pulmonary/intensive care, and general surgery    Procedures: Hartmanns procedure    Hospital Course: Patient admitted with acute perforation of bowel. She was taken to the OR emergently. She was found to have perforated colon with underlying peritonitis. She was cleaned out. Underwent sigmoid resection with colostomy. She was seen by ID. ABT adjusted per cultures. She slowly improved. Transferred out of ICU. She stabilized and was able t o eat. Her po cardiac med's reviewed and adjusted by her cardiologist.she was discharged in fair condition to [facility]. Discharge Exam:  See progress note from today    Disposition: SNF    Patient Instructions:   Discharge Medication List as of 7/7/2023  7:38 PM        START taking these medications    Details   ampicillin-sulbactam (UNASYN) infusion Infuse 3,000 mg intravenously in the morning and 3,000 mg at noon and 3,000 mg in the evening and 3,000 mg before bedtime. Do all this for 10 days.  Compound per protocol., Disp-120 g, R-0Print      albuterol (PROVENTIL) (2.5 MG/3ML) 0.083% nebulizer solution Take 3 mLs by nebulization every 4 hours, Disp-120 each, R-3Normal      bumetanide (BUMEX) 0.5 MG tablet Take 1 tablet by mouth three times a week, Disp-30 tablet, R-3Normal      arformoterol tartrate (BROVANA) 15 MCG/2ML NEBU Take 2 mLs by nebulization in the morning and 2 mLs in the evening., Disp-120 mL, R-3Normal      spironolactone (ALDACTONE) 25 MG tablet Take 0.5 tablets by mouth daily, Disp-30 tablet, R-3Normal      white petrolatum OINT ointment Apply topically 2 times daily, Topical, 2 TIMES DAILY Starting Fri 7/7/2023, Disp-500 g, R-1, Normal      ipratropium

## 2023-07-17 LAB
FUNGUS SPEC CULT: NORMAL
LOEFFLER MB STN SPEC: NORMAL

## 2023-07-18 LAB
ACID FAST STN SPEC QL: NORMAL
MYCOBACTERIUM SPEC CULT: NORMAL

## 2023-07-24 LAB
FUNGUS SPEC CULT: NORMAL
LOEFFLER MB STN SPEC: NORMAL

## 2023-07-25 LAB
ACID FAST STN SPEC QL: NORMAL
MYCOBACTERIUM SPEC CULT: NORMAL

## 2023-07-31 LAB
FUNGUS SPEC CULT: NORMAL
LOEFFLER MB STN SPEC: NORMAL

## 2023-08-01 LAB
ACID FAST STN SPEC QL: NORMAL
MYCOBACTERIUM SPEC CULT: NORMAL

## 2023-08-07 LAB
FUNGUS SPEC CULT: NORMAL
LOEFFLER MB STN SPEC: NORMAL

## 2023-08-08 LAB
ACID FAST STN SPEC QL: NORMAL
MYCOBACTERIUM SPEC CULT: NORMAL

## 2023-08-15 LAB
ACID FAST STN SPEC QL: NORMAL
MYCOBACTERIUM SPEC CULT: NORMAL

## 2023-08-22 LAB
ACID FAST STN SPEC QL: NORMAL
MYCOBACTERIUM SPEC CULT: NORMAL

## 2023-09-16 ENCOUNTER — APPOINTMENT (OUTPATIENT)
Dept: GENERAL RADIOLOGY | Age: 78
End: 2023-09-16
Payer: MEDICARE

## 2023-09-16 ENCOUNTER — APPOINTMENT (OUTPATIENT)
Dept: CT IMAGING | Age: 78
End: 2023-09-16
Payer: MEDICARE

## 2023-09-16 ENCOUNTER — HOSPITAL ENCOUNTER (INPATIENT)
Age: 78
LOS: 5 days | Discharge: NURSING, REHAB OR LONG-TERM CARE | End: 2023-09-21
Attending: STUDENT IN AN ORGANIZED HEALTH CARE EDUCATION/TRAINING PROGRAM | Admitting: INTERNAL MEDICINE
Payer: MEDICARE

## 2023-09-16 DIAGNOSIS — R09.02 HYPOXIA: ICD-10-CM

## 2023-09-16 DIAGNOSIS — D72.829 LEUKOCYTOSIS, UNSPECIFIED TYPE: ICD-10-CM

## 2023-09-16 DIAGNOSIS — R79.89 ELEVATED TROPONIN: Primary | ICD-10-CM

## 2023-09-16 PROBLEM — R77.8 TROPONIN LEVEL ELEVATED: Status: ACTIVE | Noted: 2023-09-16

## 2023-09-16 LAB
ALBUMIN SERPL-MCNC: 3.9 G/DL (ref 3.5–5.2)
ALP SERPL-CCNC: 105 U/L (ref 35–104)
ALT SERPL-CCNC: 20 U/L (ref 0–32)
ANION GAP SERPL CALCULATED.3IONS-SCNC: 11 MMOL/L (ref 7–16)
AST SERPL-CCNC: 22 U/L (ref 0–31)
BASOPHILS # BLD: 0.01 K/UL (ref 0–0.2)
BASOPHILS NFR BLD: 0 % (ref 0–2)
BILIRUB SERPL-MCNC: 0.3 MG/DL (ref 0–1.2)
BUN SERPL-MCNC: 26 MG/DL (ref 6–23)
CALCIUM SERPL-MCNC: 9.4 MG/DL (ref 8.6–10.2)
CHLORIDE SERPL-SCNC: 99 MMOL/L (ref 98–107)
CK SERPL-CCNC: 129 U/L (ref 20–180)
CO2 SERPL-SCNC: 30 MMOL/L (ref 22–29)
CREAT SERPL-MCNC: 0.9 MG/DL (ref 0.5–1)
EKG ATRIAL RATE: 77 BPM
EKG P AXIS: 69 DEGREES
EKG P-R INTERVAL: 132 MS
EKG Q-T INTERVAL: 392 MS
EKG QRS DURATION: 78 MS
EKG QTC CALCULATION (BAZETT): 443 MS
EKG R AXIS: 72 DEGREES
EKG T AXIS: 82 DEGREES
EKG VENTRICULAR RATE: 77 BPM
EOSINOPHIL # BLD: 0 K/UL (ref 0.05–0.5)
EOSINOPHILS RELATIVE PERCENT: 0 % (ref 0–6)
ERYTHROCYTE [DISTWIDTH] IN BLOOD BY AUTOMATED COUNT: 16.6 % (ref 11.5–15)
GFR SERPL CREATININE-BSD FRML MDRD: >60 ML/MIN/1.73M2
GLUCOSE SERPL-MCNC: 127 MG/DL (ref 74–99)
HCT VFR BLD AUTO: 29.8 % (ref 34–48)
HGB BLD-MCNC: 8.9 G/DL (ref 11.5–15.5)
IMM GRANULOCYTES # BLD AUTO: 0.07 K/UL (ref 0–0.58)
IMM GRANULOCYTES NFR BLD: 1 % (ref 0–5)
LYMPHOCYTES NFR BLD: 0.37 K/UL (ref 1.5–4)
LYMPHOCYTES RELATIVE PERCENT: 3 % (ref 20–42)
MCH RBC QN AUTO: 28.2 PG (ref 26–35)
MCHC RBC AUTO-ENTMCNC: 29.9 G/DL (ref 32–34.5)
MCV RBC AUTO: 94.3 FL (ref 80–99.9)
MONOCYTES NFR BLD: 0.66 K/UL (ref 0.1–0.95)
MONOCYTES NFR BLD: 5 % (ref 2–12)
NEUTROPHILS NFR BLD: 91 % (ref 43–80)
NEUTS SEG NFR BLD: 11.17 K/UL (ref 1.8–7.3)
PLATELET # BLD AUTO: 218 K/UL (ref 130–450)
PMV BLD AUTO: 9.8 FL (ref 7–12)
POTASSIUM SERPL-SCNC: 4.3 MMOL/L (ref 3.5–5)
PROT SERPL-MCNC: 7.2 G/DL (ref 6.4–8.3)
RBC # BLD AUTO: 3.16 M/UL (ref 3.5–5.5)
RBC # BLD: ABNORMAL 10*6/UL
SODIUM SERPL-SCNC: 140 MMOL/L (ref 132–146)
TROPONIN I SERPL HS-MCNC: 86 NG/L (ref 0–9)
TROPONIN I SERPL HS-MCNC: 92 NG/L (ref 0–9)
WBC OTHER # BLD: 12.3 K/UL (ref 4.5–11.5)

## 2023-09-16 PROCEDURE — 70450 CT HEAD/BRAIN W/O DYE: CPT

## 2023-09-16 PROCEDURE — 72125 CT NECK SPINE W/O DYE: CPT

## 2023-09-16 PROCEDURE — 6370000000 HC RX 637 (ALT 250 FOR IP)

## 2023-09-16 PROCEDURE — 73552 X-RAY EXAM OF FEMUR 2/>: CPT

## 2023-09-16 PROCEDURE — 99285 EMERGENCY DEPT VISIT HI MDM: CPT

## 2023-09-16 PROCEDURE — 84484 ASSAY OF TROPONIN QUANT: CPT

## 2023-09-16 PROCEDURE — 80053 COMPREHEN METABOLIC PANEL: CPT

## 2023-09-16 PROCEDURE — 73521 X-RAY EXAM HIPS BI 2 VIEWS: CPT

## 2023-09-16 PROCEDURE — 85025 COMPLETE CBC W/AUTO DIFF WBC: CPT

## 2023-09-16 PROCEDURE — 82550 ASSAY OF CK (CPK): CPT

## 2023-09-16 PROCEDURE — 71045 X-RAY EXAM CHEST 1 VIEW: CPT

## 2023-09-16 PROCEDURE — 1200000000 HC SEMI PRIVATE

## 2023-09-16 RX ORDER — IBUPROFEN 600 MG/1
600 TABLET ORAL
Status: DISCONTINUED | OUTPATIENT
Start: 2023-09-16 | End: 2023-09-16

## 2023-09-16 RX ORDER — IBUPROFEN 600 MG/1
600 TABLET ORAL
Status: COMPLETED | OUTPATIENT
Start: 2023-09-16 | End: 2023-09-16

## 2023-09-16 RX ADMIN — IBUPROFEN 600 MG: 600 TABLET, FILM COATED ORAL at 19:47

## 2023-09-16 ASSESSMENT — PAIN DESCRIPTION - LOCATION
LOCATION: LEG
LOCATION: LEG

## 2023-09-16 ASSESSMENT — PAIN DESCRIPTION - DESCRIPTORS: DESCRIPTORS: ACHING;DISCOMFORT

## 2023-09-16 ASSESSMENT — PAIN SCALES - GENERAL
PAINLEVEL_OUTOF10: 6
PAINLEVEL_OUTOF10: 5

## 2023-09-16 ASSESSMENT — PAIN - FUNCTIONAL ASSESSMENT: PAIN_FUNCTIONAL_ASSESSMENT: 0-10

## 2023-09-16 ASSESSMENT — PAIN DESCRIPTION - ORIENTATION
ORIENTATION: RIGHT;LEFT
ORIENTATION: RIGHT;LEFT

## 2023-09-16 NOTE — ED PROVIDER NOTES
Department of Emergency Medicine     Written by: Zulay Mcdaniel DO  Patient Name: Joe Oseguera Date: 2023  2:42 PM  MRN: [id number]                   : 1945      HPI  Chief Complaint   Patient presents with    Extremity Weakness     Legs are weak    Fall     Denies hitting head, no thinners    Leg Pain     Bilateral leg pain after the fall     This is a 80-year-old female with history of COPD, atrial fibrillation, pulmonary hypertension, CHF who presents emergency department today following a fall. Patient states she was out walking out of the residence today on the stairs and feels that she \"missed a step or stepped on her ankle wrong\". She denies any preceding symptoms such as shortness of breath, dizziness, lightheadedness. She states that when she was on the ground, she did not feel she could get to her feet due to some leg weakness so she crawled back up to the top of the steps on her backside. She does not know for sure how long she was down following the fall although she does deny any loss of consciousness. Denies hitting her head and denies any blood thinner use. Patient endorses pain at this time in her bilateral hips. She does feel that her legs have been weaker than normal since this morning. Nursing notes were all reviewed and agreed with or any disagreements were addressed in the HPI. Review of systems:    Pertinent positives and negatives mentioned in the HPI/MDM. Physical Exam  Constitutional:       General: She is not in acute distress. HENT:      Head: Normocephalic and atraumatic. Eyes:      Extraocular Movements: Extraocular movements intact. Pupils: Pupils are equal, round, and reactive to light. Cardiovascular:      Rate and Rhythm: Normal rate and regular rhythm. Pulmonary:      Effort: Pulmonary effort is normal.      Breath sounds: Normal breath sounds. No stridor. No wheezing, rhonchi or rales.    Abdominal:      General: Abdomen is intact. XR FEMUR LEFT (MIN 2 VIEWS)   Final Result   No acute osseous abnormality. XR FEMUR RIGHT (MIN 2 VIEWS)   Final Result   No acute fracture or subluxation         XR CHEST PORTABLE   Final Result   Stable lung hyperinflation and coarsened interstitial markings. Bilateral   lung fibrotic change. Prominence of the thoracic aorta with atherosclerotic   disease. No acute cardiopulmonary pathology seen. No results found. No results found. EKG: This EKG is signed and interpreted by me. Rate: 77  Rhythm: Sinus and occasional PVCs  Interpretation: non-specific EKG  Comparison: changes compared to previous EKG. Nonspecific ST and T wave abnormalities present currently. Medications administered today:  Medications   ibuprofen (ADVIL;MOTRIN) tablet 600 mg (600 mg Oral Given 9/16/23 1947)       CONSULTS: discussion with bolded \"IP consult\", otherwise consult was likely placed by admitting service  IP CONSULT TO INTERNAL MEDICINE    MDM   History is obtained from patient and family for patient's acute onset of mild fall. Differential diagnoses considered in the workup of this patient include but are not limited to anemia, cardiac arrhythmia, electrolyte abnormality, CVA, intracerebral hemorrhage, ACS. Patient is placed on cardiac monitor and continuous pulse ox for monitoring. EKG is ordered to evaluate patient's current cardiac rhythm, and to interpret QT interval prior to administering medications. CBC is ordered to evaluate for any signs of infection or inflammation by obtaining a WBC count, or any signs of acute anemia by interpreting hemoglobin. CMP was ordered to evaluate for any electrolyte imbalances, kidney function, hepatic injury or any elevations in anion gap. Troponin ordered to evaluate for possible cardiac etiology of symptoms including but not limited to STEMI or NSTEMI. CK level ordered for suspicion of rhabdomyolysis.  Chest x-ray is ordered to evaluate for

## 2023-09-16 NOTE — ED NOTES
Pt placed on 2 L nasal cannula in room. Pt not in distress and states she is not short of breath.      Maegan Arguelles RN  09/16/23 [id number]

## 2023-09-17 LAB
ANION GAP SERPL CALCULATED.3IONS-SCNC: 6 MMOL/L (ref 7–16)
BASOPHILS # BLD: 0.02 K/UL (ref 0–0.2)
BASOPHILS NFR BLD: 0 % (ref 0–2)
BUN SERPL-MCNC: 28 MG/DL (ref 6–23)
CALCIUM SERPL-MCNC: 9.1 MG/DL (ref 8.6–10.2)
CHLORIDE SERPL-SCNC: 105 MMOL/L (ref 98–107)
CO2 SERPL-SCNC: 33 MMOL/L (ref 22–29)
CREAT SERPL-MCNC: 1.3 MG/DL (ref 0.5–1)
EOSINOPHIL # BLD: 0.12 K/UL (ref 0.05–0.5)
EOSINOPHILS RELATIVE PERCENT: 2 % (ref 0–6)
ERYTHROCYTE [DISTWIDTH] IN BLOOD BY AUTOMATED COUNT: 16.6 % (ref 11.5–15)
GFR SERPL CREATININE-BSD FRML MDRD: 42 ML/MIN/1.73M2
GLUCOSE SERPL-MCNC: 89 MG/DL (ref 74–99)
HCT VFR BLD AUTO: 26 % (ref 34–48)
HGB BLD-MCNC: 7.7 G/DL (ref 11.5–15.5)
IMM GRANULOCYTES # BLD AUTO: 0.06 K/UL (ref 0–0.58)
IMM GRANULOCYTES NFR BLD: 1 % (ref 0–5)
LYMPHOCYTES NFR BLD: 0.57 K/UL (ref 1.5–4)
LYMPHOCYTES RELATIVE PERCENT: 7 % (ref 20–42)
MCH RBC QN AUTO: 28 PG (ref 26–35)
MCHC RBC AUTO-ENTMCNC: 29.6 G/DL (ref 32–34.5)
MCV RBC AUTO: 94.5 FL (ref 80–99.9)
MONOCYTES NFR BLD: 0.59 K/UL (ref 0.1–0.95)
MONOCYTES NFR BLD: 8 % (ref 2–12)
NEUTROPHILS NFR BLD: 83 % (ref 43–80)
NEUTS SEG NFR BLD: 6.42 K/UL (ref 1.8–7.3)
PLATELET # BLD AUTO: 159 K/UL (ref 130–450)
PMV BLD AUTO: 9.2 FL (ref 7–12)
POTASSIUM SERPL-SCNC: 4.4 MMOL/L (ref 3.5–5)
RBC # BLD AUTO: 2.75 M/UL (ref 3.5–5.5)
RBC # BLD: ABNORMAL 10*6/UL
SODIUM SERPL-SCNC: 144 MMOL/L (ref 132–146)
WBC OTHER # BLD: 7.8 K/UL (ref 4.5–11.5)

## 2023-09-17 PROCEDURE — 51798 US URINE CAPACITY MEASURE: CPT

## 2023-09-17 PROCEDURE — 2580000003 HC RX 258: Performed by: INTERNAL MEDICINE

## 2023-09-17 PROCEDURE — 85025 COMPLETE CBC W/AUTO DIFF WBC: CPT

## 2023-09-17 PROCEDURE — 6370000000 HC RX 637 (ALT 250 FOR IP): Performed by: INTERNAL MEDICINE

## 2023-09-17 PROCEDURE — 80048 BASIC METABOLIC PNL TOTAL CA: CPT

## 2023-09-17 PROCEDURE — 1200000000 HC SEMI PRIVATE

## 2023-09-17 PROCEDURE — 51702 INSERT TEMP BLADDER CATH: CPT

## 2023-09-17 RX ORDER — BUMETANIDE 1 MG/1
0.5 TABLET ORAL PRN
Status: DISCONTINUED | OUTPATIENT
Start: 2023-09-17 | End: 2023-09-17

## 2023-09-17 RX ORDER — LEVOTHYROXINE SODIUM 0.07 MG/1
75 TABLET ORAL DAILY
Status: DISCONTINUED | OUTPATIENT
Start: 2023-09-17 | End: 2023-09-21 | Stop reason: HOSPADM

## 2023-09-17 RX ORDER — LEVOTHYROXINE SODIUM 0.05 MG/1
50 TABLET ORAL DAILY
Status: DISCONTINUED | OUTPATIENT
Start: 2023-09-17 | End: 2023-09-17

## 2023-09-17 RX ORDER — METOPROLOL SUCCINATE 25 MG/1
25 TABLET, EXTENDED RELEASE ORAL DAILY
Status: DISCONTINUED | OUTPATIENT
Start: 2023-09-17 | End: 2023-09-17

## 2023-09-17 RX ORDER — ACETAMINOPHEN 500 MG
1000 TABLET ORAL EVERY 6 HOURS PRN
Status: DISCONTINUED | OUTPATIENT
Start: 2023-09-17 | End: 2023-09-21 | Stop reason: HOSPADM

## 2023-09-17 RX ORDER — METOPROLOL SUCCINATE 25 MG/1
12.5 TABLET, EXTENDED RELEASE ORAL DAILY
Status: DISCONTINUED | OUTPATIENT
Start: 2023-09-17 | End: 2023-09-21 | Stop reason: HOSPADM

## 2023-09-17 RX ORDER — AMIODARONE HYDROCHLORIDE 200 MG/1
100 TABLET ORAL DAILY
Status: DISCONTINUED | OUTPATIENT
Start: 2023-09-17 | End: 2023-09-21 | Stop reason: HOSPADM

## 2023-09-17 RX ORDER — PANTOPRAZOLE SODIUM 40 MG/1
40 TABLET, DELAYED RELEASE ORAL
Status: DISCONTINUED | OUTPATIENT
Start: 2023-09-17 | End: 2023-09-21 | Stop reason: HOSPADM

## 2023-09-17 RX ORDER — SODIUM CHLORIDE 9 MG/ML
INJECTION, SOLUTION INTRAVENOUS CONTINUOUS
Status: ACTIVE | OUTPATIENT
Start: 2023-09-17 | End: 2023-09-17

## 2023-09-17 RX ADMIN — METOPROLOL SUCCINATE 12.5 MG: 25 TABLET, EXTENDED RELEASE ORAL at 07:58

## 2023-09-17 RX ADMIN — AMIODARONE HYDROCHLORIDE 100 MG: 200 TABLET ORAL at 07:59

## 2023-09-17 RX ADMIN — SODIUM CHLORIDE: 9 INJECTION, SOLUTION INTRAVENOUS at 09:17

## 2023-09-17 RX ADMIN — ACETAMINOPHEN 1000 MG: 500 TABLET ORAL at 20:29

## 2023-09-17 RX ADMIN — PANTOPRAZOLE SODIUM 40 MG: 40 TABLET, DELAYED RELEASE ORAL at 05:47

## 2023-09-17 RX ADMIN — LEVOTHYROXINE SODIUM 75 MCG: 75 TABLET ORAL at 05:47

## 2023-09-17 ASSESSMENT — PAIN DESCRIPTION - DESCRIPTORS
DESCRIPTORS: ACHING;DISCOMFORT;SORE
DESCRIPTORS: ACHING;CRAMPING;SORE;DISCOMFORT

## 2023-09-17 ASSESSMENT — PAIN SCALES - GENERAL
PAINLEVEL_OUTOF10: 6
PAINLEVEL_OUTOF10: 5
PAINLEVEL_OUTOF10: 5

## 2023-09-17 ASSESSMENT — PAIN DESCRIPTION - ORIENTATION
ORIENTATION: RIGHT
ORIENTATION: RIGHT;LEFT

## 2023-09-17 ASSESSMENT — PAIN DESCRIPTION - LOCATION
LOCATION: GENERALIZED;LEG
LOCATION: LEG

## 2023-09-17 ASSESSMENT — PAIN DESCRIPTION - PAIN TYPE: TYPE: ACUTE PAIN

## 2023-09-17 NOTE — PROGRESS NOTES
4 Eyes Skin Assessment     NAME:  Barb Hall OF BIRTH:  1945  MEDICAL RECORD NUMBER:  [id number]    The patient is being assessed for  Admission    I agree that at least one RN has performed a thorough Head to Toe Skin Assessment on the patient. ALL assessment sites listed below have been assessed. Areas assessed by both nurses:    Head, Face, Ears, Shoulders, Back, Chest, Arms, Elbows, Hands, Sacrum. Buttock, Coccyx, Ischium, and Legs. Feet and Heels        Does the Patient have a Wound? Yes wound(s) were present on assessment.  LDA wound assessment was Initiated and completed by RN       Ned Prevention initiated by RN: Yes  Wound Care Orders initiated by RN: Yes    Pressure Injury (Stage 3,4, Unstageable, DTI, NWPT, and Complex wounds) if present, place Wound referral order by RN under : Yes    New Ostomies, if present place, Ostomy referral order under : Yes     Nurse 1 eSignature: Electronically signed by Warren Merino RN on 9/17/23 at 2:32 AM EDT    **SHARE this note so that the co-signing nurse can place an eSignature**    Nurse 2 eSignature: Electronically signed by Lelo Palomo RN on 9/17/23 at 2:41 AM EDT

## 2023-09-17 NOTE — H&P
daily, metoprolol ER 12.5 mg daily, pantoprazole  40 mg daily, and levothyroxine 0.075 mg daily. SOCIAL HISTORY:  She is nonsmoker. FAMILY MEDICAL HISTORY:  Noncontributory. REVIEW OF SYSTEMS:  SKIN:  Reveals no new or changing moles, rashes, or lesions. LUNGS:  No shortness breath, cough, or wheezing. CARDIOVASCULAR:  She has a history of atrial fibrillation and history of  chronic congestive heart failure, presently with no orthopnea, PND,  chest pain, palpitations, or worsening leg swelling. GI:  No nausea, vomiting, diarrhea, constipation, melena, or  hematochezia. :  No dysuria, hematuria, frequency, or problems with recurrent UTIs. MUSCULOSKELETAL:  As above. NEUROLOGIC:  No history of CVA or TIA symptoms. No paresthesias. No  headaches. PHYSICAL EXAMINATION:  GENERAL:  This is an elderly woman lying in bed comfortably. She is  currently in no acute distress. She does complain of bilateral lower  leg pain. VITAL SIGNS:  Stable. She is afebrile. HEENT:  Head:  Normocephalic and atraumatic. Eyes:  PERRLA. Extraocular movements are intact without nystagmus. Throat:  Oral and  buccal mucosa are moist without oral ulcer, exudate, or lesion. NECK:  No goiter, bruit, or lymphadenopathy. CHEST:  Symmetrical excursions with inspiration. BACK:  No CVA or spine tenderness. HEART:  Has an irregularly irregular rhythm consistent with atrial  fibrillation with a well-controlled heart rate. A 1/6 systolic murmur. No JVD. LUNGS:  Clear to auscultation and percussion bilaterally with no  wheezes, rubs, rales, or use of accessory respiratory muscles. ABDOMEN:  Nontender. Positive bowel sounds in all four quadrants. No  hepatosplenomegaly or other masses appreciated. EXTREMITIES:  No clubbing, cyanosis, or edema appreciated. No bruising,  deformity, or other obvious injury to her legs.   She reports leg pain  and tenderness when I touch her anywhere on her lower legs from above  her

## 2023-09-17 NOTE — PROGRESS NOTES
Called placed to Dr. Brooklynn Trujillo via answering service in regards to patients inability to urinate. Patient says she has not gone since yesterday. Now that she is receiving fluids and she still has not gone I bladder scanned her and scan shows 358cc. Awaiting call back.

## 2023-09-18 ENCOUNTER — APPOINTMENT (OUTPATIENT)
Dept: GENERAL RADIOLOGY | Age: 78
End: 2023-09-18
Payer: MEDICARE

## 2023-09-18 LAB
ALBUMIN SERPL-MCNC: 2.9 G/DL (ref 3.5–5.2)
ALP SERPL-CCNC: 80 U/L (ref 35–104)
ALT SERPL-CCNC: 14 U/L (ref 0–32)
ANION GAP SERPL CALCULATED.3IONS-SCNC: 6 MMOL/L (ref 7–16)
AST SERPL-CCNC: 13 U/L (ref 0–31)
BASOPHILS # BLD: 0.01 K/UL (ref 0–0.2)
BASOPHILS NFR BLD: 0 % (ref 0–2)
BILIRUB SERPL-MCNC: 0.2 MG/DL (ref 0–1.2)
BUN SERPL-MCNC: 25 MG/DL (ref 6–23)
CALCIUM SERPL-MCNC: 8.8 MG/DL (ref 8.6–10.2)
CHLORIDE SERPL-SCNC: 107 MMOL/L (ref 98–107)
CO2 SERPL-SCNC: 31 MMOL/L (ref 22–29)
CREAT SERPL-MCNC: 1.1 MG/DL (ref 0.5–1)
EOSINOPHIL # BLD: 0.32 K/UL (ref 0.05–0.5)
EOSINOPHILS RELATIVE PERCENT: 6 % (ref 0–6)
ERYTHROCYTE [DISTWIDTH] IN BLOOD BY AUTOMATED COUNT: 16.9 % (ref 11.5–15)
FERRITIN SERPL-MCNC: 62 NG/ML
GFR SERPL CREATININE-BSD FRML MDRD: 51 ML/MIN/1.73M2
GLUCOSE SERPL-MCNC: 90 MG/DL (ref 74–99)
HCT VFR BLD AUTO: 25 % (ref 34–48)
HGB BLD-MCNC: 7.2 G/DL (ref 11.5–15.5)
IMM GRANULOCYTES # BLD AUTO: <0.03 K/UL (ref 0–0.58)
IMM GRANULOCYTES NFR BLD: 0 % (ref 0–5)
IRON SATN MFR SERPL: 6 % (ref 15–50)
IRON SERPL-MCNC: 15 UG/DL (ref 37–145)
LYMPHOCYTES NFR BLD: 0.47 K/UL (ref 1.5–4)
LYMPHOCYTES RELATIVE PERCENT: 8 % (ref 20–42)
MCH RBC QN AUTO: 28 PG (ref 26–35)
MCHC RBC AUTO-ENTMCNC: 28.8 G/DL (ref 32–34.5)
MCV RBC AUTO: 97.3 FL (ref 80–99.9)
MONOCYTES NFR BLD: 0.54 K/UL (ref 0.1–0.95)
MONOCYTES NFR BLD: 9 % (ref 2–12)
NEUTROPHILS NFR BLD: 77 % (ref 43–80)
NEUTS SEG NFR BLD: 4.43 K/UL (ref 1.8–7.3)
PLATELET # BLD AUTO: 139 K/UL (ref 130–450)
PMV BLD AUTO: 9.4 FL (ref 7–12)
POTASSIUM SERPL-SCNC: 4.1 MMOL/L (ref 3.5–5)
PROT SERPL-MCNC: 6 G/DL (ref 6.4–8.3)
RBC # BLD AUTO: 2.57 M/UL (ref 3.5–5.5)
RBC # BLD: ABNORMAL 10*6/UL
SODIUM SERPL-SCNC: 144 MMOL/L (ref 132–146)
TIBC SERPL-MCNC: 265 UG/DL (ref 250–450)
WBC OTHER # BLD: 5.8 K/UL (ref 4.5–11.5)

## 2023-09-18 PROCEDURE — 87086 URINE CULTURE/COLONY COUNT: CPT

## 2023-09-18 PROCEDURE — 80053 COMPREHEN METABOLIC PANEL: CPT

## 2023-09-18 PROCEDURE — 71045 X-RAY EXAM CHEST 1 VIEW: CPT

## 2023-09-18 PROCEDURE — 1200000000 HC SEMI PRIVATE

## 2023-09-18 PROCEDURE — 87088 URINE BACTERIA CULTURE: CPT

## 2023-09-18 PROCEDURE — 83550 IRON BINDING TEST: CPT

## 2023-09-18 PROCEDURE — 51798 US URINE CAPACITY MEASURE: CPT

## 2023-09-18 PROCEDURE — 82728 ASSAY OF FERRITIN: CPT

## 2023-09-18 PROCEDURE — 2700000000 HC OXYGEN THERAPY PER DAY

## 2023-09-18 PROCEDURE — 6370000000 HC RX 637 (ALT 250 FOR IP): Performed by: INTERNAL MEDICINE

## 2023-09-18 PROCEDURE — 83540 ASSAY OF IRON: CPT

## 2023-09-18 PROCEDURE — 85025 COMPLETE CBC W/AUTO DIFF WBC: CPT

## 2023-09-18 RX ORDER — BUMETANIDE 1 MG/1
1 TABLET ORAL ONCE
Status: COMPLETED | OUTPATIENT
Start: 2023-09-18 | End: 2023-09-18

## 2023-09-18 RX ADMIN — METOPROLOL SUCCINATE 12.5 MG: 25 TABLET, EXTENDED RELEASE ORAL at 08:45

## 2023-09-18 RX ADMIN — BUMETANIDE 1 MG: 1 TABLET ORAL at 18:03

## 2023-09-18 RX ADMIN — ACETAMINOPHEN 1000 MG: 500 TABLET ORAL at 08:44

## 2023-09-18 RX ADMIN — AMIODARONE HYDROCHLORIDE 100 MG: 200 TABLET ORAL at 08:46

## 2023-09-18 RX ADMIN — PANTOPRAZOLE SODIUM 40 MG: 40 TABLET, DELAYED RELEASE ORAL at 05:48

## 2023-09-18 RX ADMIN — LEVOTHYROXINE SODIUM 75 MCG: 75 TABLET ORAL at 05:48

## 2023-09-18 ASSESSMENT — PAIN SCALES - GENERAL
PAINLEVEL_OUTOF10: 3
PAINLEVEL_OUTOF10: 0

## 2023-09-18 ASSESSMENT — PAIN DESCRIPTION - ONSET: ONSET: ON-GOING

## 2023-09-18 ASSESSMENT — PAIN DESCRIPTION - FREQUENCY: FREQUENCY: CONTINUOUS

## 2023-09-18 ASSESSMENT — PAIN DESCRIPTION - LOCATION: LOCATION: ABDOMEN

## 2023-09-18 ASSESSMENT — PAIN - FUNCTIONAL ASSESSMENT: PAIN_FUNCTIONAL_ASSESSMENT: ACTIVITIES ARE NOT PREVENTED

## 2023-09-18 ASSESSMENT — PAIN DESCRIPTION - DESCRIPTORS: DESCRIPTORS: ACHING

## 2023-09-18 ASSESSMENT — PAIN DESCRIPTION - ORIENTATION: ORIENTATION: MID

## 2023-09-18 ASSESSMENT — PAIN DESCRIPTION - PAIN TYPE: TYPE: SURGICAL PAIN

## 2023-09-18 NOTE — CONSULTS
GENERAL SURGERY  CONSULT NOTE  9/18/2023    Physician Consulted: Dr. Srikanth Huggins  Reason for Consult: Anemia  Referring Physician: Dr. Milagros ROUSE  Rc Egan is a 66 y.o. female who presents for evaluation of anemia with concern for GI bleed. Patient is currently admitted for multiple falls with an elevated troponin. She denies any abdominal pain, blood in her stool, nausea, vomiting. Patient's hemoglobin is currently 7.2 with what appears to be her baseline of 8-9. Patient has a history of A-fib and was previously on Eliquis but this was stopped when she had her surgery in June of this year by Dr. Srikanth Huggins. Patient had perforated diverticulitis which required her to have an exploratory laparotomy with sigmoid colectomy and end colostomy. Patient reports normal ostomy output with no complaints. She also has a history of GERD for which she takes Protonix at home. History of CHF with hepatic congestion and COPD. Labs reviewed and significant for hemoglobin of 7.2 which was 8.9 previously. BUN minimally elevated at 25, creatinine 1.1. No abdominal imaging has been completed for me to review. Of note, had patient discussion about whether she would be open to having an EGD if she needed to. Per chart review patient has declined EGD and colonoscopy in the past stating she would not want to have either of these done. Today patient is still stating she would not like to have any endoscopy procedures done.     Past Medical History:   Diagnosis Date    Atrial fibrillation with RVR ([address]) 06/07/2021    Bilateral hearing loss     Bronchiectasis with acute exacerbation (HCC)     COPD (chronic obstructive pulmonary disease) ([address]) 06/10/2021    Hepatic congestion 06/07/2021    Due to CHF    Interstitial pulmonary fibrosis (HCC)     Moderate to severe mitral regurgitation 06/08/2021    Pulmonary edema cardiac cause ([address]) 06/07/2021    Pulmonary hypertension ([address]) 21/11/3848    Systolic and diastolic CHF, acute my attending Dr. Emelina Vizcaino    Electronically signed by Teto Zee DO on 9/18/23 at 12:49 PM EDT

## 2023-09-18 NOTE — CARE COORDINATION
Pt independent from home. PCP . spoke with pt and daughter Anushka Alcocer. Pt uses no AD's at home. Had recent stay a few mos ago at [facility] for rehab; had an abd wound vac/drain at that time. Pt has colostomy; no issues with its care per daughter Anushka Alcocer. Obtains supplies through [address] [place] [address] with recent fall. Await PT/OT. Per mika. Should rehab be recommended; interested in [facility] or [facility]. NOT interested in any [facility], pt has no recent hx with [facility]. Pt also was set up with home 02 through 's office ( cannot recall agency) pt wears prn. Left VM with Dr's. office as to what agency services  her. Surgery following. Monitor hgb. Needs at d/c TBD. Will follow. Enrique Vargas. Per 's office, [facility] services pt's home 02. Enrique Vargas.

## 2023-09-18 NOTE — ACP (ADVANCE CARE PLANNING)
Advance Care Planning   Healthcare Decision Maker:    Primary Decision Maker: Doreen Mckeon child - [phone]    Toby Iqbal.

## 2023-09-18 NOTE — PROGRESS NOTES
Initial Inpatient Wound Care    Admit Date: 9/16/2023  2:42 PM    Reason for consult:  ostomy, wound to sacrum, right buttock, abd incision wound    Significant history:  adm fall land leg weakness. slid down stepson buttocks. and scooted herself back up the steps. COPD. CHf  Colostomy -Exploratory laparotomy, sigmoid colectomy, end colostomy, splenic flexure takedown, intraperitoneal drain placement, wound VAC placement 6/30/23 Dr. Kebede [place]  Wound history:  POA, surgical incision, coccyx    Findings:  alert and oriented. States daughter changes pouch at home. Every 5 or so days. Unable  Bilateral heels bright red, refuses heel protectors. States want moisturizer on them as they are dry.   Abd incision with elevated pink tissue    Sacral coccyx area- today, red/purple areas, multiple dry scabs, purple under scabs      PICTURE BELOW from 7/3/23        Interventions in place:  SOS    Plan:wound gel and mepilex applied to sacral buttocks wounds  Lo air loss ordered per [id number] [facility] [address], RN 9/18/2023 11:55 AM

## 2023-09-19 LAB
ALBUMIN SERPL-MCNC: 3.2 G/DL (ref 3.5–5.2)
ALP SERPL-CCNC: 104 U/L (ref 35–104)
ALT SERPL-CCNC: 20 U/L (ref 0–32)
ANION GAP SERPL CALCULATED.3IONS-SCNC: 7 MMOL/L (ref 7–16)
AST SERPL-CCNC: 21 U/L (ref 0–31)
BASOPHILS # BLD: 0.01 K/UL (ref 0–0.2)
BASOPHILS NFR BLD: 0 % (ref 0–2)
BILIRUB SERPL-MCNC: 0.2 MG/DL (ref 0–1.2)
BUN SERPL-MCNC: 21 MG/DL (ref 6–23)
CALCIUM SERPL-MCNC: 8.7 MG/DL (ref 8.6–10.2)
CHLORIDE SERPL-SCNC: 103 MMOL/L (ref 98–107)
CO2 SERPL-SCNC: 34 MMOL/L (ref 22–29)
CREAT SERPL-MCNC: 1 MG/DL (ref 0.5–1)
EOSINOPHIL # BLD: 0.32 K/UL (ref 0.05–0.5)
EOSINOPHILS RELATIVE PERCENT: 6 % (ref 0–6)
ERYTHROCYTE [DISTWIDTH] IN BLOOD BY AUTOMATED COUNT: 16.6 % (ref 11.5–15)
GFR SERPL CREATININE-BSD FRML MDRD: 58 ML/MIN/1.73M2
GLUCOSE SERPL-MCNC: 99 MG/DL (ref 74–99)
HCT VFR BLD AUTO: 25.6 % (ref 34–48)
HGB BLD-MCNC: 7.5 G/DL (ref 11.5–15.5)
IMM GRANULOCYTES # BLD AUTO: <0.03 K/UL (ref 0–0.58)
IMM GRANULOCYTES NFR BLD: 0 % (ref 0–5)
LYMPHOCYTES NFR BLD: 0.65 K/UL (ref 1.5–4)
LYMPHOCYTES RELATIVE PERCENT: 13 % (ref 20–42)
MCH RBC QN AUTO: 28.3 PG (ref 26–35)
MCHC RBC AUTO-ENTMCNC: 29.3 G/DL (ref 32–34.5)
MCV RBC AUTO: 96.6 FL (ref 80–99.9)
MONOCYTES NFR BLD: 0.41 K/UL (ref 0.1–0.95)
MONOCYTES NFR BLD: 8 % (ref 2–12)
NEUTROPHILS NFR BLD: 72 % (ref 43–80)
NEUTS SEG NFR BLD: 3.63 K/UL (ref 1.8–7.3)
PLATELET # BLD AUTO: 156 K/UL (ref 130–450)
PMV BLD AUTO: 10.3 FL (ref 7–12)
POTASSIUM SERPL-SCNC: 3.8 MMOL/L (ref 3.5–5)
PROT SERPL-MCNC: 6.4 G/DL (ref 6.4–8.3)
RBC # BLD AUTO: 2.65 M/UL (ref 3.5–5.5)
SODIUM SERPL-SCNC: 144 MMOL/L (ref 132–146)
WBC OTHER # BLD: 5 K/UL (ref 4.5–11.5)

## 2023-09-19 PROCEDURE — 97165 OT EVAL LOW COMPLEX 30 MIN: CPT

## 2023-09-19 PROCEDURE — 6370000000 HC RX 637 (ALT 250 FOR IP): Performed by: INTERNAL MEDICINE

## 2023-09-19 PROCEDURE — 97161 PT EVAL LOW COMPLEX 20 MIN: CPT

## 2023-09-19 PROCEDURE — 80053 COMPREHEN METABOLIC PANEL: CPT

## 2023-09-19 PROCEDURE — 1200000000 HC SEMI PRIVATE

## 2023-09-19 PROCEDURE — 97530 THERAPEUTIC ACTIVITIES: CPT

## 2023-09-19 PROCEDURE — 2580000003 HC RX 258: Performed by: INTERNAL MEDICINE

## 2023-09-19 PROCEDURE — 6360000002 HC RX W HCPCS: Performed by: INTERNAL MEDICINE

## 2023-09-19 PROCEDURE — 85025 COMPLETE CBC W/AUTO DIFF WBC: CPT

## 2023-09-19 RX ORDER — SPIRONOLACTONE 25 MG/1
12.5 TABLET ORAL DAILY
Status: DISCONTINUED | OUTPATIENT
Start: 2023-09-19 | End: 2023-09-21 | Stop reason: HOSPADM

## 2023-09-19 RX ORDER — CALCIUM CARBONATE 500 MG/1
500 TABLET, CHEWABLE ORAL 3 TIMES DAILY PRN
Status: DISCONTINUED | OUTPATIENT
Start: 2023-09-19 | End: 2023-09-21 | Stop reason: HOSPADM

## 2023-09-19 RX ADMIN — AMIODARONE HYDROCHLORIDE 100 MG: 200 TABLET ORAL at 08:47

## 2023-09-19 RX ADMIN — METOPROLOL SUCCINATE 12.5 MG: 25 TABLET, EXTENDED RELEASE ORAL at 08:47

## 2023-09-19 RX ADMIN — SODIUM CHLORIDE 25 MG: 9 INJECTION, SOLUTION INTRAVENOUS at 09:16

## 2023-09-19 RX ADMIN — SPIRONOLACTONE 12.5 MG: 25 TABLET ORAL at 08:47

## 2023-09-19 RX ADMIN — SODIUM CHLORIDE 100 MG: 9 INJECTION, SOLUTION INTRAVENOUS at 10:34

## 2023-09-19 RX ADMIN — PANTOPRAZOLE SODIUM 40 MG: 40 TABLET, DELAYED RELEASE ORAL at 06:17

## 2023-09-19 RX ADMIN — PETROLATUM: 420 OINTMENT TOPICAL at 12:30

## 2023-09-19 RX ADMIN — WATER 1000 MG: 1 INJECTION INTRAMUSCULAR; INTRAVENOUS; SUBCUTANEOUS at 12:29

## 2023-09-19 RX ADMIN — LEVOTHYROXINE SODIUM 75 MCG: 75 TABLET ORAL at 06:17

## 2023-09-19 ASSESSMENT — PAIN DESCRIPTION - LOCATION: LOCATION: ABDOMEN

## 2023-09-19 ASSESSMENT — PAIN SCALES - GENERAL
PAINLEVEL_OUTOF10: 0
PAINLEVEL_OUTOF10: 2

## 2023-09-19 NOTE — PROGRESS NOTES
2330 - patient states she is feeling better and there is clear urine output in her kate catheter bag. Will monitor overnight.

## 2023-09-19 NOTE — PROGRESS NOTES
Pt has c/o abdominal discomfort. Tylenol offered and pt refused and is requesting antacid.  PerfectServe message sent to Dr. Guanaco Rainey, Surg resident to inform of above and request medication

## 2023-09-19 NOTE — PROGRESS NOTES
OCCUPATIONAL THERAPY INITIAL EVALUATION    [address]   [address] [facility], [address] [place] [address] Drive        EPYM:                                                  Patient Name: Starr Britt    MRN: [id number]    : 1945    Room: [address] [place] [address]     Evaluating OT: Josué Song OTR/L #[id number]    Referring Provider:  Estrella Costa MD    Specific Provider Orders/Date:  OT Eval and Treat, 23     Diagnosis:   1. Elevated troponin    2. Leukocytosis, unspecified type    3.  Hypoxia         Surgery: None     Pertinent Medical History: A-fib, CHF, right hip fracture s/p repair, B hearing loss, s/p exploratory laparotomy with sigmoid colectomy and end colostomy 2023      Precautions:  Fall Risk, falls, alarm, ostomy      Assessment of current deficits    [x] Functional mobility  [x]ADLs  [x] Strength               []Cognition    [x] Functional transfers   [x] IADLs         [x] Safety Awareness   [x]Endurance    [] Fine Coordination              [x] Balance      [] Vision/perception   []Sensation     []Gross Motor Coordination  [] ROM  [] Delirium                   [] Motor Control     OT PLAN OF CARE   OT POC based on physician orders, patient diagnosis and results of clinical assessment    Frequency/Duration 1-3 days/wk for 2 weeks PRN     Specific OT Treatment Interventions to include:   * Instruction/training on adapted ADL techniques and AE recommendations to increase functional independence within precautions       * Training on energy conservation strategies, correct breathing pattern and techniques to improve independence/tolerance for self-care routine  * Functional transfer/mobility training/DME recommendations for increased independence, safety, and fall prevention  * Patient/Family education to increase follow through with safety techniques and functional independence  * Recommendation of environmental modifications for increased safety improve safety. Independent    Functional Mobility Minimal Assist with wheeled walker to improve balance few steps to pivot to chair, verbal cues for walker sequence and safety. Supervision with use of wheeled walker    Balance Sitting:     Static: fair plus     Dynamic: fair plus   Standing: fair with walker     Sitting:     Static: good    Dynamic: good  Standing: good with walker    Activity Tolerance fair      SpO2 94% on 3L at rest, decreasing to 88% with activity. HR 60s-70s throughout. No c/o dizziness/SOB. Pt does not wear O2 at baseline. Increase standing tolerance >3  minutes for improved engagement with functional transfers and indep in ADLs     Visual/  Perceptual Glasses: No     Reports changes in vision since admission: No      NA      Hand Dominance:      AROM (PROM) Strength Additional Info:  Goal:   RUE  WFL 4-/5 good  and wfl FMC/dexterity noted during ADL tasks   Improve overall RUE strength  for participation in functional tasks   LUE WFL 4-/5 good  and wfl FMC/dexterity noted during ADL tasks   Improve overall LUE strength  for participation in functional tasks     Hearing:  [facility] [place]   Sensation: Pt c/o numbness in B feet   Tone:  WFL   Edema: None    Comments: RN cleared patient for OT. Upon arrival patient in supine. Therapist facilitated and instructed pt on adapted  techniques & compensatory strategies to improve safety and independence with basic ADLs, bed mobility, functional transfers and mobility to allow pt to achieve highest level of independence and safely. Pt demonstrated fair understanding of education & follow through. At end of session, patient was in chair, alarm on and cushion in place, with call light and phone within reach, all lines and tubes intact. Overall, patient demonstrated  decreased independence and safety during completion of ADL tasks.   Pt would benefit from continued skilled OT to increase safety and independence with completion of ADL tasks

## 2023-09-19 NOTE — PROGRESS NOTES
Pt states she spoke with her daughter and has decided to have the EGD. Gen surg had signed off the case d/t pt not wanting EGD and will need notified in the Am of the above information.

## 2023-09-19 NOTE — PROGRESS NOTES
Pt informed that Dr. Milagros Sage ordered Tums for stomach upset and medication offered. Pt refused medication at this time and medication education provided.

## 2023-09-19 NOTE — CARE COORDINATION
Await PT/OT. Pt with recent HX jessa; NO return per daughter. Prefers mario or Zita. Has colostomy; [facility] Has home 02 through [place]. pt uses prn. May need DIPAK. Iv iron infusions for anemia. Monitor HGB. For EGD today. Mike Chaparro.

## 2023-09-19 NOTE — PLAN OF CARE
Problem: Pain  Goal: Verbalizes/displays adequate comfort level or baseline comfort level  Outcome: Progressing     Problem: Safety - Adult  Goal: Free from fall injury  9/19/2023 1334 by Modesto Purvis RN  Outcome: Progressing  9/19/2023 0222 by Graciela Wahl RN  Outcome: Progressing

## 2023-09-19 NOTE — PROGRESS NOTES
PerfectServe message received from Dr. Gina Cervantes stating surgery team signed off and to inform primary of abd discomfort. Call placed to Dr. Maria Ines Clemons office. Message left with staff to inform of abd discomfort and pt requesting medication.  Awaiting return call

## 2023-09-19 NOTE — PROGRESS NOTES
Physical Therapy  Facility/Department: [address] MED SURG/TELE  Physical Therapy Initial Assessment    Name: Dashawn Palomares  : 1945  MRN: [id number]  Date of Service: 2023               Patient Diagnosis(es): The primary encounter diagnosis was Elevated troponin. Diagnoses of Leukocytosis, unspecified type and Hypoxia were also pertinent to this visit. Past Medical History:  has a past medical history of Atrial fibrillation with RVR ([address]), Bilateral hearing loss, Bronchiectasis with acute exacerbation ([address]), COPD (chronic obstructive pulmonary disease) ([address]), Hepatic congestion, Interstitial pulmonary fibrosis (HCC), Moderate to severe mitral regurgitation, Pulmonary edema cardiac cause ([address]), Pulmonary hypertension ([address]), and Systolic and diastolic CHF, acute on chronic ([address]). Past Surgical History:  has a past surgical history that includes fracture surgery () and laparotomy (N/A, 2023). Requires PT Follow-Up: Yes     Evaluating Therapist: Lenny Moise PT     Referring Provider:  Nelda Diaz DO    PT order : PT eval and treat     Room #: 314   DIAGNOSIS: The primary encounter diagnosis was Elevated troponin. Diagnoses of Leukocytosis, unspecified type and Hypoxia were also pertinent to this visit. PRECAUTIONS: falls, O2, [place]      Social:  Pt lives with  daughter  in a  second  floor apartment , 5+ 13  steps and 1  rails to enter. Prior to admission pt walked with  no AD     Initial Evaluation  Date:  2023  Treatment      Short Term/ Long Term   Goals   Was pt agreeable to Eval/treatment? Yes      Does pt have pain?   None reported      Bed Mobility  Rolling:  NT   Supine to sit:  SBA   Sit to supine:  min assist   Scooting:  min assist in sit   S/I    Transfers Sit to stand:  min assist   Stand to sit: min assist   Stand pivot: NT    SBA    Ambulation     15  feet with ww  with  min assist      feet with  AAD  with  SBA        Stair negotiation: ascended

## 2023-09-19 NOTE — PROGRESS NOTES
2230 - patient complains of lower abdominal discomfort. She thinks she is not urinating. Patient has kate catheter. Catheter bag empty at present as it was emptied earlier. Bladder scanned patient and got 80 cc. Flushed catheter with 60 cc normal saline and 60 cc was returned. Advised patient that we will monitor her output.

## 2023-09-20 ENCOUNTER — ANESTHESIA EVENT (OUTPATIENT)
Dept: ENDOSCOPY | Age: 78
End: 2023-09-20
Payer: MEDICARE

## 2023-09-20 ENCOUNTER — ANESTHESIA (OUTPATIENT)
Dept: ENDOSCOPY | Age: 78
End: 2023-09-20
Payer: MEDICARE

## 2023-09-20 LAB
ALBUMIN SERPL-MCNC: 3.4 G/DL (ref 3.5–5.2)
ALP SERPL-CCNC: 105 U/L (ref 35–104)
ALT SERPL-CCNC: 15 U/L (ref 0–32)
ANION GAP SERPL CALCULATED.3IONS-SCNC: 6 MMOL/L (ref 7–16)
AST SERPL-CCNC: 14 U/L (ref 0–31)
BASOPHILS # BLD: 0.02 K/UL (ref 0–0.2)
BASOPHILS NFR BLD: 0 % (ref 0–2)
BILIRUB SERPL-MCNC: 0.2 MG/DL (ref 0–1.2)
BUN SERPL-MCNC: 21 MG/DL (ref 6–23)
CALCIUM SERPL-MCNC: 9.1 MG/DL (ref 8.6–10.2)
CHLORIDE SERPL-SCNC: 102 MMOL/L (ref 98–107)
CO2 SERPL-SCNC: 36 MMOL/L (ref 22–29)
CREAT SERPL-MCNC: 0.8 MG/DL (ref 0.5–1)
EOSINOPHIL # BLD: 0.23 K/UL (ref 0.05–0.5)
EOSINOPHILS RELATIVE PERCENT: 5 % (ref 0–6)
ERYTHROCYTE [DISTWIDTH] IN BLOOD BY AUTOMATED COUNT: 16.4 % (ref 11.5–15)
GFR SERPL CREATININE-BSD FRML MDRD: >60 ML/MIN/1.73M2
GLUCOSE SERPL-MCNC: 93 MG/DL (ref 74–99)
HCT VFR BLD AUTO: 28.5 % (ref 34–48)
HGB BLD-MCNC: 8 G/DL (ref 11.5–15.5)
LYMPHOCYTES NFR BLD: 0.67 K/UL (ref 1.5–4)
LYMPHOCYTES RELATIVE PERCENT: 15 % (ref 20–42)
MCH RBC QN AUTO: 27.9 PG (ref 26–35)
MCHC RBC AUTO-ENTMCNC: 28.1 G/DL (ref 32–34.5)
MCV RBC AUTO: 99.3 FL (ref 80–99.9)
MICROORGANISM SPEC CULT: ABNORMAL
MONOCYTES NFR BLD: 0.57 K/UL (ref 0.1–0.95)
MONOCYTES NFR BLD: 13 % (ref 2–12)
NEUTROPHILS NFR BLD: 67 % (ref 43–80)
NEUTS SEG NFR BLD: 3.05 K/UL (ref 1.8–7.3)
PLATELET # BLD AUTO: 178 K/UL (ref 130–450)
PMV BLD AUTO: 9.9 FL (ref 7–12)
POTASSIUM SERPL-SCNC: 3.8 MMOL/L (ref 3.5–5)
PROT SERPL-MCNC: 6.4 G/DL (ref 6.4–8.3)
RBC # BLD AUTO: 2.87 M/UL (ref 3.5–5.5)
RBC # BLD: ABNORMAL 10*6/UL
SODIUM SERPL-SCNC: 144 MMOL/L (ref 132–146)
SPECIMEN DESCRIPTION: ABNORMAL
WBC OTHER # BLD: 4.6 K/UL (ref 4.5–11.5)

## 2023-09-20 PROCEDURE — 85025 COMPLETE CBC W/AUTO DIFF WBC: CPT

## 2023-09-20 PROCEDURE — 0DJ08ZZ INSPECTION OF UPPER INTESTINAL TRACT, VIA NATURAL OR ARTIFICIAL OPENING ENDOSCOPIC: ICD-10-PCS | Performed by: SURGERY

## 2023-09-20 PROCEDURE — 7100000011 HC PHASE II RECOVERY - ADDTL 15 MIN: Performed by: SURGERY

## 2023-09-20 PROCEDURE — 6360000002 HC RX W HCPCS: Performed by: NURSE ANESTHETIST, CERTIFIED REGISTERED

## 2023-09-20 PROCEDURE — 3700000000 HC ANESTHESIA ATTENDED CARE: Performed by: SURGERY

## 2023-09-20 PROCEDURE — 6360000002 HC RX W HCPCS: Performed by: INTERNAL MEDICINE

## 2023-09-20 PROCEDURE — 2709999900 HC NON-CHARGEABLE SUPPLY: Performed by: SURGERY

## 2023-09-20 PROCEDURE — 7100000010 HC PHASE II RECOVERY - FIRST 15 MIN: Performed by: SURGERY

## 2023-09-20 PROCEDURE — 6360000002 HC RX W HCPCS

## 2023-09-20 PROCEDURE — 80053 COMPREHEN METABOLIC PANEL: CPT

## 2023-09-20 PROCEDURE — 6370000000 HC RX 637 (ALT 250 FOR IP): Performed by: INTERNAL MEDICINE

## 2023-09-20 PROCEDURE — 97530 THERAPEUTIC ACTIVITIES: CPT

## 2023-09-20 PROCEDURE — 2700000000 HC OXYGEN THERAPY PER DAY

## 2023-09-20 PROCEDURE — 2580000003 HC RX 258: Performed by: NURSE ANESTHETIST, CERTIFIED REGISTERED

## 2023-09-20 PROCEDURE — 3609017100 HC EGD: Performed by: SURGERY

## 2023-09-20 PROCEDURE — 1200000000 HC SEMI PRIVATE

## 2023-09-20 PROCEDURE — 2580000003 HC RX 258: Performed by: INTERNAL MEDICINE

## 2023-09-20 PROCEDURE — 97535 SELF CARE MNGMENT TRAINING: CPT

## 2023-09-20 RX ORDER — SODIUM CHLORIDE 9 MG/ML
INJECTION, SOLUTION INTRAVENOUS CONTINUOUS PRN
Status: DISCONTINUED | OUTPATIENT
Start: 2023-09-20 | End: 2023-09-20 | Stop reason: SDUPTHER

## 2023-09-20 RX ORDER — PROPOFOL 10 MG/ML
INJECTION, EMULSION INTRAVENOUS PRN
Status: DISCONTINUED | OUTPATIENT
Start: 2023-09-20 | End: 2023-09-20 | Stop reason: SDUPTHER

## 2023-09-20 RX ORDER — HYDRALAZINE HYDROCHLORIDE 20 MG/ML
INJECTION INTRAMUSCULAR; INTRAVENOUS
Status: COMPLETED
Start: 2023-09-20 | End: 2023-09-20

## 2023-09-20 RX ORDER — HYDRALAZINE HYDROCHLORIDE 20 MG/ML
10 INJECTION INTRAMUSCULAR; INTRAVENOUS ONCE
Status: COMPLETED | OUTPATIENT
Start: 2023-09-20 | End: 2023-09-20

## 2023-09-20 RX ORDER — SODIUM CHLORIDE, SODIUM LACTATE, POTASSIUM CHLORIDE, CALCIUM CHLORIDE 600; 310; 30; 20 MG/100ML; MG/100ML; MG/100ML; MG/100ML
INJECTION, SOLUTION INTRAVENOUS CONTINUOUS
Status: DISCONTINUED | OUTPATIENT
Start: 2023-09-20 | End: 2023-09-21 | Stop reason: HOSPADM

## 2023-09-20 RX ADMIN — LEVOTHYROXINE SODIUM 75 MCG: 75 TABLET ORAL at 05:22

## 2023-09-20 RX ADMIN — PANTOPRAZOLE SODIUM 40 MG: 40 TABLET, DELAYED RELEASE ORAL at 05:22

## 2023-09-20 RX ADMIN — HYDRALAZINE HYDROCHLORIDE 10 MG: 20 INJECTION INTRAMUSCULAR; INTRAVENOUS at 14:56

## 2023-09-20 RX ADMIN — AMIODARONE HYDROCHLORIDE 100 MG: 200 TABLET ORAL at 07:39

## 2023-09-20 RX ADMIN — PROPOFOL 50 MG: 10 INJECTION, EMULSION INTRAVENOUS at 14:12

## 2023-09-20 RX ADMIN — METOPROLOL SUCCINATE 12.5 MG: 25 TABLET, EXTENDED RELEASE ORAL at 07:39

## 2023-09-20 RX ADMIN — HYDRALAZINE HYDROCHLORIDE 10 MG: 20 INJECTION, SOLUTION INTRAMUSCULAR; INTRAVENOUS at 14:56

## 2023-09-20 RX ADMIN — SODIUM CHLORIDE: 9 INJECTION, SOLUTION INTRAVENOUS at 14:08

## 2023-09-20 RX ADMIN — SODIUM CHLORIDE, POTASSIUM CHLORIDE, SODIUM LACTATE AND CALCIUM CHLORIDE: 600; 310; 30; 20 INJECTION, SOLUTION INTRAVENOUS at 06:27

## 2023-09-20 RX ADMIN — WATER 1000 MG: 1 INJECTION INTRAMUSCULAR; INTRAVENOUS; SUBCUTANEOUS at 11:30

## 2023-09-20 ASSESSMENT — PAIN - FUNCTIONAL ASSESSMENT: PAIN_FUNCTIONAL_ASSESSMENT: 0-10

## 2023-09-20 ASSESSMENT — LIFESTYLE VARIABLES: SMOKING_STATUS: 0

## 2023-09-20 ASSESSMENT — PAIN SCALES - GENERAL: PAINLEVEL_OUTOF10: 0

## 2023-09-20 NOTE — ANESTHESIA PRE PROCEDURE
Department of Anesthesiology  Preprocedure Note       Name:  Estuardo Nava   Age:  66 y.o.  :  1945                                          MRN:  [id number]         Date:  2023      Surgeon: Nancy Baldwin):  Kathryn Ibrahim MD    Procedure: Procedure(s):  EGD ESOPHAGOGASTRODUODENOSCOPY    Medications prior to admission:   Prior to Admission medications    Medication Sig Start Date End Date Taking? Authorizing Provider   albuterol (PROVENTIL) (2.5 MG/3ML) 0.083% nebulizer solution Take 3 mLs by nebulization every 4 hours  Patient not taking: Reported on 2023   Efrain Yao, DO   amiodarone (PACERONE) 100 MG tablet Take 1 tablet by mouth daily 23   Efrain Yao, DO   bumetanide (BUMEX) 0.5 MG tablet Take 1 tablet by mouth three times a week 7/10/23   Lizet Yao, DO   arformoterol tartrate (BROVANA) 15 MCG/2ML NEBU Take 2 mLs by nebulization in the morning and 2 mLs in the evening. Patient not taking: Reported on 2023   Lizet Yao, DO   spironolactone (ALDACTONE) 25 MG tablet Take 0.5 tablets by mouth daily  Patient not taking: Reported on 2023   Efrain Yao, DO   white petrolatum OINT ointment Apply topically 2 times daily 23   Lizet Yao, DO   metoprolol succinate (TOPROL XL) 25 MG extended release tablet Take 0.5 tablets by mouth daily 23   Lizet Yao, DO   ipratropium 0.5 mg-albuterol 2.5 mg (DUONEB) 0.5-2.5 (3) MG/3ML SOLN nebulizer solution Inhale 3 mLs into the lungs every 4 hours as needed for Shortness of Breath  Patient not taking: Reported on 2023   Efrain Yao, DO   pantoprazole (PROTONIX) 40 MG tablet Take 1 tablet by mouth daily 3/9/23   Vona Schilder, DO   levothyroxine (SYNTHROID) 50 MCG tablet Take 1.5 tablets by mouth daily 21   Historical Provider, MD       Current medications:    No current facility-administered medications for this visit.      No current outpatient medications

## 2023-09-20 NOTE — PROGRESS NOTES
Occupational Therapy  OT BEDSIDE TREATMENT NOTE      Date:2023  Patient Name: Starr Britt  MRN: [id number]  : 1945  Room: [address] [place] [address]     Evaluating OT: Josué Song OTR/L #[id number]     Referring Provider:  Estrella Costa MD     Specific Provider Orders/Date:  OT Eval and Treat, 23      Diagnosis:   1. Elevated troponin    2. Leukocytosis, unspecified type    3.  Hypoxia         Surgery: None      Pertinent Medical History: A-fib, CHF, right hip fracture s/p repair, B hearing loss, s/p exploratory laparotomy with sigmoid colectomy and end colostomy 2023      Precautions:  Fall Risk, falls, alarm, ostomy       Assessment of current deficits    [x] Functional mobility            [x]ADLs           [x] Strength                   []Cognition    [x] Functional transfers          [x] IADLs          [x] Safety Awareness   [x]Endurance    [] Fine Coordination              [x] Balance      [] Vision/perception    []Sensation      []Gross Motor Coordination  [] ROM           [] Delirium                   [] Motor Control      OT PLAN OF CARE   OT POC based on physician orders, patient diagnosis and results of clinical assessment     Frequency/Duration 1-3 days/wk for 2 weeks PRN      Specific OT Treatment Interventions to include:   * Instruction/training on adapted ADL techniques and AE recommendations to increase functional independence within precautions       * Training on energy conservation strategies, correct breathing pattern and techniques to improve independence/tolerance for self-care routine  * Functional transfer/mobility training/DME recommendations for increased independence, safety, and fall prevention  * Patient/Family education to increase follow through with safety techniques and functional independence  * Recommendation of environmental modifications for increased safety with functional transfers/mobility and ADLs  * Cognitive retraining/development of therapeutic activities to lift RLE into bed, completed x 2 during session  Supine to sit: Independent   Sit to supine: Independent    Functional Transfers Sit to stand: Minimal Assist   Stand to sit: Minimal Assist      Transfer training with verbal cues for hand placement to improve safety. sit-stand: MIN A from [facility] (completed x 3) and from [facility]  Education provided on safe hand placement Independent    Functional Mobility Minimal Assist with wheeled walker to improve balance few steps to pivot to chair, verbal cues for walker sequence and safety. MIN A with FWW, completed pivot from [facility] to [facility] and from [facility] to transport cart Supervision with use of wheeled walker    Balance Sitting:     Static: fair plus     Dynamic: fair plus   Standing: fair with walker      sitting: SBA  Standing: MIN A Sitting:     Static: good    Dynamic: good  Standing: good with walker    Activity Tolerance fair       SpO2 94% on 3L at rest, decreasing to 88% with activity. HR 60s-70s throughout. No c/o dizziness/SOB. Pt does not wear O2 at baseline. fair tolerance. Patient on 3L O2, at rest 100%  Patient insisted on removing nasal cannula to sit on BSC with O2 quickly at 73%, placed nasal cannula back with patient recovering in approximately 30 seconds to 94%. Education provided for patient to leave O2 on for all activity as she reports she typically takes it off to use the [facility] Increase standing tolerance >3  minutes for improved engagement with functional transfers and indep in ADLs      Visual/  Perceptual Glasses: No      Reports changes in vision since admission: No       NA        Comments: RN approved patient's participation in [employer] activities. Upon arrival, patient supine, agreeable for OT treatment. Patient participated in bed mobility and dynamic reaching tasks to improve balance required for self care with fair tolerance. Patient completed standing task but was unable to put R heel flat on ground, weight shifting complete with good result.

## 2023-09-20 NOTE — ANESTHESIA POSTPROCEDURE EVALUATION
Department of Anesthesiology  Postprocedure Note    Patient: Allen Salinas  MRN: [id number]  YOB: 1945  Date of evaluation: 9/20/2023      Procedure Summary     Date: 09/20/23 Room / Location: [facility] / [facility]    Anesthesia Start: 8890 Anesthesia Stop: 1989    Procedure: EGD ESOPHAGOGASTRODUODENOSCOPY Diagnosis:       Anemia, unspecified type      (Anemia, unspecified type [D64.9])    Surgeons: Per Rubi MD Responsible Provider: Manuela Boles DO    Anesthesia Type: general ASA Status: 3          Anesthesia Type: No value filed.     Therese Phase I:      Therese Phase II:        Anesthesia Post Evaluation    Patient location during evaluation: bedside  Patient participation: complete - patient participated  Level of consciousness: awake and alert  Airway patency: patent  Nausea & Vomiting: no nausea and no vomiting  Complications: no  Cardiovascular status: blood pressure returned to baseline  Respiratory status: acceptable  Hydration status: euvolemic

## 2023-09-20 NOTE — CARE COORDINATION
2023  Social Work Discharge Plannin completed.  Electronically signed by NICKY Coates on 2023 at 10:58 AM

## 2023-09-20 NOTE — PROGRESS NOTES
Notified rounding surgical residents that patient has changed her mind and would like to have the EGD. Residents advised that we do not need to call Dr. Enzo Colon with this information. Dr. Raphael Ma has made the patient NPO this am and advised surgical residents of this information.

## 2023-09-20 NOTE — CARE COORDINATION
Pt for EGD today; [facility] 16/24; referral made to [facility]; accepted, will initiate precert. Transport forms [place]. [place] [address]. Daughter mika aware; states can transport pt herself to facility if needed. Will follow. Cheyenne Busch. Per tawanna at [facility];pt approved for [facility] ; auth good until Midnite Friday 9/22. if not discharged by Friday, will need to re-authorize with insurance. Matilda Lin RN,CM.

## 2023-09-20 NOTE — CONSULTS
[address] [facility]   Inpatient CHF Nurse Navigator Consult          Eliza Parson is a 66 y.o. (1945) female with a history of HFpEF, most recent EF:  Lab Results   Component Value Date    LVEF 58 07/01/2023       Patient was awake and alert, laying in bed during the consultation and is agreeable to heart failure education. She was engaged and asked appropriate questions throughout the education session. She is waiting for an EGD later today.      Barriers identified during consult contributing to HF Hospitalization:  [] Limited medication adherence   [] Poor health literacy, education regarding HF medications provided   [] Pill box provided to patient  [] Difficulty affording medications  [] Prescription assistance information given     [] Not weighing themselves daily  [x] Weight log provided for easy monitoring  [] Scale provided     [] Not following low sodium diet  [] Food insecurity   [x] 2 gram sodium diet education provided   [] Low sodium recipes provided  [] Sodium free seasoning provided   [] Low sodium meal delivery options given to patient  [] Dietician consulted     [] Lack of transportation to appointments     [] Depression, given chronic illness  [] Primary team notified     [] Goals of care need addressed  [] Palliative care consulted     [] CHF CHW consulted, to assist with       Chart Reviewed:  Diet: Diet NPO Exceptions are: Sips of Water with Meds   Daily Weights: Patient Vitals for the past 96 hrs (Last 3 readings):   Weight   09/20/23 0445 98 lb 3.2 oz (44.5 kg)   09/19/23 0208 93 lb 14.4 oz (42.6 kg)   09/18/23 0413 95 lb 14.4 oz (43.5 kg)     I/O:   Intake/Output Summary (Last 24 hours) at 9/20/2023 1034  Last data filed at 9/20/2023 0404  Gross per 24 hour   Intake 180 ml   Output 800 ml   Net -620 ml       [] Nursing staff/manager notified of inaccurate wiggins weights or I/O      Discharge Plan:  Above identified barriers reviewed and needs

## 2023-09-20 NOTE — PROGRESS NOTES
Per nursing patient has changed her mind and would like an endoscopy at this time. Consent placed for EGD for today. Ensure that patient is n.p.o. with IVF for endoscopy.     Ranulfo Shin  Surgical Resident PGY-1

## 2023-09-20 NOTE — DISCHARGE INSTRUCTIONS
HEART FAILURE  / CONGESTIVE HEART FAILURE  DISCHARGE INSTRUCTIONS:  GUIDELINES TO FOLLOW AT HOME    Self- Managed Care:     MEDICATIONS:  Take your medication as directed. If you are experiencing any side effects, inform your doctor, Do not stop taking any of your medications without letting your doctor know. Check with your doctor before taking any over-the-counter medications / herbal / or dietary supplements. They may interfere with your other medications. Do not take ibuprofen (Advil or Motrin) and naproxen (Aleve) without talking to your doctor first. They could make your heart failure worse. WEIGHT MONITORING:   Weigh yourself everyday (with the same scale) around the same time of the day and write it down. (you can chart them on a calendar or keep track of them on paper. Notify your doctor of a weight gain of 3 pounds or more in 1 day   OR a total of 5 pounds or more in 1 week    Take your weight record to your doctor visits  Also, the same goes if you loose more than 3# in one day, let your heart doctor know. DIET:   Cardiac heart healthy diet- Low saturated / low trans fat, no added salt, caffeine restricted, Low sodium diet-   No more than 2,000mg (2 grams) of salt / sodium per day (which equals to a little less than  a teaspoon of salt)  If your doctor wants you on a fluid restriction. ..it is usually recommended a fluid limit of 2,000cc -  Fluid restriction- 2,000 ml (milliliters) = 64 ounces = you can have 8 glasses of fluid per day (each glass 8 ounces)    Follow a low salt diet - avoid using salt at the table, avoid / limit use of canned soups, processed / packaged foods, salted snacks, olives and pickles. Do not use a salt substitute without checking with your doctor, they may contain a high amount of potassioum. (Mrs. Henson Soulier is safe to use).     Limit the use of alcohol       CALL YOUR DOCTOR THE FIRST DAY YOU NOTICE ANY OF THESE   SYMPTOMS:  You have a doctor whether you need another dose. My Goal for Self-management of Heart Failure Includes 5 steps :    1. Notice a change in symptoms ( weight gain, short of breath, leg swelling, decreased activity level, bloating. ...)    2. Evaluate the change: (use the Heart Failure Zones )     3. Decide to take action: decide what your options are, such as: (call your doctor for an extra visit, take a prescribed medication, such as your water pill if your doctor has given you directions to do so, [facility])    4. Come up with a strategy:  (now you call the doctor for advice / appointment. This is where you take action!!! Do not wait, catch the symptom early and treat it before it worsens. 5. Evaluate the response: The next day, check your Heart Failure Zones: are you in the GREEN ZONE (safe zone)? Worsening symptoms of YELLOW ZONE? Or have you moved to the RED ZONE and need to call 911 or go to the Emergency Room for evaluation? Call your doctor's office to update them on your symptoms of heart failure.

## 2023-09-21 VITALS
HEIGHT: 67 IN | SYSTOLIC BLOOD PRESSURE: 164 MMHG | WEIGHT: 86.1 LBS | BODY MASS INDEX: 13.51 KG/M2 | HEART RATE: 66 BPM | TEMPERATURE: 98.1 F | RESPIRATION RATE: 16 BRPM | DIASTOLIC BLOOD PRESSURE: 77 MMHG | OXYGEN SATURATION: 95 %

## 2023-09-21 LAB
ALBUMIN SERPL-MCNC: 3.4 G/DL (ref 3.5–5.2)
ALP SERPL-CCNC: 99 U/L (ref 35–104)
ALT SERPL-CCNC: 14 U/L (ref 0–32)
ANION GAP SERPL CALCULATED.3IONS-SCNC: 9 MMOL/L (ref 7–16)
AST SERPL-CCNC: 13 U/L (ref 0–31)
BILIRUB SERPL-MCNC: 0.3 MG/DL (ref 0–1.2)
BUN SERPL-MCNC: 18 MG/DL (ref 6–23)
CALCIUM SERPL-MCNC: 9.2 MG/DL (ref 8.6–10.2)
CHLORIDE SERPL-SCNC: 100 MMOL/L (ref 98–107)
CO2 SERPL-SCNC: 34 MMOL/L (ref 22–29)
CREAT SERPL-MCNC: 0.6 MG/DL (ref 0.5–1)
GFR SERPL CREATININE-BSD FRML MDRD: >60 ML/MIN/1.73M2
GLUCOSE SERPL-MCNC: 97 MG/DL (ref 74–99)
POTASSIUM SERPL-SCNC: 3.8 MMOL/L (ref 3.5–5)
PROT SERPL-MCNC: 6.6 G/DL (ref 6.4–8.3)
SODIUM SERPL-SCNC: 143 MMOL/L (ref 132–146)

## 2023-09-21 PROCEDURE — 2700000000 HC OXYGEN THERAPY PER DAY

## 2023-09-21 PROCEDURE — 80053 COMPREHEN METABOLIC PANEL: CPT

## 2023-09-21 PROCEDURE — 6370000000 HC RX 637 (ALT 250 FOR IP): Performed by: INTERNAL MEDICINE

## 2023-09-21 RX ORDER — CEFDINIR 300 MG/1
300 CAPSULE ORAL EVERY 12 HOURS SCHEDULED
Qty: 20 CAPSULE | Refills: 0 | Status: SHIPPED | OUTPATIENT
Start: 2023-09-21 | End: 2023-10-01

## 2023-09-21 RX ORDER — CEFDINIR 300 MG/1
300 CAPSULE ORAL EVERY 12 HOURS SCHEDULED
Status: DISCONTINUED | OUTPATIENT
Start: 2023-09-21 | End: 2023-09-21 | Stop reason: HOSPADM

## 2023-09-21 RX ADMIN — AMIODARONE HYDROCHLORIDE 100 MG: 200 TABLET ORAL at 08:27

## 2023-09-21 RX ADMIN — PANTOPRAZOLE SODIUM 40 MG: 40 TABLET, DELAYED RELEASE ORAL at 05:13

## 2023-09-21 RX ADMIN — LEVOTHYROXINE SODIUM 75 MCG: 75 TABLET ORAL at 05:13

## 2023-09-21 RX ADMIN — CEFDINIR 300 MG: 300 CAPSULE ORAL at 08:27

## 2023-09-21 RX ADMIN — METOPROLOL SUCCINATE 12.5 MG: 25 TABLET, EXTENDED RELEASE ORAL at 08:26

## 2023-09-21 NOTE — DISCHARGE INSTR - COC
Continuity of Care Form    Patient Name: Mary Martínez   :  1945  MRN:  [id number]    Admit date:  2023  Discharge date:  ***    Code Status Order: Full Code   Advance Directives:   Advance Care Flowsheet Documentation       Date/Time Healthcare Directive Type of Healthcare Directive Copy in [address] [place] [address] Agent's Name Healthcare Agent's Phone Number    [phone] No, patient does not have an advance directive for healthcare treatment -- -- -- -- --            Admitting Physician:  Danielle Marcial DO  PCP: Danielle Marcial DO    Discharging Nurse: [facility] Unit/Room#: 0206/3288-E  Discharging Unit Phone Number: ***    Emergency Contact:   Extended Emergency Contact Information  Primary Emergency Contact: [address] [facility]  Mobile Phone: [phone]  Relation: Child    Past Surgical History:  Past Surgical History:   Procedure Laterality Date    FRACTURE SURGERY      right hip    LAPAROTOMY N/A 2023    EXPLORATORY LAPAROTOMY,SIGMOID COLECTOMY,  TAKEDOWN OF SPLENIC FLEXURE, COLOSTOMY, WOUND VAC PLACEMENT performed by Evan Sweeney MD at [address] [facility] N/A 2023    EGD ESOPHAGOGASTRODUODENOSCOPY performed by Evan Sweeney MD at [address] [facility] History: There is no immunization history on file for this patient.     Active Problems:  Patient Active Problem List   Diagnosis Code    Intertrochanteric fracture ([address] [facility]) S72.143A    Irregular cardiac rhythm I49.9    Atrial fibrillation with RVR ([facility]) I48.91    Pulmonary edema cardiac cause ([facility]) I50.1    Hepatic congestion K76.1    Bilateral hearing loss H91.93    Moderate to severe mitral regurgitation Y20.6    Systolic and diastolic CHF, acute on chronic (HCC) I50.43    COPD (chronic obstructive pulmonary disease) ([facility]) J44.9    Pulmonary hypertension ([facility]) I27.20    Bronchiectasis with acute exacerbation ([facility]) J47.1    Interstitial

## 2023-09-21 NOTE — PLAN OF CARE
Problem: Discharge Planning  Goal: Discharge to home or other facility with appropriate resources  Outcome: Progressing     Problem: Safety - Adult  Goal: Free from fall injury  9/21/2023 0954 by Camila Mclean RN  Outcome: Progressing  9/20/2023 2341 by Nicole Marti RN  Outcome: Progressing

## 2023-09-21 NOTE — CARE COORDINATION
For d/c today. Transport forms on chart; 51176 completed. [employer] to arrange transport. (Facility to )Pt has been approved. staff/ daughter/pt aware. Kassie Hess.

## 2023-09-27 NOTE — PROGRESS NOTES
Physician Progress Note      PATIENT:               Geeta Lyles  CSN #:                  [id number]  :                       1945  ADMIT DATE:       2023 2:42 PM  [address] [facility] DATE:        2023 11:29 AM  RESPONDING  PROVIDER #:        Maricruz Egan DO          QUERY TEXT:    Pt admitted with weakness and chronic CHF documented and acute on chronic CHF   documented. If possible, please document in progress notes and discharge   summary further specificity regarding the type and acuity of CHF:    The medical record reflects the following:  Risk Factors: Hx HFpEF  Clinical Indicators:  progress note \"Chronic HFpEF-\",  progress note   \"Acute on chronic HFpEF\", CXR  \"No acute cardiopulmonary pathology seen\",   CXR on  \"Small bilateral pleural effusions. \"  Treatment: PO Bumex, Spirolactone PO, CXR x2. Thank you,  Inocente TATE, RN, [place] [phone]  Options provided:  -- Chronic Diastolic CHF/HFpEF  -- Acute on Chronic Diastolic CHF/HFpEF, POA  -- Acute on Chronic Diastolic CHF/HFpEF, developed during admission  -- Other - I will add my own diagnosis  -- Disagree - Not applicable / Not valid  -- Disagree - Clinically unable to determine / Unknown  -- Refer to Clinical Documentation Reviewer    PROVIDER RESPONSE TEXT:    This patient is in acute on chronic diastolic CHF/HFpEF, developed during   admission. Query created by: Inocente Husain on 2023 12:50 PM      QUERY TEXT:    Pt admitted with weakness and fall. Pt noted to have ABLA. If possible, please   document in progress notes and discharge summary the cause of the fall and   weakness. The medical record reflects the following:  Risk Factors: Gastritis, Hx diverticulitis, UTI. Clinical Indicators: H&P \" Leg weakness as above. She does have some   demonstrated weakness. \",  progress note \"Acute blood loss anemia with iron   deficiency- Hb stable Bit up but she is dry today Dropped from 10 to 7.0 in   less than week\", EGD

## 2024-03-26 ENCOUNTER — APPOINTMENT (OUTPATIENT)
Dept: CT IMAGING | Age: 79
End: 2024-03-26
Payer: MEDICARE

## 2024-03-26 ENCOUNTER — HOSPITAL ENCOUNTER (EMERGENCY)
Age: 79
Discharge: HOME | End: 2024-03-26
Attending: EMERGENCY MEDICINE
Payer: MEDICARE

## 2024-03-26 VITALS
SYSTOLIC BLOOD PRESSURE: 169 MMHG | RESPIRATION RATE: 16 BRPM | DIASTOLIC BLOOD PRESSURE: 88 MMHG | WEIGHT: 84 LBS | HEART RATE: 53 BPM | TEMPERATURE: 97.7 F | OXYGEN SATURATION: 93 % | HEIGHT: 67 IN | BODY MASS INDEX: 13.18 KG/M2

## 2024-03-26 DIAGNOSIS — K59.00 CONSTIPATION, UNSPECIFIED CONSTIPATION TYPE: Primary | ICD-10-CM

## 2024-03-26 LAB
ALBUMIN SERPL-MCNC: 3.9 G/DL (ref 3.5–5.2)
ALP SERPL-CCNC: 170 U/L (ref 35–104)
ALT SERPL-CCNC: 11 U/L (ref 0–32)
ANION GAP SERPL CALCULATED.3IONS-SCNC: 8 MMOL/L (ref 7–16)
AST SERPL-CCNC: 18 U/L (ref 0–31)
BACTERIA URNS QL MICRO: ABNORMAL
BASOPHILS # BLD: 0.01 K/UL (ref 0–0.2)
BASOPHILS NFR BLD: 0 % (ref 0–2)
BILIRUB DIRECT SERPL-MCNC: <0.2 MG/DL (ref 0–0.3)
BILIRUB INDIRECT SERPL-MCNC: ABNORMAL MG/DL (ref 0–1)
BILIRUB SERPL-MCNC: 0.3 MG/DL (ref 0–1.2)
BILIRUB UR QL STRIP: NEGATIVE
BUN SERPL-MCNC: 13 MG/DL (ref 6–23)
CALCIUM SERPL-MCNC: 9.1 MG/DL (ref 8.6–10.2)
CHLORIDE SERPL-SCNC: 99 MMOL/L (ref 98–107)
CLARITY UR: CLEAR
CO2 SERPL-SCNC: 36 MMOL/L (ref 22–29)
COLOR UR: YELLOW
CREAT SERPL-MCNC: 0.8 MG/DL (ref 0.5–1)
EOSINOPHIL # BLD: 0.03 K/UL (ref 0.05–0.5)
EOSINOPHILS RELATIVE PERCENT: 0 % (ref 0–6)
ERYTHROCYTE [DISTWIDTH] IN BLOOD BY AUTOMATED COUNT: 15.7 % (ref 11.5–15)
GFR SERPL CREATININE-BSD FRML MDRD: 77 ML/MIN/1.73M2
GLUCOSE SERPL-MCNC: 85 MG/DL (ref 74–99)
GLUCOSE UR STRIP-MCNC: NEGATIVE MG/DL
HCT VFR BLD AUTO: 36.1 % (ref 34–48)
HGB BLD-MCNC: 10.7 G/DL (ref 11.5–15.5)
HGB UR QL STRIP.AUTO: ABNORMAL
IMM GRANULOCYTES # BLD AUTO: 0.04 K/UL (ref 0–0.58)
IMM GRANULOCYTES NFR BLD: 1 % (ref 0–5)
KETONES UR STRIP-MCNC: NEGATIVE MG/DL
LACTATE BLDV-SCNC: 0.7 MMOL/L (ref 0.5–2.2)
LEUKOCYTE ESTERASE UR QL STRIP: NEGATIVE
LIPASE SERPL-CCNC: 8 U/L (ref 13–60)
LYMPHOCYTES NFR BLD: 0.56 K/UL (ref 1.5–4)
LYMPHOCYTES RELATIVE PERCENT: 7 % (ref 20–42)
MCH RBC QN AUTO: 28.5 PG (ref 26–35)
MCHC RBC AUTO-ENTMCNC: 29.6 G/DL (ref 32–34.5)
MCV RBC AUTO: 96.3 FL (ref 80–99.9)
MONOCYTES NFR BLD: 0.44 K/UL (ref 0.1–0.95)
MONOCYTES NFR BLD: 6 % (ref 2–12)
NEUTROPHILS NFR BLD: 87 % (ref 43–80)
NEUTS SEG NFR BLD: 6.95 K/UL (ref 1.8–7.3)
NITRITE UR QL STRIP: NEGATIVE
PH UR STRIP: 7.5 [PH] (ref 5–9)
PLATELET # BLD AUTO: 330 K/UL (ref 130–450)
PMV BLD AUTO: 8.7 FL (ref 7–12)
POTASSIUM SERPL-SCNC: 3.3 MMOL/L (ref 3.5–5)
PROT SERPL-MCNC: 7.3 G/DL (ref 6.4–8.3)
PROT UR STRIP-MCNC: NEGATIVE MG/DL
RBC # BLD AUTO: 3.75 M/UL (ref 3.5–5.5)
RBC # BLD: ABNORMAL 10*6/UL
RBC #/AREA URNS HPF: ABNORMAL /HPF
SODIUM SERPL-SCNC: 143 MMOL/L (ref 132–146)
SP GR UR STRIP: 1.01 (ref 1–1.03)
UROBILINOGEN UR STRIP-ACNC: 0.2 EU/DL (ref 0–1)
WBC #/AREA URNS HPF: ABNORMAL /HPF
WBC OTHER # BLD: 8 K/UL (ref 4.5–11.5)

## 2024-03-26 PROCEDURE — 83605 ASSAY OF LACTIC ACID: CPT

## 2024-03-26 PROCEDURE — 83690 ASSAY OF LIPASE: CPT

## 2024-03-26 PROCEDURE — 85025 COMPLETE CBC W/AUTO DIFF WBC: CPT

## 2024-03-26 PROCEDURE — 6370000000 HC RX 637 (ALT 250 FOR IP)

## 2024-03-26 PROCEDURE — 2580000003 HC RX 258

## 2024-03-26 PROCEDURE — 74177 CT ABD & PELVIS W/CONTRAST: CPT

## 2024-03-26 PROCEDURE — 80053 COMPREHEN METABOLIC PANEL: CPT

## 2024-03-26 PROCEDURE — 81001 URINALYSIS AUTO W/SCOPE: CPT

## 2024-03-26 PROCEDURE — 82248 BILIRUBIN DIRECT: CPT

## 2024-03-26 PROCEDURE — 99285 EMERGENCY DEPT VISIT HI MDM: CPT

## 2024-03-26 PROCEDURE — 6360000004 HC RX CONTRAST MEDICATION: Performed by: RADIOLOGY

## 2024-03-26 RX ORDER — HYDROCODONE BITARTRATE AND ACETAMINOPHEN 5; 325 MG/1; MG/1
1 TABLET ORAL ONCE
Status: DISCONTINUED | OUTPATIENT
Start: 2024-03-26 | End: 2024-03-26 | Stop reason: HOSPADM

## 2024-03-26 RX ORDER — POLYETHYLENE GLYCOL 3350 17 G/17G
17 POWDER, FOR SOLUTION ORAL DAILY
Qty: 30 PACKET | Refills: 0 | Status: SHIPPED | OUTPATIENT
Start: 2024-03-26 | End: 2024-04-25

## 2024-03-26 RX ORDER — POTASSIUM CHLORIDE 20 MEQ/1
40 TABLET, EXTENDED RELEASE ORAL ONCE
Status: COMPLETED | OUTPATIENT
Start: 2024-03-26 | End: 2024-03-26

## 2024-03-26 RX ORDER — 0.9 % SODIUM CHLORIDE 0.9 %
1000 INTRAVENOUS SOLUTION INTRAVENOUS ONCE
Status: COMPLETED | OUTPATIENT
Start: 2024-03-26 | End: 2024-03-26

## 2024-03-26 RX ADMIN — POTASSIUM CHLORIDE 40 MEQ: 1500 TABLET, EXTENDED RELEASE ORAL at 16:54

## 2024-03-26 RX ADMIN — SODIUM CHLORIDE 1000 ML: 9 INJECTION, SOLUTION INTRAVENOUS at 15:31

## 2024-03-26 RX ADMIN — IOPAMIDOL 75 ML: 755 INJECTION, SOLUTION INTRAVENOUS at 16:30

## 2024-03-26 ASSESSMENT — PAIN DESCRIPTION - DESCRIPTORS: DESCRIPTORS: ACHING

## 2024-03-26 ASSESSMENT — PAIN DESCRIPTION - ORIENTATION: ORIENTATION: RIGHT

## 2024-03-26 ASSESSMENT — PAIN - FUNCTIONAL ASSESSMENT: PAIN_FUNCTIONAL_ASSESSMENT: 0-10

## 2024-03-26 ASSESSMENT — PAIN DESCRIPTION - LOCATION: LOCATION: ABDOMEN

## 2024-03-26 ASSESSMENT — PAIN DESCRIPTION - DIRECTION: RADIATING_TOWARDS: RIGHT FLANK

## 2024-03-26 ASSESSMENT — PAIN SCALES - GENERAL: PAINLEVEL_OUTOF10: 4

## 2024-03-26 NOTE — DISCHARGE INSTRUCTIONS
Please return to ED if symptoms worsen, persist, or occur.  Please follow-up with PCP and general surgery.  Please take your medications as prescribed.    CT ABDOMEN PELVIS W IV CONTRAST Additional Contrast? None   Final Result   1.  Status post sigmoidectomy.      2.  Left-sided colostomy.  Presence of nonobstructive parastomal hernia para   on the colostomy loop with signs for constipation throughout the colon but no   indication form a obstruction of the ostomy loop.      3.  Dilatation of the biliary tree and pancreatic ductal system can be   addition evaluated MRCP on routine basis.      4.  Mild the left-sided pleural effusion.      5.  No indication for acute intraperitoneal or retro peritoneal process in   the abdomen or in the pelvis.

## 2024-03-26 NOTE — ED PROVIDER NOTES
Department of Emergency Medicine     Written by: Matt Tse MD  Patient Name: Rubia Rico  Admit Date: 3/26/2024  2:44 PM  MRN: [id number]                   : 1945    HPI  Chief Complaint   Patient presents with    Abdominal Pain     Constant Right sided abd pain since Friday    Flank Pain     Right side.     Nausea       Rubia Rico is a 79 y.o. female that presents to the ED with concerns for right lower quadrant pain.  Patient says that the symptoms are going on for 2 to 3 months.  However the last few days has been worse.  She has a history of left-sided colostomy placed for bowel perforation last .  It was placed by Dr. Perez.  She has no dysuria, she has nausea however no vomiting.  No fevers chills diaphoresis.  Daughter at bedside, assist with some history.  No chest pain or shortness of breath.  Her stool output has been appropriate.     Review of systems:  Pertinent positives and negatives mentioned in the HPI/MDM.    Physical Exam  Constitutional:       General: She is not in acute distress.     Appearance: Normal appearance.   Eyes:      Extraocular Movements: Extraocular movements intact.      Pupils: Pupils are equal, round, and reactive to light.   Cardiovascular:      Rate and Rhythm: Normal rate and regular rhythm.   Pulmonary:      Effort: Pulmonary effort is normal. No respiratory distress.   Abdominal:      Palpations: Abdomen is soft.      Tenderness: There is abdominal tenderness in the right lower quadrant and suprapubic area.      Comments: Left-sided colostomy bag, normal stool output   Skin:     General: Skin is warm and dry.   Neurological:      Mental Status: She is alert and oriented to person, place, and time. Mental status is at baseline.          Chart review: The patient was admitted for elevated troponin and leukocytosis on     Social determinants: family at bedside, social support available    Acute or chronic illness limiting or

## 2024-04-06 ENCOUNTER — APPOINTMENT (OUTPATIENT)
Dept: GENERAL RADIOLOGY | Age: 79
DRG: 291 | End: 2024-04-06
Payer: MEDICARE

## 2024-04-06 ENCOUNTER — APPOINTMENT (OUTPATIENT)
Dept: CT IMAGING | Age: 79
DRG: 291 | End: 2024-04-06
Payer: MEDICARE

## 2024-04-06 ENCOUNTER — HOSPITAL ENCOUNTER (INPATIENT)
Age: 79
LOS: 4 days | Discharge: HOME | DRG: 291 | End: 2024-04-11
Attending: EMERGENCY MEDICINE | Admitting: HOSPITALIST
Payer: MEDICARE

## 2024-04-06 DIAGNOSIS — J96.01 ACUTE RESPIRATORY FAILURE WITH HYPOXIA AND HYPERCAPNIA (HCC): Primary | ICD-10-CM

## 2024-04-06 DIAGNOSIS — J96.02 ACUTE HYPERCAPNIC RESPIRATORY FAILURE (HCC): ICD-10-CM

## 2024-04-06 DIAGNOSIS — J96.02 ACUTE RESPIRATORY FAILURE WITH HYPOXIA AND HYPERCAPNIA (HCC): Primary | ICD-10-CM

## 2024-04-06 DIAGNOSIS — I50.43 SYSTOLIC AND DIASTOLIC CHF, ACUTE ON CHRONIC (HCC): ICD-10-CM

## 2024-04-06 LAB
ALBUMIN SERPL-MCNC: 3.9 G/DL (ref 3.5–5.2)
ALP SERPL-CCNC: 162 U/L (ref 35–104)
ALT SERPL-CCNC: 11 U/L (ref 0–32)
ANION GAP SERPL CALCULATED.3IONS-SCNC: 2 MMOL/L (ref 7–16)
AST SERPL-CCNC: 18 U/L (ref 0–31)
ATYPICAL LYMPHOCYTE ABSOLUTE COUNT: 0.08 K/UL (ref 0–0.46)
ATYPICAL LYMPHOCYTES: 2 % (ref 0–4)
B.E.: 8.7 MMOL/L (ref -3–3)
BASOPHILS # BLD: 0.08 K/UL (ref 0–0.2)
BASOPHILS NFR BLD: 2 % (ref 0–2)
BILIRUB SERPL-MCNC: 0.3 MG/DL (ref 0–1.2)
BNP SERPL-MCNC: 704 PG/ML (ref 0–450)
BUN SERPL-MCNC: 14 MG/DL (ref 6–23)
CALCIUM SERPL-MCNC: 9.1 MG/DL (ref 8.6–10.2)
CHLORIDE SERPL-SCNC: 97 MMOL/L (ref 98–107)
CO2 SERPL-SCNC: 42 MMOL/L (ref 22–29)
COHB: 1 % (ref 0–1.5)
CREAT SERPL-MCNC: 0.7 MG/DL (ref 0.5–1)
CRITICAL: ABNORMAL
D DIMER: 235 NG/ML DDU (ref 0–232)
DATE ANALYZED: ABNORMAL
DATE OF COLLECTION: ABNORMAL
EOSINOPHIL # BLD: 0.04 K/UL (ref 0.05–0.5)
EOSINOPHILS RELATIVE PERCENT: 1 % (ref 0–6)
ERYTHROCYTE [DISTWIDTH] IN BLOOD BY AUTOMATED COUNT: 15.2 % (ref 11.5–15)
GFR SERPL CREATININE-BSD FRML MDRD: 86 ML/MIN/1.73M2
GLUCOSE SERPL-MCNC: 97 MG/DL (ref 74–99)
HCO3: 36.9 MMOL/L (ref 22–26)
HCT VFR BLD AUTO: 35.8 % (ref 34–48)
HGB BLD-MCNC: 10.1 G/DL (ref 11.5–15.5)
HHB: 1.2 % (ref 0–5)
INFLUENZA A BY PCR: NOT DETECTED
INFLUENZA B BY PCR: NOT DETECTED
LAB: ABNORMAL
LYMPHOCYTES NFR BLD: 0.37 K/UL (ref 1.5–4)
LYMPHOCYTES RELATIVE PERCENT: 8 % (ref 20–42)
Lab: 2345
MCH RBC QN AUTO: 29 PG (ref 26–35)
MCHC RBC AUTO-ENTMCNC: 28.2 G/DL (ref 32–34.5)
MCV RBC AUTO: 102.9 FL (ref 80–99.9)
METHB: 0.3 % (ref 0–1.5)
MODE: ABNORMAL
MONOCYTES NFR BLD: 0.41 K/UL (ref 0.1–0.95)
MONOCYTES NFR BLD: 9 % (ref 2–12)
NEUTROPHILS NFR BLD: 79 % (ref 43–80)
NEUTS SEG NFR BLD: 3.72 K/UL (ref 1.8–7.3)
O2 CONTENT: 15.3 ML/DL
O2 SATURATION: 98.8 % (ref 92–98.5)
O2HB: 97.5 % (ref 94–97)
OPERATOR ID: 2485
PATIENT TEMP: 37 C
PCO2: 73.3 MMHG (ref 35–45)
PH BLOOD GAS: 7.32 (ref 7.35–7.45)
PLATELET # BLD AUTO: 266 K/UL (ref 130–450)
PMV BLD AUTO: 8.7 FL (ref 7–12)
PO2: 138.2 MMHG (ref 75–100)
POTASSIUM SERPL-SCNC: 4.7 MMOL/L (ref 3.5–5)
PROT SERPL-MCNC: 7.1 G/DL (ref 6.4–8.3)
RBC # BLD AUTO: 3.48 M/UL (ref 3.5–5.5)
RBC # BLD: ABNORMAL 10*6/UL
SARS-COV-2 RDRP RESP QL NAA+PROBE: NOT DETECTED
SODIUM SERPL-SCNC: 141 MMOL/L (ref 132–146)
SOURCE, BLOOD GAS: ABNORMAL
SPECIMEN DESCRIPTION: NORMAL
THB: 11 G/DL (ref 11.5–16.5)
TIME ANALYZED: 2354
TROPONIN I SERPL HS-MCNC: 15 NG/L (ref 0–9)
TROPONIN I SERPL HS-MCNC: 17 NG/L (ref 0–9)
WBC OTHER # BLD: 4.7 K/UL (ref 4.5–11.5)

## 2024-04-06 PROCEDURE — 87635 SARS-COV-2 COVID-19 AMP PRB: CPT

## 2024-04-06 PROCEDURE — 99285 EMERGENCY DEPT VISIT HI MDM: CPT

## 2024-04-06 PROCEDURE — 85379 FIBRIN DEGRADATION QUANT: CPT

## 2024-04-06 PROCEDURE — 0202U NFCT DS 22 TRGT SARS-COV-2: CPT

## 2024-04-06 PROCEDURE — 82805 BLOOD GASES W/O2 SATURATION: CPT

## 2024-04-06 PROCEDURE — 6370000000 HC RX 637 (ALT 250 FOR IP)

## 2024-04-06 PROCEDURE — 94640 AIRWAY INHALATION TREATMENT: CPT

## 2024-04-06 PROCEDURE — 71275 CT ANGIOGRAPHY CHEST: CPT

## 2024-04-06 PROCEDURE — 96374 THER/PROPH/DIAG INJ IV PUSH: CPT

## 2024-04-06 PROCEDURE — 83880 ASSAY OF NATRIURETIC PEPTIDE: CPT

## 2024-04-06 PROCEDURE — 6360000002 HC RX W HCPCS

## 2024-04-06 PROCEDURE — 80053 COMPREHEN METABOLIC PANEL: CPT

## 2024-04-06 PROCEDURE — 85025 COMPLETE CBC W/AUTO DIFF WBC: CPT

## 2024-04-06 PROCEDURE — 84484 ASSAY OF TROPONIN QUANT: CPT

## 2024-04-06 PROCEDURE — 87502 INFLUENZA DNA AMP PROBE: CPT

## 2024-04-06 PROCEDURE — 71045 X-RAY EXAM CHEST 1 VIEW: CPT

## 2024-04-06 PROCEDURE — 93005 ELECTROCARDIOGRAM TRACING: CPT

## 2024-04-06 PROCEDURE — 6360000004 HC RX CONTRAST MEDICATION: Performed by: RADIOLOGY

## 2024-04-06 RX ORDER — IPRATROPIUM BROMIDE AND ALBUTEROL SULFATE 2.5; .5 MG/3ML; MG/3ML
3 SOLUTION RESPIRATORY (INHALATION) ONCE
Status: COMPLETED | OUTPATIENT
Start: 2024-04-06 | End: 2024-04-06

## 2024-04-06 RX ORDER — METHYLPREDNISOLONE SODIUM SUCCINATE 125 MG/2ML
125 INJECTION, POWDER, LYOPHILIZED, FOR SOLUTION INTRAMUSCULAR; INTRAVENOUS ONCE
Status: COMPLETED | OUTPATIENT
Start: 2024-04-06 | End: 2024-04-06

## 2024-04-06 RX ADMIN — IOPAMIDOL 75 ML: 755 INJECTION, SOLUTION INTRAVENOUS at 21:10

## 2024-04-06 RX ADMIN — IPRATROPIUM BROMIDE AND ALBUTEROL SULFATE 3 DOSE: 2.5; .5 SOLUTION RESPIRATORY (INHALATION) at 20:45

## 2024-04-06 RX ADMIN — METHYLPREDNISOLONE SODIUM SUCCINATE 125 MG: 125 INJECTION INTRAMUSCULAR; INTRAVENOUS at 19:56

## 2024-04-06 ASSESSMENT — PAIN SCALES - GENERAL: PAINLEVEL_OUTOF10: 4

## 2024-04-06 ASSESSMENT — PAIN - FUNCTIONAL ASSESSMENT: PAIN_FUNCTIONAL_ASSESSMENT: 0-10

## 2024-04-06 ASSESSMENT — PAIN DESCRIPTION - ORIENTATION: ORIENTATION: POSTERIOR

## 2024-04-06 ASSESSMENT — PAIN DESCRIPTION - LOCATION: LOCATION: BACK

## 2024-04-06 ASSESSMENT — PAIN DESCRIPTION - PAIN TYPE: TYPE: ACUTE PAIN

## 2024-04-06 NOTE — ED NOTES
O2 sat 87% on room air: O2 2LPM via nasal cannula applied. Patient is on O2 1.5-2lpm via nasal cannula at home prn.  O2 sat 90%: O2 increased to 3lpm.

## 2024-04-06 NOTE — ED PROVIDER NOTES
Diagnosis Date    Atrial fibrillation with RVR (HCC) 2021    Bilateral hearing loss     Bronchiectasis with acute exacerbation (HCC)     COPD (chronic obstructive pulmonary disease) (HCC) 06/10/2021    Hepatic congestion 2021    Due to CHF    Interstitial pulmonary fibrosis (HCC)     Moderate to severe mitral regurgitation 2021    Pulmonary edema cardiac cause (HCC) 2021    Pulmonary hypertension (HCC) 2021    Systolic and diastolic CHF, acute on chronic (HCC) 2021       Past Surgical History:       Procedure Laterality Date    FRACTURE SURGERY      right hip    LAPAROTOMY N/A 2023    EXPLORATORY LAPAROTOMY,SIGMOID COLECTOMY,  TAKEDOWN OF SPLENIC FLEXURE, COLOSTOMY, WOUND VAC PLACEMENT performed by Meir MART MD at [facility] OR    UPPER GASTROINTESTINAL ENDOSCOPY N/A 2023    EGD ESOPHAGOGASTRODUODENOSCOPY performed by Meir MART MD at [facility] ENDOSCOPY       Social History:  reports that she has never smoked. She has never used smokeless tobacco. She reports that she does not drink alcohol and does not use drugs.    Family History:       Problem Relation Age of Onset    Heart Attack Mother          age 80    Other Father          age 83 unknown cause        The patient’s home medications have been reviewed.  Prior to Admission medications    Medication Sig Start Date End Date Taking? Authorizing Provider   polyethylene glycol (MIRALAX) 17 g packet Take 1 packet by mouth daily 3/26/24 4/25/24  Matt Tse MD   amiodarone (PACERONE) 100 MG tablet Take 1 tablet by mouth daily 23   Lizet Yao DO   bumetanide (BUMEX) 0.5 MG tablet Take 1 tablet by mouth three times a week 7/10/23   Lizet Yao DO   white petrolatum OINT ointment Apply topically 2 times daily 23   Lizet Yao DO   metoprolol succinate (TOPROL XL) 25 MG extended release tablet Take 0.5 tablets by mouth daily 23   Lizet Yao DO  Plain radiographic images are visualized and preliminarily interpreted by the ED Provider with the below findings:    CXR with no obvious effusions or consolidations concerning pna, no ptx     Interpretation per the Radiologist below, if available at the time of this note:    CTA PULMONARY W CONTRAST   Final Result   1.  No indication for acute pulmonary emboli.      2.  There are findings to indicate congestive heart failure.      3.  Findings for hemochromatosis of the liver.      4.  Ectasia of the thoracic aorta although the diameter is no larger than 4   cm but appears to be out of proportion for the patient body habitus but this   is stable back to the study of June 2021.  No dissection of the thoracic   aorta.      5.  Chronic scar seen in the upper lobes bilaterally as observed previously   with the fibrosis retraction and traction bronchiectasis particular seen in   the right upper lobe.  Stable right upper lobe nodule.  Consider follow-up   study time interval of 1 year from the present examination for monitoring the   fibrosis in the upper lobes which has minute confluent densities.      5.  No indication for superimposed acute pulmonary process.         XR CHEST PORTABLE   Final Result   1. No acute process.   2. Stable cardiomegaly.           No results found.    No results found.    Procedures:      ------------------------- NURSING NOTES AND VITALS REVIEWED ---------------------------   The nursing notes within the ED encounter and vital signs as below have been reviewed by myself and ED attending.  BP (!) 189/90   Pulse 58   Temp 98.1 °F (36.7 °C) (Oral)   Resp 16   Wt 37.6 kg (83 lb)   SpO2 95%   BMI 13.00 kg/m²   Oxygen Saturation Interpretation: Abnormal - but at baseline    The patient’s available past medical records and past encounters were reviewed by myself and ED attending    ------------------------------ ED COURSE/MEDICAL DECISION MAKING----------------------    Medical Decision

## 2024-04-07 PROBLEM — J96.02 ACUTE HYPERCAPNIC RESPIRATORY FAILURE (HCC): Status: ACTIVE | Noted: 2024-04-07

## 2024-04-07 PROBLEM — D62 ACUTE BLOOD LOSS ANEMIA: Status: RESOLVED | Noted: 2023-03-08 | Resolved: 2024-04-07

## 2024-04-07 LAB
ALBUMIN SERPL-MCNC: 3.9 G/DL (ref 3.5–5.2)
ALP SERPL-CCNC: 165 U/L (ref 35–104)
ALT SERPL-CCNC: 12 U/L (ref 0–32)
ANION GAP SERPL CALCULATED.3IONS-SCNC: 8 MMOL/L (ref 7–16)
AST SERPL-CCNC: 16 U/L (ref 0–31)
B PARAP IS1001 DNA NPH QL NAA+NON-PROBE: NOT DETECTED
B PERT DNA SPEC QL NAA+PROBE: NOT DETECTED
BASOPHILS # BLD: 0 K/UL (ref 0–0.2)
BASOPHILS NFR BLD: 0 % (ref 0–2)
BILIRUB SERPL-MCNC: 0.2 MG/DL (ref 0–1.2)
BUN SERPL-MCNC: 17 MG/DL (ref 6–23)
C PNEUM DNA NPH QL NAA+NON-PROBE: NOT DETECTED
CALCIUM SERPL-MCNC: 9.2 MG/DL (ref 8.6–10.2)
CHLORIDE SERPL-SCNC: 96 MMOL/L (ref 98–107)
CO2 SERPL-SCNC: 37 MMOL/L (ref 22–29)
CREAT SERPL-MCNC: 0.7 MG/DL (ref 0.5–1)
EOSINOPHIL # BLD: 0 K/UL (ref 0.05–0.5)
EOSINOPHILS RELATIVE PERCENT: 0 % (ref 0–6)
ERYTHROCYTE [DISTWIDTH] IN BLOOD BY AUTOMATED COUNT: 15.1 % (ref 11.5–15)
FLUAV RNA NPH QL NAA+NON-PROBE: NOT DETECTED
FLUBV RNA NPH QL NAA+NON-PROBE: NOT DETECTED
GFR SERPL CREATININE-BSD FRML MDRD: 86 ML/MIN/1.73M2
GLUCOSE SERPL-MCNC: 146 MG/DL (ref 74–99)
HADV DNA NPH QL NAA+NON-PROBE: NOT DETECTED
HCOV 229E RNA NPH QL NAA+NON-PROBE: NOT DETECTED
HCOV HKU1 RNA NPH QL NAA+NON-PROBE: NOT DETECTED
HCOV NL63 RNA NPH QL NAA+NON-PROBE: NOT DETECTED
HCOV OC43 RNA NPH QL NAA+NON-PROBE: NOT DETECTED
HCT VFR BLD AUTO: 35.4 % (ref 34–48)
HGB BLD-MCNC: 10.2 G/DL (ref 11.5–15.5)
HMPV RNA NPH QL NAA+NON-PROBE: NOT DETECTED
HPIV1 RNA NPH QL NAA+NON-PROBE: NOT DETECTED
HPIV2 RNA NPH QL NAA+NON-PROBE: NOT DETECTED
HPIV3 RNA NPH QL NAA+NON-PROBE: NOT DETECTED
HPIV4 RNA NPH QL NAA+NON-PROBE: NOT DETECTED
LYMPHOCYTES NFR BLD: 0.12 K/UL (ref 1.5–4)
LYMPHOCYTES RELATIVE PERCENT: 4 % (ref 20–42)
M PNEUMO DNA NPH QL NAA+NON-PROBE: NOT DETECTED
MCH RBC QN AUTO: 28.7 PG (ref 26–35)
MCHC RBC AUTO-ENTMCNC: 28.8 G/DL (ref 32–34.5)
MCV RBC AUTO: 99.4 FL (ref 80–99.9)
MONOCYTES NFR BLD: 0.02 K/UL (ref 0.1–0.95)
MONOCYTES NFR BLD: 1 % (ref 2–12)
NEUTROPHILS NFR BLD: 95 % (ref 43–80)
NEUTS SEG NFR BLD: 2.56 K/UL (ref 1.8–7.3)
PLATELET # BLD AUTO: 278 K/UL (ref 130–450)
PMV BLD AUTO: 9.3 FL (ref 7–12)
POTASSIUM SERPL-SCNC: 4.5 MMOL/L (ref 3.5–5)
PROT SERPL-MCNC: 7.3 G/DL (ref 6.4–8.3)
RBC # BLD AUTO: 3.56 M/UL (ref 3.5–5.5)
RBC # BLD: ABNORMAL 10*6/UL
RSV RNA NPH QL NAA+NON-PROBE: NOT DETECTED
RV+EV RNA NPH QL NAA+NON-PROBE: NOT DETECTED
SARS-COV-2 RNA NPH QL NAA+NON-PROBE: NOT DETECTED
SODIUM SERPL-SCNC: 141 MMOL/L (ref 132–146)
SPECIMEN DESCRIPTION: NORMAL
WBC OTHER # BLD: 2.7 K/UL (ref 4.5–11.5)

## 2024-04-07 PROCEDURE — 80053 COMPREHEN METABOLIC PANEL: CPT

## 2024-04-07 PROCEDURE — 94640 AIRWAY INHALATION TREATMENT: CPT

## 2024-04-07 PROCEDURE — 94660 CPAP INITIATION&MGMT: CPT

## 2024-04-07 PROCEDURE — 85025 COMPLETE CBC W/AUTO DIFF WBC: CPT

## 2024-04-07 PROCEDURE — 6370000000 HC RX 637 (ALT 250 FOR IP): Performed by: NURSE PRACTITIONER

## 2024-04-07 PROCEDURE — 2580000003 HC RX 258: Performed by: NURSE PRACTITIONER

## 2024-04-07 PROCEDURE — 6360000002 HC RX W HCPCS: Performed by: INTERNAL MEDICINE

## 2024-04-07 PROCEDURE — 6360000002 HC RX W HCPCS: Performed by: HOSPITALIST

## 2024-04-07 PROCEDURE — 2060000000 HC ICU INTERMEDIATE R&B

## 2024-04-07 PROCEDURE — 2700000000 HC OXYGEN THERAPY PER DAY

## 2024-04-07 PROCEDURE — 5A09357 ASSISTANCE WITH RESPIRATORY VENTILATION, LESS THAN 24 CONSECUTIVE HOURS, CONTINUOUS POSITIVE AIRWAY PRESSURE: ICD-10-PCS | Performed by: INTERNAL MEDICINE

## 2024-04-07 RX ORDER — ACETAMINOPHEN 650 MG/1
650 SUPPOSITORY RECTAL EVERY 6 HOURS PRN
Status: DISCONTINUED | OUTPATIENT
Start: 2024-04-07 | End: 2024-04-11 | Stop reason: HOSPADM

## 2024-04-07 RX ORDER — SENNOSIDES A AND B 8.6 MG/1
1 TABLET, FILM COATED ORAL DAILY PRN
Status: DISCONTINUED | OUTPATIENT
Start: 2024-04-07 | End: 2024-04-11 | Stop reason: HOSPADM

## 2024-04-07 RX ORDER — BUMETANIDE 1 MG/1
0.5 TABLET ORAL
Status: DISCONTINUED | OUTPATIENT
Start: 2024-04-08 | End: 2024-04-07

## 2024-04-07 RX ORDER — FUROSEMIDE 10 MG/ML
40 INJECTION INTRAMUSCULAR; INTRAVENOUS 2 TIMES DAILY
Status: DISCONTINUED | OUTPATIENT
Start: 2024-04-07 | End: 2024-04-08

## 2024-04-07 RX ORDER — METOPROLOL SUCCINATE 25 MG/1
12.5 TABLET, EXTENDED RELEASE ORAL DAILY
Status: DISCONTINUED | OUTPATIENT
Start: 2024-04-07 | End: 2024-04-11 | Stop reason: HOSPADM

## 2024-04-07 RX ORDER — PANTOPRAZOLE SODIUM 40 MG/1
40 TABLET, DELAYED RELEASE ORAL DAILY
Status: DISCONTINUED | OUTPATIENT
Start: 2024-04-07 | End: 2024-04-11 | Stop reason: HOSPADM

## 2024-04-07 RX ORDER — METHYLPREDNISOLONE SODIUM SUCCINATE 40 MG/ML
40 INJECTION, POWDER, LYOPHILIZED, FOR SOLUTION INTRAMUSCULAR; INTRAVENOUS EVERY 6 HOURS
Status: DISCONTINUED | OUTPATIENT
Start: 2024-04-07 | End: 2024-04-09

## 2024-04-07 RX ORDER — IPRATROPIUM BROMIDE AND ALBUTEROL SULFATE 2.5; .5 MG/3ML; MG/3ML
1 SOLUTION RESPIRATORY (INHALATION)
Status: DISCONTINUED | OUTPATIENT
Start: 2024-04-07 | End: 2024-04-11 | Stop reason: HOSPADM

## 2024-04-07 RX ORDER — SODIUM CHLORIDE 9 MG/ML
INJECTION, SOLUTION INTRAVENOUS PRN
Status: DISCONTINUED | OUTPATIENT
Start: 2024-04-07 | End: 2024-04-11 | Stop reason: HOSPADM

## 2024-04-07 RX ORDER — LEVOTHYROXINE SODIUM 0.07 MG/1
75 TABLET ORAL
Status: DISCONTINUED | OUTPATIENT
Start: 2024-04-07 | End: 2024-04-11 | Stop reason: HOSPADM

## 2024-04-07 RX ORDER — SODIUM CHLORIDE 0.9 % (FLUSH) 0.9 %
10 SYRINGE (ML) INJECTION EVERY 12 HOURS SCHEDULED
Status: DISCONTINUED | OUTPATIENT
Start: 2024-04-07 | End: 2024-04-11 | Stop reason: HOSPADM

## 2024-04-07 RX ORDER — HEPARIN SODIUM 10000 [USP'U]/ML
5000 INJECTION, SOLUTION INTRAVENOUS; SUBCUTANEOUS EVERY 8 HOURS
Status: DISCONTINUED | OUTPATIENT
Start: 2024-04-07 | End: 2024-04-10 | Stop reason: DRUGHIGH

## 2024-04-07 RX ORDER — SODIUM CHLORIDE 0.9 % (FLUSH) 0.9 %
10 SYRINGE (ML) INJECTION PRN
Status: DISCONTINUED | OUTPATIENT
Start: 2024-04-07 | End: 2024-04-11 | Stop reason: HOSPADM

## 2024-04-07 RX ORDER — MAGNESIUM SULFATE IN WATER 40 MG/ML
2000 INJECTION, SOLUTION INTRAVENOUS PRN
Status: DISCONTINUED | OUTPATIENT
Start: 2024-04-07 | End: 2024-04-11 | Stop reason: HOSPADM

## 2024-04-07 RX ORDER — POLYETHYLENE GLYCOL 3350 17 G/17G
17 POWDER, FOR SOLUTION ORAL DAILY
Status: DISCONTINUED | OUTPATIENT
Start: 2024-04-07 | End: 2024-04-11 | Stop reason: HOSPADM

## 2024-04-07 RX ORDER — AMIODARONE HYDROCHLORIDE 200 MG/1
100 TABLET ORAL DAILY
Status: DISCONTINUED | OUTPATIENT
Start: 2024-04-07 | End: 2024-04-09

## 2024-04-07 RX ORDER — ONDANSETRON 2 MG/ML
4 INJECTION INTRAMUSCULAR; INTRAVENOUS EVERY 6 HOURS PRN
Status: DISCONTINUED | OUTPATIENT
Start: 2024-04-07 | End: 2024-04-11 | Stop reason: HOSPADM

## 2024-04-07 RX ORDER — ENOXAPARIN SODIUM 100 MG/ML
30 INJECTION SUBCUTANEOUS DAILY
Status: DISCONTINUED | OUTPATIENT
Start: 2024-04-07 | End: 2024-04-07

## 2024-04-07 RX ORDER — ONDANSETRON 4 MG/1
4 TABLET, ORALLY DISINTEGRATING ORAL EVERY 8 HOURS PRN
Status: DISCONTINUED | OUTPATIENT
Start: 2024-04-07 | End: 2024-04-11 | Stop reason: HOSPADM

## 2024-04-07 RX ORDER — POTASSIUM CHLORIDE 20 MEQ/1
40 TABLET, EXTENDED RELEASE ORAL PRN
Status: DISCONTINUED | OUTPATIENT
Start: 2024-04-07 | End: 2024-04-11 | Stop reason: HOSPADM

## 2024-04-07 RX ORDER — SACUBITRIL AND VALSARTAN 24; 26 MG/1; MG/1
1 TABLET, FILM COATED ORAL 2 TIMES DAILY
COMMUNITY

## 2024-04-07 RX ORDER — ACETAMINOPHEN 325 MG/1
650 TABLET ORAL EVERY 6 HOURS PRN
Status: DISCONTINUED | OUTPATIENT
Start: 2024-04-07 | End: 2024-04-11 | Stop reason: HOSPADM

## 2024-04-07 RX ORDER — POTASSIUM CHLORIDE 7.45 MG/ML
10 INJECTION INTRAVENOUS PRN
Status: DISCONTINUED | OUTPATIENT
Start: 2024-04-07 | End: 2024-04-11 | Stop reason: HOSPADM

## 2024-04-07 RX ADMIN — METOPROLOL SUCCINATE 12.5 MG: 25 TABLET, EXTENDED RELEASE ORAL at 09:10

## 2024-04-07 RX ADMIN — LEVOTHYROXINE SODIUM 75 MCG: 75 TABLET ORAL at 09:10

## 2024-04-07 RX ADMIN — IPRATROPIUM BROMIDE AND ALBUTEROL SULFATE 1 DOSE: 2.5; .5 SOLUTION RESPIRATORY (INHALATION) at 08:43

## 2024-04-07 RX ADMIN — AMIODARONE HYDROCHLORIDE 100 MG: 200 TABLET ORAL at 09:10

## 2024-04-07 RX ADMIN — IPRATROPIUM BROMIDE AND ALBUTEROL SULFATE 1 DOSE: 2.5; .5 SOLUTION RESPIRATORY (INHALATION) at 19:42

## 2024-04-07 RX ADMIN — PANTOPRAZOLE SODIUM 40 MG: 40 TABLET, DELAYED RELEASE ORAL at 09:10

## 2024-04-07 RX ADMIN — SACUBITRIL AND VALSARTAN 1 TABLET: 24; 26 TABLET, FILM COATED ORAL at 21:00

## 2024-04-07 RX ADMIN — SODIUM CHLORIDE, PRESERVATIVE FREE 10 ML: 5 INJECTION INTRAVENOUS at 21:00

## 2024-04-07 RX ADMIN — HEPARIN SODIUM 5000 UNITS: 10000 INJECTION INTRAVENOUS; SUBCUTANEOUS at 21:00

## 2024-04-07 RX ADMIN — IPRATROPIUM BROMIDE AND ALBUTEROL SULFATE 1 DOSE: 2.5; .5 SOLUTION RESPIRATORY (INHALATION) at 16:07

## 2024-04-07 RX ADMIN — IPRATROPIUM BROMIDE AND ALBUTEROL SULFATE 1 DOSE: 2.5; .5 SOLUTION RESPIRATORY (INHALATION) at 12:14

## 2024-04-07 RX ADMIN — SODIUM CHLORIDE, PRESERVATIVE FREE 10 ML: 5 INJECTION INTRAVENOUS at 09:11

## 2024-04-07 RX ADMIN — METHYLPREDNISOLONE SODIUM SUCCINATE 40 MG: 40 INJECTION INTRAMUSCULAR; INTRAVENOUS at 21:00

## 2024-04-07 RX ADMIN — FUROSEMIDE 40 MG: 10 INJECTION, SOLUTION INTRAMUSCULAR; INTRAVENOUS at 11:23

## 2024-04-07 RX ADMIN — FUROSEMIDE 40 MG: 10 INJECTION, SOLUTION INTRAMUSCULAR; INTRAVENOUS at 17:53

## 2024-04-07 RX ADMIN — SACUBITRIL AND VALSARTAN 1 TABLET: 24; 26 TABLET, FILM COATED ORAL at 09:49

## 2024-04-07 ASSESSMENT — PAIN SCALES - GENERAL: PAINLEVEL_OUTOF10: 0

## 2024-04-07 NOTE — CONSULTS
CARDIOLOGY CONSULTATION    Patient Name:  Rubia Rico    :  1945    Reason for Consultation:   History of atrial fibrillation and CHF    History of Present Illness:   Rubia Rico returns to [facility], following a history of increasing shortness of breath and desaturation over the past week or so prior to admission.  Approximately 9 months ago she underwent surgical intervention following a perforated bowel and underwent sigmoid resection with colostomy.  She does have a known history of an underlying cardiomyopathy with a left ventricular ejection fraction of 28±5% as noted in .  He has been on medical therapy since that time and follows with a cardiologist in [facility]..  She has a longstanding history of atrial fibrillation for which she has been on antiarrhythmic therapy..  At this time she denies experiencing any exertional chest discomfort.  Additionally a CT scan obtained for pulmonary embolism during the onset of admission suggest that of underlying hemochromatosis of the liver.    Past Medical History:   has a past medical history of Atrial fibrillation with RVR (HCC), Bilateral hearing loss, Bronchiectasis with acute exacerbation (HCC), COPD (chronic obstructive pulmonary disease) (HCC), Hepatic congestion, Interstitial pulmonary fibrosis (HCC), Moderate to severe mitral regurgitation, Pulmonary edema cardiac cause (HCC), Pulmonary hypertension (HCC), and Systolic and diastolic CHF, acute on chronic (HCC).    Surgical History:   has a past surgical history that includes fracture surgery right hip ().     Social History:   reports that she has never smoked. She has never used smokeless tobacco. She reports that she does not drink alcohol and does not use drugs.     Family History:  family history is remarkable for mother  heart disease Father  unknown cause but had coronary bypass..  Medications:  Prior to Admission medications   (HCC)  Active Problems:    Irregular cardiac rhythm    Pulmonary edema cardiac cause (HCC)    Bilateral hearing loss    Moderate to severe mitral regurgitation    COPD (chronic obstructive pulmonary disease) (HCC)    Pulmonary hypertension (HCC)    Severe protein-calorie malnutrition (HCC)  Resolved Problems:    * No resolved hospital problems. *      Plan:  Mrs. Rico has several discordant issues which in retrospect may be related to underlying hemochromatosis as suggested on her CT scan from admission.  This could involve her lungs as well as result and restrictive cardiomyopathy resulting in her present status.  Of note however is that her proBNP is 704 pg/mL which is upper limits of normal for her age group and she does have normal renal function presently.  Will repeat two-dimensional echocardiogram and determine if indeed she has significant pulm hypertension as well as determine the significance of her mitral regurgitation which was moderate as per 2D echo in 2023.  Once again of note is that her left ventricular systolic function was markedly decreased upon echocardiogram 2021 and she has been placed on heart failure medical regimen since that time.  Hopefully her anticipated echocardiogram will be of some help and perhaps she will require cardiac MRI if indeed she has cardiac hemochromatosis with HFE gene.  Once stabilized and discharged she will be referred back to her preferred cardiologist [place].    I have spent more than 55.critical care minutes face to face with Rubia Rico, reviewing notes and laboratory data with greater than 50% of this time instructing and counseling the patient and her daughter regarding my findings and recommendations and I have answered all questions as posed to me by Ms. Rico.  Thank you, Josiah Leal DO for allowing me to consult in the care of this patient.    Guillermo Calderón, , FACP, FACC, [facility]    NOTE:  This report was transcribed using voice

## 2024-04-07 NOTE — ED NOTES
ED to Inpatient Handoff Report    Notified floor that electronic handoff available and patient ready for transport to room 437.    Safety Risks: Risk of falls    Patient in Restraints: no    Constant Observer or Patient : no    Telemetry Monitoring Ordered :No           Order to transfer to unit without monitor:N/A    Last MEWS: 1 Time completed: 0109    Deterioration Index Score:   Predictive Model Details          25 (Normal)  Factor Value    Calculated 4/7/2024 01:08 52% Age 79 years old    Deterioration Index Model 15% Potassium 4.7 mmol/L     10% Respiratory rate 18     10% Systolic 149     5% Pulse oximetry 100 %     3% Blood pH abnormal (7.320)     2% Sodium 141 mmol/L     2% Pulse 62     1% WBC count 4.7 k/uL     0% Hematocrit 35.8 %     0% Temperature 98.2 °F (36.8 °C)        Vitals:    04/06/24 1732 04/06/24 1734 04/06/24 2046 04/07/24 0104   BP: (!) 189/90   (!) 149/68   Pulse: 58   62   Resp: 16   18   Temp: 98.1 °F (36.7 °C)   98.2 °F (36.8 °C)   TempSrc: Oral   Oral   SpO2: 99%  95% 100%   Weight:  37.6 kg (83 lb)           Opportunity for questions and clarification was provided.

## 2024-04-07 NOTE — CARE COORDINATION
Internal Medicine On-call Care Coordination Note    I was called by the ED physician because they recommended admission for this patient and we cover their PCP.  The history as I understand it after discussion with the ED physician is as follows:    Presents with shortness of breath  Hx COPD, baseline 1-2LNC at home  Requiring bipap in ED  Given duoneb and solumedrol    I placed admission orders.  Including:    Pulmonology consult  Jose Levine, or our coverage will see the patient tomorrow for H&P.    Electronically signed by LOLLY Ruiz CNP on 4/7/2024 at 1:16 AM

## 2024-04-07 NOTE — PROGRESS NOTES
Date: 4/7/2024    Time: 4:17 AM    Patient Placed On BIPAP/CPAP/ Non-Invasive Ventilation?  Yes    If no must comment.  Facial area red/color change? No           If YES are Blister/Lesion present?No   If yes must notify nursing staff  BIPAP/CPAP skin barrier?  Yes    Skin barrier type:mepilexlite        Comments: Pt placed on BiPAP 10/5 but only achieving tidal volumes around 250, Ipap increased to 12 tidal volumes now around 325 to 350. Machine plugged into red outlet and outside alarm plugged in.        Elina Hernadez RCP    04/07/24 0415   NIV Type   NIV Started/Stopped On   Mode Bilevel   Mask Type Full face mask   Mask Size Small   Assessment   Respirations 25   Comfort Level Good   Using Accessory Muscles No   Mask Compliance Good   Skin Assessment Clean, dry, & intact   Skin Protection for O2 Device Yes   Settings/Measurements   PIP Observed 12 cm H20   IPAP (S)  12 cmH20   CPAP/EPAP (S)  5 cmH2O   Vt (Measured) 325 mL   Rate Ordered 12   FiO2  40 %   Minute Volume (L/min) 8.4 Liters   Mask Leak (lpm) 27 lpm

## 2024-04-07 NOTE — PROGRESS NOTES
4 Eyes Skin Assessment     NAME:  Rubia Rico  YOB: 1945  MEDICAL RECORD NUMBER:  [id number]    The patient is being assessed for  Admission    I agree that at least one RN has performed a thorough Head to Toe Skin Assessment on the patient. ALL assessment sites listed below have been assessed.      Areas assessed by both nurses:    Head, Face, Ears, Shoulders, Back, Chest, Arms, Elbows, Hands, Sacrum. Buttock, Coccyx, Ischium, Legs. Feet and Heels, and Under Medical Devices         Does the Patient have a Wound? Yes wound(s) were present on assessment. LDA wound assessment was Initiated and completed by RN       Ned Prevention initiated by RN: Yes  Wound Care Orders initiated by RN: Yes    Pressure Injury (Stage 3,4, Unstageable, DTI, NWPT, and Complex wounds) if present, place Wound referral order by RN under : No    New Ostomies, if present place, Ostomy referral order under : Yes     Nurse 1 eSignature: Electronically signed by Alanna Lee RN on 4/7/24 at 4:22 AM EDT    **SHARE this note so that the co-signing nurse can place an eSignature**    Nurse 2 eSignature: Electronically signed by Elina Rubi RN on 4/7/24 at 4:23 AM EDT

## 2024-04-07 NOTE — PROGRESS NOTES
Date: 4/7/2024    Time: 5:20 AM    Patient Placed On BIPAP/CPAP/ Non-Invasive Ventilation?  Pt remains on    If no must comment.  Facial area red/color change? No           If YES are Blister/Lesion present?No   If yes must notify nursing staff  BIPAP/CPAP skin barrier?  Yes    Skin barrier type:mepilexlite       Comments: Pt now asleep and unable to maintain consistent tidal volumes, minute ventilation going as low as 1.4. Pt's Ipap again increase however not true change, pt switched to AVAPS at this time.         Elina Hernadez, P    04/07/24 0519   NIV Type   NIV Started/Stopped On   Mode (S)  AVAPS   Mask Type Full face mask   Mask Size Small   Assessment   Respirations 16   Comfort Level Good   Using Accessory Muscles No   Mask Compliance Good   Skin Assessment Clean, dry, & intact   Skin Protection for O2 Device Yes   Settings/Measurements   PIP Observed 22 cm H20   CPAP/EPAP (S)  5 cmH2O   IPAP Min 20 cmH2O   IPAP Max 28 cmH2O   Vt (Set, mL) (S)  400 mL   Vt (Measured) 414 mL   Rate Ordered (S)  16   FiO2  40 %   Minute Volume (L/min) 9.9 Liters   Mask Leak (lpm) 27 lpm

## 2024-04-07 NOTE — H&P
Internal Medicine History & Physical     Name: Rubia Rico  : 1945  Chief Complaint: Shortness of Breath (Normal wears 1-1.5 L NC as needed for copd, the past 2 weeks \" unable to keep up the oxygen level at home\" ) and Back Pain (Whole back pain, was seen here for constipation and treated for it, still have back pain and not improving, \" unable to take deep breath\" )  Primary Care Physician: Josiah Leal DO  Admission date: 2024  Date of service: 2024     History of Present Illness  Rubia is a 79 y.o. year old female.  Patient states that she has been having chest and back pain for the last couple weeks.  She states that she was seen in the ER about a week ago with similar and was diagnosed with constipation.  She has since had bowel movements but is continuing to have symptoms of chest back pain and shortness of breath.  She states she became increasingly short of breath even with minimal exertion.  She denies any leg swelling or leg pain, syncope.  She states that she has become more more hypoxic as she checks her oxygen levels at home with activity.  She states that she was on oxygen in December for her COPD and then has not worn it since then until the last week or 2.  She states she usually is able to get around fine.  She stated that she had several episodes of feeling like she was going to pass out when her oxygen level got down to 60%.  She states it would take about 20 minutes to recover.  She denies any fevers or chills.  Denies any nausea or vomiting.  States her appetite has been down.  No other skin rashes or skin lesions.  Patient admits to some cough with minimal sputum production.  She is felt to be a good historian.  She denies orthopnea, PND, syncope or lower extremity edema.      ED course:   Initial blood work and imaging studies performed. Admission recommended by ED physician. Case was discussed with ED provider. Meds in ED consisted of the following:  Medications   BMI 13.00 kg/m²     Physical Exam:   General: awake, alert, oriented to person, place, time, and purpose, appears stated age, cooperative, no acute distress, pleasant, appropriate mood, good historian  Eyes: conjunctivae/corneas clear, sclera non icteric, EOMI  Ears: no obvious scars, no lesions, no masses, hearing intact  Mouth: mucous membranes moist, no obvious oral sores  Head: normocephalic, atraumatic  Neck: no JVD, no adenopathy, no thyromegaly, neck is supple, trachea is midline  Back: ROM normal, no CVA tenderness.  Chest: no pain on palpation  Lungs: Crackles present at the bases posteriorly, without rhonchi, crackle, wheezing, or rale, no retractions or use of accessory muscles  Heart: regular rate and regular rhythm, no murmur, normal S1, S2  Abdomen: soft, non-tender; bowel sounds normal; no masses, no organomegaly  : Deferred   Extremities: no lower extremity edema, extremities atraumatic, no cyanosis, no clubbing, 2+ pedal pulses palpated  Skin: normal color, normal texture, normal turgor, no rashes, no lesions  Neurologic:5/5 muscle strength throughout, normal muscle tone throughout, face symmetric, hearing intact, tongue midline, speech appropriate without slurring, sensation to fine touch intact in upper and lower extremities    Labs-   Lab Results   Component Value Date    WBC 2.7 (L) 04/07/2024    HGB 10.2 (L) 04/07/2024    HCT 35.4 04/07/2024     04/07/2024     04/06/2024    K 4.7 04/06/2024    CL 97 (L) 04/06/2024    CREATININE 0.7 04/06/2024    BUN 14 04/06/2024    CO2 42 (HH) 04/06/2024    GLUCOSE 97 04/06/2024    ALT 11 04/06/2024    AST 18 04/06/2024    INR 1.0 12/13/2016    APTT 26.8 12/13/2016     Lab Results   Component Value Date    CKTOTAL 129 09/16/2023       Echocardiogram:  Conclusions      Summary   Left ventricle grossly normal in size.   Normal LV segmental wall motion.   Mild proximal septal thickening.   Early relaxation septum.   Mild left ventricular

## 2024-04-07 NOTE — CONSULTS
Pulmonary Consultation    Admit Date: 4/6/2024  Requesting Physician: Josiah Leal DO    Reason for consultation:  Shortness of breath, hypoxia  HPI:  The patient is a 79-year-old female known to our practice from several years ago.  In 2021, she stopped following with us.  The past, she was seen for pulmonary fibrosis and pulmonary nodules.  She refused any further CAT scans to follow-up her disease.    Shortly thereafter, the patient states that her home oximeter suggest that she no longer needed oxygen.  She stopped using it.  Short time ago, the patient noticed extreme exertional dyspnea.  Using her pulse oximeter again, she was noted to be profoundly hypoxic.  As shortness of breath began to limit her activities of daily living, she sought medical attention.    Seen and evaluated here, a CT scan of the chest no evidence is diffuse interstitial changes.  Of note is the patient being on low-dose amiodarone.  It appears she has been on it at least since 2021.    PMH:    Past Medical History:   Diagnosis Date    Atrial fibrillation with RVR (HCC) 06/07/2021    Bilateral hearing loss     Bronchiectasis with acute exacerbation (HCC)     COPD (chronic obstructive pulmonary disease) (HCC) 06/10/2021    Hepatic congestion 06/07/2021    Due to CHF    Interstitial pulmonary fibrosis (HCC)     Moderate to severe mitral regurgitation 06/08/2021    Pulmonary edema cardiac cause (HCC) 06/07/2021    Pulmonary hypertension (HCC) 06/11/2021    Systolic and diastolic CHF, acute on chronic (HCC) 06/08/2021     PSH:   Past Surgical History:   Procedure Laterality Date    FRACTURE SURGERY  11/24/215    right hip    LAPAROTOMY N/A 6/30/2023    EXPLORATORY LAPAROTOMY,SIGMOID COLECTOMY,  TAKEDOWN OF SPLENIC FLEXURE, COLOSTOMY, WOUND VAC PLACEMENT performed by Meir MART MD at [facility] OR    UPPER GASTROINTESTINAL ENDOSCOPY N/A 9/20/2023    EGD ESOPHAGOGASTRODUODENOSCOPY performed by Meir MART MD at

## 2024-04-08 ENCOUNTER — APPOINTMENT (OUTPATIENT)
Age: 79
DRG: 291 | End: 2024-04-08
Attending: INTERNAL MEDICINE
Payer: MEDICARE

## 2024-04-08 LAB
ALBUMIN SERPL-MCNC: 3.9 G/DL (ref 3.5–5.2)
ALP SERPL-CCNC: 152 U/L (ref 35–104)
ALT SERPL-CCNC: 11 U/L (ref 0–32)
ANION GAP SERPL CALCULATED.3IONS-SCNC: 6 MMOL/L (ref 7–16)
AST SERPL-CCNC: 17 U/L (ref 0–31)
B.E.: 17.3 MMOL/L (ref -3–3)
BASOPHILS # BLD: 0 K/UL (ref 0–0.2)
BASOPHILS NFR BLD: 0 % (ref 0–2)
BILIRUB SERPL-MCNC: 0.2 MG/DL (ref 0–1.2)
BUN SERPL-MCNC: 20 MG/DL (ref 6–23)
CALCIUM SERPL-MCNC: 9.1 MG/DL (ref 8.6–10.2)
CHLORIDE SERPL-SCNC: 91 MMOL/L (ref 98–107)
CO2 SERPL-SCNC: 44 MMOL/L (ref 22–29)
COHB: 0.7 % (ref 0–1.5)
CREAT SERPL-MCNC: 0.9 MG/DL (ref 0.5–1)
CRITICAL: ABNORMAL
DATE ANALYZED: ABNORMAL
DATE OF COLLECTION: ABNORMAL
ECHO AO ASC DIAM: 3.3 CM
ECHO AO ASCENDING AORTA INDEX: 2.54 CM/M2
ECHO AO ROOT DIAM: 3.3 CM
ECHO AO ROOT INDEX: 2.54 CM/M2
ECHO AO SINUS VALSALVA DIAM: 3.1 CM
ECHO AO SINUS VALSALVA INDEX: 2.38 CM/M2
ECHO AR MAX VEL PISA: 4.6 M/S
ECHO AV AREA PEAK VELOCITY: 2.7 CM2
ECHO AV AREA VTI: 2.8 CM2
ECHO AV AREA/BSA PEAK VELOCITY: 2.1 CM2/M2
ECHO AV AREA/BSA VTI: 2.2 CM2/M2
ECHO AV CUSP MM: 1.7 CM
ECHO AV MEAN GRADIENT: 6 MMHG
ECHO AV MEAN VELOCITY: 1.2 M/S
ECHO AV PEAK GRADIENT: 12 MMHG
ECHO AV PEAK VELOCITY: 1.7 M/S
ECHO AV REGURGITANT PHT: 533.2 MILLISECOND
ECHO AV VELOCITY RATIO: 0.88
ECHO AV VTI: 35.3 CM
ECHO BSA: 1.33 M2
ECHO EST RA PRESSURE: 3 MMHG
ECHO LA DIAMETER INDEX: 2.77 CM/M2
ECHO LA DIAMETER: 3.6 CM
ECHO LA TO AORTIC ROOT RATIO: 1.09
ECHO LA VOL A-L A2C: 103 ML (ref 22–52)
ECHO LA VOL A-L A4C: 75 ML (ref 22–52)
ECHO LA VOL MOD A2C: 96 ML (ref 22–52)
ECHO LA VOL MOD A4C: 73 ML (ref 22–52)
ECHO LA VOLUME AREA LENGTH: 93 ML
ECHO LA VOLUME INDEX A-L A2C: 79 ML/M2 (ref 16–34)
ECHO LA VOLUME INDEX A-L A4C: 58 ML/M2 (ref 16–34)
ECHO LA VOLUME INDEX AREA LENGTH: 72 ML/M2 (ref 16–34)
ECHO LA VOLUME INDEX MOD A2C: 74 ML/M2 (ref 16–34)
ECHO LA VOLUME INDEX MOD A4C: 56 ML/M2 (ref 16–34)
ECHO LV E' LATERAL VELOCITY: 5 CM/S
ECHO LV E' SEPTAL VELOCITY: 5 CM/S
ECHO LV EF PHYSICIAN: 58 %
ECHO LV FRACTIONAL SHORTENING: 28 % (ref 28–44)
ECHO LV GLOBAL LONGITUDINAL STRAIN (GLS): -17.6 %
ECHO LV GLOBAL LONGITUDINAL STRAIN (GLS): -18.1 %
ECHO LV GLOBAL LONGITUDINAL STRAIN (GLS): -21 %
ECHO LV GLOBAL LONGITUDINAL STRAIN (GLS): -27.3 %
ECHO LV INTERNAL DIMENSION DIASTOLE INDEX: 2.77 CM/M2
ECHO LV INTERNAL DIMENSION DIASTOLIC: 3.6 CM (ref 3.9–5.3)
ECHO LV INTERNAL DIMENSION SYSTOLIC INDEX: 2 CM/M2
ECHO LV INTERNAL DIMENSION SYSTOLIC: 2.6 CM
ECHO LV ISOVOLUMETRIC RELAXATION TIME (IVRT): 78.4 MS
ECHO LV IVSD: 1.6 CM (ref 0.6–0.9)
ECHO LV IVSS: 1.8 CM
ECHO LV MASS 2D: 212 G (ref 67–162)
ECHO LV MASS INDEX 2D: 163.1 G/M2 (ref 43–95)
ECHO LV POSTERIOR WALL DIASTOLIC: 1.5 CM (ref 0.6–0.9)
ECHO LV POSTERIOR WALL SYSTOLIC: 1.7 CM
ECHO LV RELATIVE WALL THICKNESS RATIO: 0.83
ECHO LVOT AREA: 3.1 CM2
ECHO LVOT AV VTI INDEX: 0.9
ECHO LVOT DIAM: 2 CM
ECHO LVOT MEAN GRADIENT: 4 MMHG
ECHO LVOT PEAK GRADIENT: 9 MMHG
ECHO LVOT PEAK VELOCITY: 1.5 M/S
ECHO LVOT STROKE VOLUME INDEX: 77.1 ML/M2
ECHO LVOT SV: 100.2 ML
ECHO LVOT VTI: 31.9 CM
ECHO MV "A" WAVE DURATION: 101.5 MSEC
ECHO MV A VELOCITY: 0.57 M/S
ECHO MV AREA PHT: 3.2 CM2
ECHO MV AREA VTI: 4.1 CM2
ECHO MV E DECELERATION TIME (DT): 183.1 MS
ECHO MV E VELOCITY: 0.82 M/S
ECHO MV E/A RATIO: 1.44
ECHO MV E/E' LATERAL: 16.4
ECHO MV E/E' RATIO (AVERAGED): 16.4
ECHO MV EROA PISA: 0.1 CM2
ECHO MV LVOT VTI INDEX: 0.76
ECHO MV MAX VELOCITY: 0.9 M/S
ECHO MV MEAN GRADIENT: 1 MMHG
ECHO MV MEAN VELOCITY: 0.5 M/S
ECHO MV PEAK GRADIENT: 4 MMHG
ECHO MV PRESSURE HALF TIME (PHT): 68.4 MS
ECHO MV REGURGITANT ALIASING (NYQUIST) VELOCITY: 34 CM/S
ECHO MV REGURGITANT RADIUS PISA: 0.6 CM
ECHO MV REGURGITANT VELOCITY PISA: 5.6 M/S
ECHO MV REGURGITANT VOLUME PISA: 27.22 ML
ECHO MV REGURGITANT VTIA: 198.3 CM
ECHO MV VTI: 24.4 CM
ECHO PV MAX VELOCITY: 1 M/S
ECHO PV MEAN GRADIENT: 2 MMHG
ECHO PV MEAN VELOCITY: 0.7 M/S
ECHO PV PEAK GRADIENT: 4 MMHG
ECHO PV VTI: 22.7 CM
ECHO PVEIN A DURATION: 87.7 MS
ECHO PVEIN A VELOCITY: 0.2 M/S
ECHO PVEIN PEAK D VELOCITY: 1 M/S
ECHO PVEIN PEAK S VELOCITY: 0.6 M/S
ECHO PVEIN S/D RATIO: 0.6
ECHO RIGHT VENTRICULAR SYSTOLIC PRESSURE (RVSP): 51 MMHG
ECHO RV INTERNAL DIMENSION: 2.4 CM
ECHO RV TAPSE: 1.9 CM (ref 1.7–?)
ECHO TV REGURGITANT MAX VELOCITY: 3.48 M/S
ECHO TV REGURGITANT PEAK GRADIENT: 49 MMHG
EOSINOPHIL # BLD: 0 K/UL (ref 0.05–0.5)
EOSINOPHILS RELATIVE PERCENT: 0 % (ref 0–6)
ERYTHROCYTE [DISTWIDTH] IN BLOOD BY AUTOMATED COUNT: 15 % (ref 11.5–15)
GFR SERPL CREATININE-BSD FRML MDRD: 67 ML/MIN/1.73M2
GLUCOSE SERPL-MCNC: 140 MG/DL (ref 74–99)
HCO3: 46.3 MMOL/L (ref 22–26)
HCT VFR BLD AUTO: 35.1 % (ref 34–48)
HGB BLD-MCNC: 10 G/DL (ref 11.5–15.5)
HHB: 2.4 % (ref 0–5)
LAB: ABNORMAL
LYMPHOCYTES NFR BLD: 0.19 K/UL (ref 1.5–4)
LYMPHOCYTES RELATIVE PERCENT: 4 % (ref 20–42)
Lab: 1120
MCH RBC QN AUTO: 28.6 PG (ref 26–35)
MCHC RBC AUTO-ENTMCNC: 28.5 G/DL (ref 32–34.5)
MCV RBC AUTO: 100.3 FL (ref 80–99.9)
METHB: 0.3 % (ref 0–1.5)
MODE: ABNORMAL
MONOCYTES NFR BLD: 0.05 K/UL (ref 0.1–0.95)
MONOCYTES NFR BLD: 1 % (ref 2–12)
NEUTROPHILS NFR BLD: 96 % (ref 43–80)
NEUTS SEG NFR BLD: 5.26 K/UL (ref 1.8–7.3)
O2 CONTENT: 15.3 ML/DL
O2 SATURATION: 97.6 % (ref 92–98.5)
O2HB: 96.6 % (ref 94–97)
OPERATOR ID: 5423
PATIENT TEMP: 37 C
PCO2: 84.9 MMHG (ref 35–45)
PH BLOOD GAS: 7.36 (ref 7.35–7.45)
PLATELET # BLD AUTO: 276 K/UL (ref 130–450)
PMV BLD AUTO: 9.1 FL (ref 7–12)
PO2: 95.8 MMHG (ref 75–100)
POTASSIUM SERPL-SCNC: 5.1 MMOL/L (ref 3.5–5)
PROT SERPL-MCNC: 7 G/DL (ref 6.4–8.3)
RBC # BLD AUTO: 3.5 M/UL (ref 3.5–5.5)
RBC # BLD: ABNORMAL 10*6/UL
SODIUM SERPL-SCNC: 141 MMOL/L (ref 132–146)
SOURCE, BLOOD GAS: ABNORMAL
THB: 11.2 G/DL (ref 11.5–16.5)
TIME ANALYZED: 1122
WBC OTHER # BLD: 5.5 K/UL (ref 4.5–11.5)

## 2024-04-08 PROCEDURE — 2700000000 HC OXYGEN THERAPY PER DAY

## 2024-04-08 PROCEDURE — 94660 CPAP INITIATION&MGMT: CPT

## 2024-04-08 PROCEDURE — 93306 TTE W/DOPPLER COMPLETE: CPT

## 2024-04-08 PROCEDURE — 80053 COMPREHEN METABOLIC PANEL: CPT

## 2024-04-08 PROCEDURE — 2060000000 HC ICU INTERMEDIATE R&B

## 2024-04-08 PROCEDURE — 6360000002 HC RX W HCPCS: Performed by: INTERNAL MEDICINE

## 2024-04-08 PROCEDURE — 6370000000 HC RX 637 (ALT 250 FOR IP): Performed by: NURSE PRACTITIONER

## 2024-04-08 PROCEDURE — 85025 COMPLETE CBC W/AUTO DIFF WBC: CPT

## 2024-04-08 PROCEDURE — 6360000002 HC RX W HCPCS: Performed by: HOSPITALIST

## 2024-04-08 PROCEDURE — 94640 AIRWAY INHALATION TREATMENT: CPT

## 2024-04-08 PROCEDURE — 2580000003 HC RX 258: Performed by: NURSE PRACTITIONER

## 2024-04-08 PROCEDURE — 82805 BLOOD GASES W/O2 SATURATION: CPT

## 2024-04-08 RX ORDER — FUROSEMIDE 10 MG/ML
40 INJECTION INTRAMUSCULAR; INTRAVENOUS DAILY
Status: DISCONTINUED | OUTPATIENT
Start: 2024-04-09 | End: 2024-04-11 | Stop reason: HOSPADM

## 2024-04-08 RX ADMIN — HEPARIN SODIUM 5000 UNITS: 10000 INJECTION INTRAVENOUS; SUBCUTANEOUS at 04:30

## 2024-04-08 RX ADMIN — METHYLPREDNISOLONE SODIUM SUCCINATE 40 MG: 40 INJECTION INTRAMUSCULAR; INTRAVENOUS at 20:09

## 2024-04-08 RX ADMIN — IPRATROPIUM BROMIDE AND ALBUTEROL SULFATE 1 DOSE: 2.5; .5 SOLUTION RESPIRATORY (INHALATION) at 08:08

## 2024-04-08 RX ADMIN — PANTOPRAZOLE SODIUM 40 MG: 40 TABLET, DELAYED RELEASE ORAL at 08:11

## 2024-04-08 RX ADMIN — SODIUM CHLORIDE, PRESERVATIVE FREE 10 ML: 5 INJECTION INTRAVENOUS at 08:10

## 2024-04-08 RX ADMIN — HEPARIN SODIUM 5000 UNITS: 10000 INJECTION INTRAVENOUS; SUBCUTANEOUS at 20:09

## 2024-04-08 RX ADMIN — METOPROLOL SUCCINATE 12.5 MG: 25 TABLET, EXTENDED RELEASE ORAL at 08:11

## 2024-04-08 RX ADMIN — FUROSEMIDE 40 MG: 10 INJECTION, SOLUTION INTRAMUSCULAR; INTRAVENOUS at 08:10

## 2024-04-08 RX ADMIN — AMIODARONE HYDROCHLORIDE 100 MG: 200 TABLET ORAL at 08:10

## 2024-04-08 RX ADMIN — LEVOTHYROXINE SODIUM 75 MCG: 75 TABLET ORAL at 08:11

## 2024-04-08 RX ADMIN — IPRATROPIUM BROMIDE AND ALBUTEROL SULFATE 1 DOSE: 2.5; .5 SOLUTION RESPIRATORY (INHALATION) at 15:59

## 2024-04-08 RX ADMIN — METHYLPREDNISOLONE SODIUM SUCCINATE 40 MG: 40 INJECTION INTRAMUSCULAR; INTRAVENOUS at 02:00

## 2024-04-08 RX ADMIN — SACUBITRIL AND VALSARTAN 1 TABLET: 24; 26 TABLET, FILM COATED ORAL at 20:09

## 2024-04-08 RX ADMIN — HEPARIN SODIUM 5000 UNITS: 10000 INJECTION INTRAVENOUS; SUBCUTANEOUS at 12:26

## 2024-04-08 RX ADMIN — IPRATROPIUM BROMIDE AND ALBUTEROL SULFATE 1 DOSE: 2.5; .5 SOLUTION RESPIRATORY (INHALATION) at 12:27

## 2024-04-08 RX ADMIN — METHYLPREDNISOLONE SODIUM SUCCINATE 40 MG: 40 INJECTION INTRAMUSCULAR; INTRAVENOUS at 08:10

## 2024-04-08 RX ADMIN — SODIUM CHLORIDE, PRESERVATIVE FREE 10 ML: 5 INJECTION INTRAVENOUS at 20:09

## 2024-04-08 RX ADMIN — METHYLPREDNISOLONE SODIUM SUCCINATE 40 MG: 40 INJECTION INTRAMUSCULAR; INTRAVENOUS at 12:26

## 2024-04-08 RX ADMIN — IPRATROPIUM BROMIDE AND ALBUTEROL SULFATE 1 DOSE: 2.5; .5 SOLUTION RESPIRATORY (INHALATION) at 20:03

## 2024-04-08 RX ADMIN — SACUBITRIL AND VALSARTAN 1 TABLET: 24; 26 TABLET, FILM COATED ORAL at 08:11

## 2024-04-08 RX ADMIN — POLYETHYLENE GLYCOL 3350 17 G: 17 POWDER, FOR SOLUTION ORAL at 08:11

## 2024-04-08 NOTE — PROGRESS NOTES
Spoke with patient this evening regarding use of bipap. At the time, she was walking about the room in no distress. She states that it was not helpful, uncomfortable and kept her agitated while she wore it. She does not wear anything of the sort at home and does not wish to continue this therapy. Unit remains at bedside.

## 2024-04-08 NOTE — ACP (ADVANCE CARE PLANNING)
Advance Care Planning   Healthcare Decision Maker:    Primary Decision Maker: Tram Rico - [facility] - [phone]    Click here to complete Healthcare Decision Makers including selection of the Healthcare Decision Maker Relationship (ie \"Primary\").  Today we documented Decision Maker(s) consistent with Legal Next of Kin hierarchy.

## 2024-04-08 NOTE — CARE COORDINATION
Social Work/Discharge Planning:   attempted to meet with patient, but she was in the restroom.  Will attempt to meet with patient at a later time.  Electronically signed by NICKY Nichols on 4/8/2024 at 2:11 PM

## 2024-04-08 NOTE — PROGRESS NOTES
Pulmonary Progress Note    Admit Date: 2024  Hospital day                               PCP: Josiah Leal DO    Chief Complaint (s):  Patient Active Problem List   Diagnosis    Intertrochanteric fracture (HCC)    Irregular cardiac rhythm    Atrial fibrillation with RVR (HCC)    Pulmonary edema cardiac cause (HCC)    Hepatic congestion    Bilateral hearing loss    Moderate to severe mitral regurgitation    Systolic and diastolic CHF, acute on chronic (HCC)    COPD (chronic obstructive pulmonary disease) (HCC)    Pulmonary hypertension (HCC)    Bronchiectasis with acute exacerbation (HCC)    Interstitial pulmonary fibrosis (HCC)    Small bowel obstruction (HCC)    Severe protein-calorie malnutrition (HCC)    Troponin level elevated    Acute hypercapnic respiratory failure (HCC)       Subjective:  Sitting on the edge of the bed, tolerated NIV last night but really does not see much of a difference.      Vitals:  VITALS:  BP (!) 158/89   Pulse 68   Temp 97.8 °F (36.6 °C) (Oral)   Resp 18   Ht 1.702 m (5' 7.01\")   Wt 31.9 kg (70 lb 6.4 oz)   SpO2 98%   BMI 11.02 kg/m²     24HR INTAKE/OUTPUT:    No intake or output data in the 24 hours ending 24 1533    24HR PULSE OXIMETRY RANGE:    SpO2  Av.1 %  Min: 95 %  Max: 100 %    Medications:  IV:   sodium chloride         Scheduled Meds:   [START ON 2024] furosemide  40 mg IntraVENous Daily    amiodarone  100 mg Oral Daily    levothyroxine  75 mcg Oral Daily RT    metoprolol succinate  12.5 mg Oral Daily    pantoprazole  40 mg Oral Daily    polyethylene glycol  17 g Oral Daily    ipratropium 0.5 mg-albuterol 2.5 mg  1 Dose Inhalation Q4H WA RT    sacubitril-valsartan  1 tablet Oral BID    sodium chloride flush  10 mL IntraVENous 2 times per day    heparin (porcine)  5,000 Units SubCUTAneous Q8H    methylPREDNISolone  40 mg IntraVENous Q6H       Diet:   ADULT DIET; Regular; Low Fat/Low Chol/High Fiber/MALISSA     EXAM:  General: No  distress. Alert.  Eyes: PERRL. No sclera icterus. No conjunctival injection.  ENT: No discharge. Pharynx clear.  Neck: Trachea midline. Normal thyroid.  Resp: No accessory muscle use.  Scattered rales.  No wheezing.  No rhonchi.   CV: Regular rate. Regular rhythm. No murmur or rub.   Abd: Non-tender. Non-distended. No masses. No organomegaly. Normal bowel sounds.   Skin: Warm and dry. No nodule on exposed extremities. No rash on exposed extremities.  Ext: No cyanosis, clubbing, edema  Lymph: No cervical LAD. No supraclavicular LAD.   M/S: No cyanosis. No joint deformity. No clubbing.   Neuro: Awake. Follows commands. Positive pupils/gag/corneals. Normal pain response.       Results:  CBC:   Recent Labs     04/06/24 1938 04/07/24  0847 04/08/24  0430   WBC 4.7 2.7* 5.5   HGB 10.1* 10.2* 10.0*   HCT 35.8 35.4 35.1   .9* 99.4 100.3*    278 276     BMP:   Recent Labs     04/06/24 1938 04/07/24  1210 04/08/24  0430    141 141   K 4.7 4.5 5.1*   CL 97* 96* 91*   CO2 42* 37* 44*   BUN 14 17 20   CREATININE 0.7 0.7 0.9     LIVER PROFILE:   Recent Labs     04/06/24 1938 04/07/24  1210 04/08/24  0430   AST 18 16 17   ALT 11 12 11   BILITOT 0.3 0.2 0.2   ALKPHOS 162* 165* 152*     PT/INR: No results for input(s): \"PROTIME\", \"INR\" in the last 72 hours.  APTT: No results for input(s): \"APTT\" in the last 72 hours.      Pathology:  N/A      Microbiology:  None    Recent ABG:   Recent Labs     04/08/24  1120   PH 7.355   PO2 95.8   PCO2 84.9*   HCO3 46.3*   BE 17.3*   O2SAT 97.6   METHB 0.3   O2HB 96.6   COHB 0.7   O2CON 15.3   HHB 2.4   THB 11.2*     FiO2 : 40 %       Recent Films:  CTA PULMONARY W CONTRAST   Final Result   1.  No indication for acute pulmonary emboli.      2.  There are findings to indicate congestive heart failure.      3.  Findings for hemochromatosis of the liver.      4.  Ectasia of the thoracic aorta although the diameter is no larger than 4   cm but appears to be out of proportion for

## 2024-04-08 NOTE — PROGRESS NOTES
larger than 4   cm but appears to be out of proportion for the patient body habitus but this   is stable back to the study of June 2021.  No dissection of the thoracic   aorta.      5.  Chronic scar seen in the upper lobes bilaterally as observed previously   with the fibrosis retraction and traction bronchiectasis particular seen in   the right upper lobe.  Stable right upper lobe nodule.  Consider follow-up   study time interval of 1 year from the present examination for monitoring the   fibrosis in the upper lobes which has minute confluent densities.      5.  No indication for superimposed acute pulmonary process.         XR CHEST PORTABLE   Final Result   1. No acute process.   2. Stable cardiomegaly.             Echocardiogram pending    Assessment   Active Hospital Problems    Diagnosis     Acute hypercapnic respiratory failure ([facility]) [J96.02]     Severe protein-calorie malnutrition ([facility]) [E43]     Pulmonary hypertension ([facility]) [I27.20]     COPD (chronic obstructive pulmonary disease) ([facility]) [J44.9]     Moderate to severe mitral regurgitation [I34.0]     Bilateral hearing loss [H91.93]     Pulmonary edema cardiac cause ([facility]) [I50.1]     Irregular cardiac rhythm [I49.9]          Plan  Acute on chronic respiratory failure with hypoxia  Likely related to COPD and CHF exacerbation  Continue current treatments as ordered  Continue supplemental oxygen and wean to maintain SpO2 >90% -- currently on 2L NC  Pulmonary consult appreciated    Acute COPD exacerbation  Continue nebulizer treatments  Continue IV steroids -- taper as per Pulmonary  Pulmonary consult appreciated    Acute on chronic Systolic congestive heart failure with pulmonary hypertension  Prior EF in 2021 significantly decreased but repeat in 2023 with improvement  Continue GDMT as ordered  Repeat echocardiogram ordered per Cardiology  Continue IV diuresis -- decrease to daily for now as fluid status appears improved and now with contraction  alkalosis    Metabolic alkalosis  CO2 44 on labs this morning  Likely component of contraction alkalosis from diuresis  We will decrease Lasix to daily and monitor    Atrial Fibrillation  Continue Amiodarone and Toprol XL  Not chronically on anticoagulation  Monitor telemetry    Severe protein calorie malnutrition  BMI 11  Encourage oral intake  Dietitian consulted for supplements    Hypothyroidism  Continue Synthroid    Follow labs   DVT prophylaxis  Please see orders for further management and care.  Discharge plan: likely return home when stable    The pertinent details of this case were discussed with Dr. Levine.    Electronically signed by LOLLY Bellamy CNP on 4/8/2024 at 10:53 AM    Addendum: I have personally participated in a face-to-face history and physical exam on the date of service with the patient. I have discussed the case with the nurse practitioner. I also participated in medical decision making on the date of service and I agree with all of the pertinent clinical information unless indicated in my editing of the note. I have reviewed and edited the note above based on my findings during my history, exam, and decision making on the same day of service.     My additional thoughts:   She was seen and examined in her room  She was in the bathroom getting washed up when I saw her  She still was requiring oxygen  Wean steroids  Pulmonology input is appreciated  Cut back diuresis  Likely discharge home in the near future    Electronically signed by Bismark Levine DO on 4/8/2024 at 12:42 PM    I can be reached through [employer].

## 2024-04-09 PROBLEM — E43 SEVERE PROTEIN-CALORIE MALNUTRITION (HCC): Chronic | Status: ACTIVE | Noted: 2024-04-09

## 2024-04-09 LAB
ALBUMIN SERPL-MCNC: 3.8 G/DL (ref 3.5–5.2)
ALP SERPL-CCNC: 138 U/L (ref 35–104)
ALT SERPL-CCNC: 16 U/L (ref 0–32)
ANION GAP SERPL CALCULATED.3IONS-SCNC: 3 MMOL/L (ref 7–16)
AST SERPL-CCNC: 17 U/L (ref 0–31)
B.E.: 16.7 MMOL/L (ref -3–3)
BASOPHILS # BLD: 0 K/UL (ref 0–0.2)
BASOPHILS NFR BLD: 0 % (ref 0–2)
BILIRUB SERPL-MCNC: 0.2 MG/DL (ref 0–1.2)
BUN SERPL-MCNC: 34 MG/DL (ref 6–23)
CALCIUM SERPL-MCNC: 9 MG/DL (ref 8.6–10.2)
CHLORIDE SERPL-SCNC: 93 MMOL/L (ref 98–107)
CO2 SERPL-SCNC: 46 MMOL/L (ref 22–29)
COHB: 1.1 % (ref 0–1.5)
CREAT SERPL-MCNC: 0.9 MG/DL (ref 0.5–1)
CRITICAL: ABNORMAL
DATE ANALYZED: ABNORMAL
DATE OF COLLECTION: ABNORMAL
EOSINOPHIL # BLD: 0 K/UL (ref 0.05–0.5)
EOSINOPHILS RELATIVE PERCENT: 0 % (ref 0–6)
ERYTHROCYTE [DISTWIDTH] IN BLOOD BY AUTOMATED COUNT: 14.9 % (ref 11.5–15)
GFR SERPL CREATININE-BSD FRML MDRD: 65 ML/MIN/1.73M2
GLUCOSE SERPL-MCNC: 133 MG/DL (ref 74–99)
HCO3: 45.3 MMOL/L (ref 22–26)
HCT VFR BLD AUTO: 32.1 % (ref 34–48)
HGB BLD-MCNC: 9.2 G/DL (ref 11.5–15.5)
HHB: 4 % (ref 0–5)
LAB: ABNORMAL
LYMPHOCYTES NFR BLD: 0.07 K/UL (ref 1.5–4)
LYMPHOCYTES RELATIVE PERCENT: 1 % (ref 20–42)
Lab: 1005
MCH RBC QN AUTO: 28.6 PG (ref 26–35)
MCHC RBC AUTO-ENTMCNC: 28.7 G/DL (ref 32–34.5)
MCV RBC AUTO: 99.7 FL (ref 80–99.9)
METHB: 0.3 % (ref 0–1.5)
MODE: ABNORMAL
MONOCYTES NFR BLD: 0.53 K/UL (ref 0.1–0.95)
MONOCYTES NFR BLD: 7 % (ref 2–12)
NEUTROPHILS NFR BLD: 92 % (ref 43–80)
NEUTS SEG NFR BLD: 7.01 K/UL (ref 1.8–7.3)
O2 CONTENT: 15.1 ML/DL
O2 SATURATION: 95.9 % (ref 92–98.5)
O2HB: 94.6 % (ref 94–97)
OPERATOR ID: 3186
PATIENT TEMP: 37 C
PCO2: 79.4 MMHG (ref 35–45)
PH BLOOD GAS: 7.37 (ref 7.35–7.45)
PLATELET # BLD AUTO: 247 K/UL (ref 130–450)
PMV BLD AUTO: 9.2 FL (ref 7–12)
PO2: 78.4 MMHG (ref 75–100)
POTASSIUM SERPL-SCNC: 4.1 MMOL/L (ref 3.5–5)
PROT SERPL-MCNC: 6.7 G/DL (ref 6.4–8.3)
RBC # BLD AUTO: 3.22 M/UL (ref 3.5–5.5)
RBC # BLD: ABNORMAL 10*6/UL
SODIUM SERPL-SCNC: 142 MMOL/L (ref 132–146)
SOURCE, BLOOD GAS: ABNORMAL
THB: 11.3 G/DL (ref 11.5–16.5)
TIME ANALYZED: 1019
WBC OTHER # BLD: 7.6 K/UL (ref 4.5–11.5)

## 2024-04-09 PROCEDURE — 6370000000 HC RX 637 (ALT 250 FOR IP): Performed by: NURSE PRACTITIONER

## 2024-04-09 PROCEDURE — 2580000003 HC RX 258: Performed by: NURSE PRACTITIONER

## 2024-04-09 PROCEDURE — 2700000000 HC OXYGEN THERAPY PER DAY

## 2024-04-09 PROCEDURE — 80053 COMPREHEN METABOLIC PANEL: CPT

## 2024-04-09 PROCEDURE — 6360000002 HC RX W HCPCS: Performed by: INTERNAL MEDICINE

## 2024-04-09 PROCEDURE — 2060000000 HC ICU INTERMEDIATE R&B

## 2024-04-09 PROCEDURE — 6370000000 HC RX 637 (ALT 250 FOR IP): Performed by: INTERNAL MEDICINE

## 2024-04-09 PROCEDURE — 94660 CPAP INITIATION&MGMT: CPT

## 2024-04-09 PROCEDURE — 6360000002 HC RX W HCPCS: Performed by: NURSE PRACTITIONER

## 2024-04-09 PROCEDURE — 94640 AIRWAY INHALATION TREATMENT: CPT

## 2024-04-09 PROCEDURE — 6360000002 HC RX W HCPCS

## 2024-04-09 PROCEDURE — 85025 COMPLETE CBC W/AUTO DIFF WBC: CPT

## 2024-04-09 PROCEDURE — 82805 BLOOD GASES W/O2 SATURATION: CPT

## 2024-04-09 RX ORDER — METHYLPREDNISOLONE SODIUM SUCCINATE 40 MG/ML
40 INJECTION, POWDER, LYOPHILIZED, FOR SOLUTION INTRAMUSCULAR; INTRAVENOUS EVERY 12 HOURS
Status: DISCONTINUED | OUTPATIENT
Start: 2024-04-09 | End: 2024-04-10

## 2024-04-09 RX ORDER — ARFORMOTEROL TARTRATE 15 UG/2ML
15 SOLUTION RESPIRATORY (INHALATION)
Status: DISCONTINUED | OUTPATIENT
Start: 2024-04-09 | End: 2024-04-11 | Stop reason: HOSPADM

## 2024-04-09 RX ORDER — BUDESONIDE 0.5 MG/2ML
0.5 INHALANT ORAL
Status: DISCONTINUED | OUTPATIENT
Start: 2024-04-09 | End: 2024-04-11 | Stop reason: HOSPADM

## 2024-04-09 RX ORDER — AMIODARONE HYDROCHLORIDE 200 MG/1
100 TABLET ORAL 2 TIMES DAILY
Status: DISCONTINUED | OUTPATIENT
Start: 2024-04-09 | End: 2024-04-11 | Stop reason: HOSPADM

## 2024-04-09 RX ADMIN — IPRATROPIUM BROMIDE AND ALBUTEROL SULFATE 1 DOSE: 2.5; .5 SOLUTION RESPIRATORY (INHALATION) at 13:19

## 2024-04-09 RX ADMIN — POLYETHYLENE GLYCOL 3350 17 G: 17 POWDER, FOR SOLUTION ORAL at 08:15

## 2024-04-09 RX ADMIN — SACUBITRIL AND VALSARTAN 1 TABLET: 24; 26 TABLET, FILM COATED ORAL at 19:49

## 2024-04-09 RX ADMIN — ARFORMOTEROL TARTRATE 15 MCG: 15 SOLUTION RESPIRATORY (INHALATION) at 20:50

## 2024-04-09 RX ADMIN — HEPARIN SODIUM 5000 UNITS: 10000 INJECTION INTRAVENOUS; SUBCUTANEOUS at 02:58

## 2024-04-09 RX ADMIN — HEPARIN SODIUM 5000 UNITS: 10000 INJECTION INTRAVENOUS; SUBCUTANEOUS at 19:49

## 2024-04-09 RX ADMIN — AMIODARONE HYDROCHLORIDE 100 MG: 200 TABLET ORAL at 08:15

## 2024-04-09 RX ADMIN — METHYLPREDNISOLONE SODIUM SUCCINATE 40 MG: 40 INJECTION INTRAMUSCULAR; INTRAVENOUS at 02:58

## 2024-04-09 RX ADMIN — FUROSEMIDE 40 MG: 10 INJECTION, SOLUTION INTRAMUSCULAR; INTRAVENOUS at 08:15

## 2024-04-09 RX ADMIN — LEVOTHYROXINE SODIUM 75 MCG: 75 TABLET ORAL at 08:15

## 2024-04-09 RX ADMIN — HEPARIN SODIUM 5000 UNITS: 10000 INJECTION INTRAVENOUS; SUBCUTANEOUS at 11:47

## 2024-04-09 RX ADMIN — BUDESONIDE 500 MCG: 0.5 SUSPENSION RESPIRATORY (INHALATION) at 20:50

## 2024-04-09 RX ADMIN — AMIODARONE HYDROCHLORIDE 100 MG: 200 TABLET ORAL at 19:49

## 2024-04-09 RX ADMIN — SACUBITRIL AND VALSARTAN 1 TABLET: 24; 26 TABLET, FILM COATED ORAL at 08:15

## 2024-04-09 RX ADMIN — IPRATROPIUM BROMIDE AND ALBUTEROL SULFATE 1 DOSE: 2.5; .5 SOLUTION RESPIRATORY (INHALATION) at 17:19

## 2024-04-09 RX ADMIN — IPRATROPIUM BROMIDE AND ALBUTEROL SULFATE 1 DOSE: 2.5; .5 SOLUTION RESPIRATORY (INHALATION) at 20:50

## 2024-04-09 RX ADMIN — METOPROLOL SUCCINATE 12.5 MG: 25 TABLET, EXTENDED RELEASE ORAL at 08:15

## 2024-04-09 RX ADMIN — SODIUM CHLORIDE, PRESERVATIVE FREE 10 ML: 5 INJECTION INTRAVENOUS at 19:53

## 2024-04-09 RX ADMIN — METHYLPREDNISOLONE SODIUM SUCCINATE 40 MG: 40 INJECTION INTRAMUSCULAR; INTRAVENOUS at 19:49

## 2024-04-09 RX ADMIN — SODIUM CHLORIDE, PRESERVATIVE FREE 10 ML: 5 INJECTION INTRAVENOUS at 08:16

## 2024-04-09 RX ADMIN — ACETAMINOPHEN 650 MG: 325 TABLET ORAL at 16:02

## 2024-04-09 RX ADMIN — PANTOPRAZOLE SODIUM 40 MG: 40 TABLET, DELAYED RELEASE ORAL at 08:15

## 2024-04-09 RX ADMIN — METHYLPREDNISOLONE SODIUM SUCCINATE 40 MG: 40 INJECTION INTRAMUSCULAR; INTRAVENOUS at 08:15

## 2024-04-09 ASSESSMENT — PAIN SCALES - GENERAL
PAINLEVEL_OUTOF10: 0
PAINLEVEL_OUTOF10: 0

## 2024-04-09 NOTE — PROGRESS NOTES
Pt refuses to wear non-skid socks on to ambulate to the bathroom. States, \"I don't do well with socks\". Encouraged and provided pt teaching regarding importance of wearing nonskid socks especially during ambulation.

## 2024-04-09 NOTE — PROGRESS NOTES
Internal Medicine Progress Note    Patient's name: Rubia Rico  : 1945  Chief complaints (on day of admission): Shortness of Breath (Normal wears 1-1.5 L NC as needed for copd, the past 2 weeks \" unable to keep up the oxygen level at home\" ) and Back Pain (Whole back pain, was seen here for constipation and treated for it, still have back pain and not improving, \" unable to take deep breath\" )  Admission date: 2024  Date of service: 2024   Room: [place] [address]  Primary care physician: Josiah Leal DO  Reason for visit: Follow-up for hypoxia    Subjective  Rubia was seen and examined.  She is lying in bed comfortable.  She is still on 2 L via nasal cannula.  Patient states that she uses 1 to 1/2 L nasal cannula as needed for COPD.  She states she feels better today than yesterday.  Denies any productive cough, fever or chills.    Review of Systems  There are no new complaints of chest pain, shortness of breath, abdominal pain, nausea, vomiting, diarrhea, constipation.    Hospital Medications  Current Facility-Administered Medications   Medication Dose Route Frequency Provider Last Rate Last Admin    perflutren lipid microspheres (DEFINITY) injection 1.5 mL  1.5 mL IntraVENous ONCE PRN Brian Mckeon MD        furosemide (LASIX) injection 40 mg  40 mg IntraVENous Daily Bela Rodriguez APRN - CNP        amiodarone (CORDARONE) tablet 100 mg  100 mg Oral Daily Olive Contreras APRN - CNP   100 mg at 24 0810    levothyroxine (SYNTHROID) tablet 75 mcg  75 mcg Oral Daily RT Olive Contreras APRN - CNP   75 mcg at 24 0811    metoprolol succinate (TOPROL XL) extended release tablet 12.5 mg  12.5 mg Oral Daily Olive Contreras APRN - CNP   12.5 mg at 24 0811    pantoprazole (PROTONIX) tablet 40 mg  40 mg Oral Daily CristinaivOlive tucker APRN - CNP   40 mg at 24 0811    polyethylene glycol (GLYCOLAX) packet 17 g  17 g Oral Daily Olive Contreras APRN - CNP   17 g at  NC  Pulmonary consult appreciated - recommended treating presumptively for COPD with supplemental oxygen and noninvasive ventilation     Acute COPD exacerbation with associated pulmonary fibrosis  Continue nebulizer treatments  Continue IV steroids -- taper as per Pulmonary  Pulmonary consult appreciated     Acute on chronic Systolic CHF with pulmonary hypertension  Prior EF in 2021 significantly decreased but repeat in 2023 with improvement  Continue GDMT as ordered  Repeat echocardiogram ordered per Cardiology -revealed EF of 55 to 60%, mildly increased wall thickness of left ventricle, mildly calcified cusp on aortic valve and leaflet on mitral valve.  Moderate mitral valve regurgitation, left atrial dilation, small pericardial effusion, mild right atrial dilation.  Continue IV diuresis -- decrease to daily for now as fluid status appears improved and now with contraction alkalosis     Atrial Fibrillation  Continue Amiodarone and Toprol XL  Not chronically on anticoagulation  Monitor telemetry     Severe protein calorie malnutrition  BMI 11  Encourage oral intake  Dietitian consulted for supplements     Hypothyroidism  Continue Synthroid     Follow labs   DVT prophylaxis  Please see orders for further management and care.  Discharge plan: likely return home when stable     The pertinent details of this case were discussed with Dr. Levine.    Electronically signed by Kristin Hernández DO on 4/9/2024 at 7:49 AM    Addendum: I have personally participated in a face-to-face history and physical exam on the date of service with the patient. I have discussed the case with the resident. I also participated in medical decision making with the resident on the date of service and I agree with all of the pertinent clinical information unless indicated in my editing of the note. I have reviewed and edited the note above based on my findings during my history, exam, and decision making on the same day of service.     My

## 2024-04-09 NOTE — CONSULTS
Comprehensive Nutrition Assessment    Type and Reason for Visit:  Initial, Consult, Patient Education    Nutrition Recommendations/Plan:   Continue current diet  Add Bryce wound healing ONS and Gelatein ONS BID to optimize nutrient intake & promote healing.  Will monitor     Nutrition Education    Educated on high protein/high calorie diet per consult. Discussed snacking in between meals and how to add calories and protein to her diet.   Learners: Patient  Readiness: Acceptance  Method: Explanation and Handout  Response: Verbalizes Understanding    Malnutrition Assessment:  Malnutrition Status:  Severe malnutrition (04/09/24 1018)    Context:  Chronic Illness     Findings of the 6 clinical characteristics of malnutrition:  Energy Intake:  Mild decrease in energy intake (Comment) (pt admits to being a slight eater)  Weight Loss:  Greater than 10% over 6 months (22.8% in ~6 months)     Body Fat Loss:  Severe body fat loss Orbital, Triceps, Buccal region   Muscle Mass Loss:  Severe muscle mass loss Temples (temporalis), Clavicles (pectoralis & deltoids), Hand (interosseous)  Fluid Accumulation:  No significant fluid accumulation     Strength:  Not Performed    Nutrition Assessment:     Pt adm w/ acute hypercapnic respiratory failure. PMHx: acute on chronic CHF, COPD w/ Pulmonary fibrosis, and severe protein-calorie malnutrition(6/23). Pt educated on high protein/high calorie diet per consult. Discussed snacking in between meals and how to add calories and protein to her diet. Pt states that milk and milky ONS do not sit well w/ her stomach. Pt agreed to try gelatein for protein and energy intake and Bryce for wound healing. Will provide both ONS BID. Continues to meet criteria for severe protein-calorie malnutrition.    Nutrition Related Findings:     A&O, frail, underweight for height, admits to slight eating,   Soft abd, active BS, colostomy, impaired hearing, +1 non-pitting edema, diuretic, glucose

## 2024-04-09 NOTE — PLAN OF CARE
Problem: Discharge Planning  Goal: Discharge to home or other facility with appropriate resources  4/9/2024 1242 by Stephy Banerjee RN  Outcome: Progressing     Problem: Pain  Goal: Verbalizes/displays adequate comfort level or baseline comfort level  4/9/2024 1242 by Stephy Banerjee RN  Outcome: Progressing     Problem: Safety - Adult  Goal: Free from fall injury  4/9/2024 1242 by Stephy aBnerjee RN  Outcome: Progressing     Problem: Chronic Conditions and Co-morbidities  Goal: Patient's chronic conditions and co-morbidity symptoms are monitored and maintained or improved  4/9/2024 1242 by Stephy Banerjee RN  Outcome: Progressing     Problem: Nutrition Deficit:  Goal: Optimize nutritional status  Outcome: Progressing

## 2024-04-09 NOTE — PROGRESS NOTES
PROGRESS NOTE       PATIENT PROBLEM LIST:  Patient Active Problem List   Diagnosis Code    Intertrochanteric fracture ([facility]) S72.143A    Irregular cardiac rhythm I49.9    Atrial fibrillation with RVR ([facility]) I48.91    Pulmonary edema cardiac cause ([facility]) I50.1    Hepatic congestion K76.1    Bilateral hearing loss H91.93    Moderate to severe mitral regurgitation I34.0    Systolic and diastolic CHF, acute on chronic ([facility]) I50.43    COPD (chronic obstructive pulmonary disease) ([facility]) J44.9    Pulmonary hypertension ([facility]) I27.20    Bronchiectasis with acute exacerbation ([facility]) J47.1    Interstitial pulmonary fibrosis ([facility]) J84.10    Small bowel obstruction ([facility]) K56.609    Severe protein-calorie malnutrition ([facility]) E43    Troponin level elevated R79.89    Acute hypercapnic respiratory failure ([facility]) J96.02       SUBJECTIVE:  Rubia Rico states ***    REVIEW OF SYSTEMS:  General ROS: negative for - fatigue, malaise,  weight gain or weight loss  Psychological ROS: negative for - anxiety , depression  Ophthalmic ROS: negative for - decreased vision or visual distortion.  ENT ROS: negative  Allergy and Immunology ROS: negative  Hematological and Lymphatic ROS: negative  Endocrine: no heat or cold intolerance and no polyphagia, polydipsia, or polyuria  Respiratory ROS: positive for - {:[id number]}  Cardiovascular ROS: positive for - {:[id number]}.  Gastrointestinal ROS: no abdominal pain, change in bowel habits, or black or bloody stools  Genito-Urinary ROS: no nocturia, dysuria, trouble voiding, frequency or hematuria  Musculoskeletal ROS: negative for- myalgias, arthralgias, or claudication  Neurological ROS: no TIA or stroke symptoms otherwise no significant change in symptoms or problems since yesterday as documented in previous progress notes.    SCHEDULED MEDICATIONS:   methylPREDNISolone  40 mg IntraVENous Q12H    budesonide  0.5 mg Nebulization BID RT    arformoterol tartrate  15 mcg Nebulization BID RT    furosemide  40 mg  malnutrition ([facility]) E43    Troponin level elevated R79.89    Acute hypercapnic respiratory failure ([facility]) J96.02       RECOMMENDATIONS:  ***    I have spent more than *** minutes face to face with Rubia Rico and reviewing notes and laboratory data, with greater than 50% of this time instructing and counseling the patient *** face to face regarding my findings and recommendations and I have answered all questions as posed to me by Ms. Rico.    Guillermo Calderón, DO FACP,FACC,[facility]      NOTE:  This report was transcribed using voice recognition software.  Every effort was made to ensure accuracy; however, inadvertent computerized transcription errors may be present

## 2024-04-09 NOTE — FLOWSHEET NOTE
Inpatient Wound Care (initial consult) 437    Admit Date: 4/6/2024  6:38 PM    Reason for consult:  coccyx, colostomy    Significant history:  per H&P    Chief Complaint: Shortness of Breath (Normal wears 1-1.5 L NC as needed for copd, the past 2 weeks \" unable to keep up the oxygen level at home\" ) and Back Pain (Whole back pain, was seen here for constipation and treated for it, still have back pain and not improving, \" unable to take deep breath\" )  Primary Care Physician: Josiah Leal DO  Admission date: 4/6/2024  Date of service: 4/7/2024      History of Present Illness  Rubia is a 79 y.o. year old female.  Patient states that she has been having chest and back pain for the last couple weeks.  She states that she was seen in the ER about a week ago with similar and was diagnosed with constipation.  She has since had bowel movements but is continuing to have symptoms of chest back pain and shortness of breath.  She states she became increasingly short of breath even with minimal exertion.  She denies any leg swelling or leg pain, syncope.  She states that she has become more more hypoxic as she checks her oxygen levels at home with activity.  She states that she was on oxygen in December for her COPD and then has not worn it since then until the last week or 2.  She states she usually is able to get around fine.  She stated that she had several episodes of feeling like she was going to pass out when her oxygen level got down to 60%.  She states it would take about 20 minutes to recover.  She denies any fevers or chills.  Denies any nausea or vomiting.  States her appetite has been down.  No other skin rashes or skin lesions.  Patient admits to some cough with minimal sputum production.  She is felt to be a good historian.  She denies orthopnea, PND, syncope or lower extremity edema    Past Medical History:   Diagnosis Date    Atrial fibrillation with RVR ([facility]) 06/07/2021    Bilateral hearing loss

## 2024-04-09 NOTE — PROGRESS NOTES
PROGRESS NOTE       PATIENT PROBLEM LIST:  Patient Active Problem List   Diagnosis Code    Intertrochanteric fracture ([facility]) S72.143A    Irregular cardiac rhythm I49.9    Atrial fibrillation with RVR ([facility]) I48.91    Pulmonary edema cardiac cause ([facility]) I50.1    Hepatic congestion K76.1    Bilateral hearing loss H91.93    Moderate to severe mitral regurgitation I34.0    Systolic and diastolic CHF, acute on chronic ([facility]) I50.43    COPD (chronic obstructive pulmonary disease) ([facility]) J44.9    Pulmonary hypertension ([facility]) I27.20    Bronchiectasis with acute exacerbation ([facility]) J47.1    Interstitial pulmonary fibrosis ([facility]) J84.10    Small bowel obstruction ([facility]) K56.609    Severe protein-calorie malnutrition ([facility]) E43    Troponin level elevated R79.89    Acute hypercapnic respiratory failure ([facility]) J96.02       SUBJECTIVE:  Rubia Rico is somewhat somnolent but does answer questions upon stimulation.  Her pCO2 is significantly elevated.    REVIEW OF SYSTEMS:  General ROS: negative for - fatigue, malaise,  weight gain or weight loss  Psychological ROS: negative for - anxiety , depression  Ophthalmic ROS: negative for - decreased vision or visual distortion.  ENT ROS: negative  Allergy and Immunology ROS: negative  Hematological and Lymphatic ROS: negative  Endocrine: no heat or cold intolerance and no polyphagia, polydipsia, or polyuria  Respiratory ROS: positive for -hypoventilation  Cardiovascular ROS: negative for - chest pain and edema.  Gastrointestinal ROS: no abdominal pain, change in bowel habits, or black or bloody stools  Genito-Urinary ROS: no nocturia, dysuria, trouble voiding, frequency or hematuria  Musculoskeletal ROS: negative for- myalgias, arthralgias, or claudication  Neurological ROS: no TIA or stroke symptoms otherwise no significant change in symptoms or problems since yesterday as documented in previous progress notes.    SCHEDULED MEDICATIONS:   methylPREDNISolone  40 mg IntraVENous Q12H     patient face to face regarding my findings and recommendations and I have answered all questions as posed to me by Ms. Rico.    Guillermo Calderón, DO FACP,FACC,[facility]      NOTE:  This report was transcribed using voice recognition software.  Every effort was made to ensure accuracy; however, inadvertent computerized transcription errors may be present

## 2024-04-09 NOTE — CARE COORDINATION
Social Work/Discharge Planning:  Met with patient and completed initial assessment. Explained Social Work landon and discussed transition of care/discharge planning.  She lives with her daughter in a second floor apartment.  PTA patient independent with no adaptive device.  She has a cane, wheeled walker and wears two liters oxygen.  Patient can not recall name of oxygen supplier and states to call her daughter.   Patient ostomy supplies are through [facility].  Patient plans to return home at discharge and denies any need for home health care at this time.  Called patient daughter Tram (ph: [phone]) and provided her with an update.  Tram confirmed patient oxygen is through [employer].  Will continue to follow and assist with discharge planning.  Electronically signed by NICKY Nichols on 4/9/2024 at 11:16 AM

## 2024-04-09 NOTE — PROGRESS NOTES
Pulmonary Progress Note    Admit Date: 2024  Hospital day                               PCP: Josiah Leal DO    Chief Complaint (s):  Patient Active Problem List   Diagnosis    Intertrochanteric fracture ([facility])    Irregular cardiac rhythm    Atrial fibrillation with RVR ([facility])    Pulmonary edema cardiac cause ([facility])    Hepatic congestion    Bilateral hearing loss    Moderate to severe mitral regurgitation    Systolic and diastolic CHF, acute on chronic ([facility])    COPD (chronic obstructive pulmonary disease) ([facility])    Pulmonary hypertension ([facility])    Bronchiectasis with acute exacerbation ([facility])    Interstitial pulmonary fibrosis ([facility])    Small bowel obstruction ([facility])    Severe protein-calorie malnutrition ([facility])    Troponin level elevated    Acute hypercapnic respiratory failure ([facility])       Subjective:  Patient seen sitting in bed on 2 L nasal cannula. She states she wears the NIV but feels is it \"strangling\" her and removes it in the middle of the night. Education provided.       Vitals:  VITALS:  /70   Pulse 70   Temp 97.8 °F (36.6 °C) (Oral)   Resp 18   Ht 1.702 m (5' 7.01\")   Wt 33.6 kg (74 lb 1.6 oz)   SpO2 97%   BMI 11.60 kg/m²     24HR INTAKE/OUTPUT:    No intake or output data in the 24 hours ending 24 1146    24HR PULSE OXIMETRY RANGE:    SpO2  Av.7 %  Min: 95 %  Max: 100 %    Medications:  IV:   sodium chloride         Scheduled Meds:   furosemide  40 mg IntraVENous Daily    amiodarone  100 mg Oral Daily    levothyroxine  75 mcg Oral Daily RT    metoprolol succinate  12.5 mg Oral Daily    pantoprazole  40 mg Oral Daily    polyethylene glycol  17 g Oral Daily    ipratropium 0.5 mg-albuterol 2.5 mg  1 Dose Inhalation Q4H WA RT    sacubitril-valsartan  1 tablet Oral BID    sodium chloride flush  10 mL IntraVENous 2 times per day    heparin (porcine)  5,000 Units SubCUTAneous Q8H    methylPREDNISolone  40 mg IntraVENous Q6H       Diet:   ADULT DIET; Regular; Low Fat/Low

## 2024-04-10 LAB
ALBUMIN SERPL-MCNC: 3.7 G/DL (ref 3.5–5.2)
ALP SERPL-CCNC: 125 U/L (ref 35–104)
ALT SERPL-CCNC: 18 U/L (ref 0–32)
ANION GAP SERPL CALCULATED.3IONS-SCNC: 1 MMOL/L (ref 7–16)
AST SERPL-CCNC: 21 U/L (ref 0–31)
BASOPHILS # BLD: 0 K/UL (ref 0–0.2)
BASOPHILS NFR BLD: 0 % (ref 0–2)
BILIRUB SERPL-MCNC: 0.2 MG/DL (ref 0–1.2)
BUN SERPL-MCNC: 36 MG/DL (ref 6–23)
CALCIUM SERPL-MCNC: 8.9 MG/DL (ref 8.6–10.2)
CHLORIDE SERPL-SCNC: 96 MMOL/L (ref 98–107)
CO2 SERPL-SCNC: 48 MMOL/L (ref 22–29)
CREAT SERPL-MCNC: 0.9 MG/DL (ref 0.5–1)
EOSINOPHIL # BLD: 0 K/UL (ref 0.05–0.5)
EOSINOPHILS RELATIVE PERCENT: 0 % (ref 0–6)
ERYTHROCYTE [DISTWIDTH] IN BLOOD BY AUTOMATED COUNT: 14.8 % (ref 11.5–15)
GFR SERPL CREATININE-BSD FRML MDRD: 68 ML/MIN/1.73M2
GLUCOSE SERPL-MCNC: 143 MG/DL (ref 74–99)
HCT VFR BLD AUTO: 32.6 % (ref 34–48)
HGB BLD-MCNC: 9.2 G/DL (ref 11.5–15.5)
LYMPHOCYTES NFR BLD: 0.13 K/UL (ref 1.5–4)
LYMPHOCYTES RELATIVE PERCENT: 2 % (ref 20–42)
MCH RBC QN AUTO: 28.6 PG (ref 26–35)
MCHC RBC AUTO-ENTMCNC: 28.2 G/DL (ref 32–34.5)
MCV RBC AUTO: 101.2 FL (ref 80–99.9)
MONOCYTES NFR BLD: 0.4 K/UL (ref 0.1–0.95)
MONOCYTES NFR BLD: 5 % (ref 2–12)
NEUTROPHILS NFR BLD: 93 % (ref 43–80)
NEUTS SEG NFR BLD: 7.16 K/UL (ref 1.8–7.3)
PLATELET # BLD AUTO: 227 K/UL (ref 130–450)
PMV BLD AUTO: 9.4 FL (ref 7–12)
POTASSIUM SERPL-SCNC: 4.2 MMOL/L (ref 3.5–5)
PROT SERPL-MCNC: 6.4 G/DL (ref 6.4–8.3)
RBC # BLD AUTO: 3.22 M/UL (ref 3.5–5.5)
RBC # BLD: ABNORMAL 10*6/UL
SODIUM SERPL-SCNC: 145 MMOL/L (ref 132–146)
WBC OTHER # BLD: 7.7 K/UL (ref 4.5–11.5)

## 2024-04-10 PROCEDURE — 2700000000 HC OXYGEN THERAPY PER DAY

## 2024-04-10 PROCEDURE — 6360000002 HC RX W HCPCS: Performed by: INTERNAL MEDICINE

## 2024-04-10 PROCEDURE — 6360000002 HC RX W HCPCS: Performed by: NURSE PRACTITIONER

## 2024-04-10 PROCEDURE — 94660 CPAP INITIATION&MGMT: CPT

## 2024-04-10 PROCEDURE — 6370000000 HC RX 637 (ALT 250 FOR IP): Performed by: NURSE PRACTITIONER

## 2024-04-10 PROCEDURE — 6360000002 HC RX W HCPCS

## 2024-04-10 PROCEDURE — 2580000003 HC RX 258: Performed by: NURSE PRACTITIONER

## 2024-04-10 PROCEDURE — 85025 COMPLETE CBC W/AUTO DIFF WBC: CPT

## 2024-04-10 PROCEDURE — 6370000000 HC RX 637 (ALT 250 FOR IP): Performed by: INTERNAL MEDICINE

## 2024-04-10 PROCEDURE — 94640 AIRWAY INHALATION TREATMENT: CPT

## 2024-04-10 PROCEDURE — 80053 COMPREHEN METABOLIC PANEL: CPT

## 2024-04-10 PROCEDURE — 2060000000 HC ICU INTERMEDIATE R&B

## 2024-04-10 RX ORDER — HEPARIN SODIUM 10000 [USP'U]/ML
5000 INJECTION, SOLUTION INTRAVENOUS; SUBCUTANEOUS EVERY 12 HOURS
Status: DISCONTINUED | OUTPATIENT
Start: 2024-04-10 | End: 2024-04-11 | Stop reason: SDUPTHER

## 2024-04-10 RX ORDER — PREDNISONE 10 MG/1
TABLET ORAL
Qty: 30 TABLET | Refills: 0 | Status: SHIPPED | OUTPATIENT
Start: 2024-04-10 | End: 2024-04-20

## 2024-04-10 RX ORDER — PREDNISONE 20 MG/1
40 TABLET ORAL DAILY
Status: DISCONTINUED | OUTPATIENT
Start: 2024-04-11 | End: 2024-04-11 | Stop reason: HOSPADM

## 2024-04-10 RX ORDER — AMIODARONE HYDROCHLORIDE 100 MG/1
100 TABLET ORAL 2 TIMES DAILY
Qty: 30 TABLET | Refills: 0 | Status: SHIPPED
Start: 2024-04-10

## 2024-04-10 RX ORDER — BUMETANIDE 0.5 MG/1
0.5 TABLET ORAL DAILY
Qty: 30 TABLET | Refills: 0 | Status: SHIPPED | OUTPATIENT
Start: 2024-04-10

## 2024-04-10 RX ADMIN — AMIODARONE HYDROCHLORIDE 100 MG: 200 TABLET ORAL at 21:00

## 2024-04-10 RX ADMIN — HEPARIN SODIUM 5000 UNITS: 10000 INJECTION INTRAVENOUS; SUBCUTANEOUS at 16:08

## 2024-04-10 RX ADMIN — ARFORMOTEROL TARTRATE 15 MCG: 15 SOLUTION RESPIRATORY (INHALATION) at 10:07

## 2024-04-10 RX ADMIN — SODIUM CHLORIDE, PRESERVATIVE FREE 10 ML: 5 INJECTION INTRAVENOUS at 08:33

## 2024-04-10 RX ADMIN — SODIUM CHLORIDE, PRESERVATIVE FREE 10 ML: 5 INJECTION INTRAVENOUS at 21:00

## 2024-04-10 RX ADMIN — LEVOTHYROXINE SODIUM 75 MCG: 75 TABLET ORAL at 06:59

## 2024-04-10 RX ADMIN — HEPARIN SODIUM 5000 UNITS: 10000 INJECTION INTRAVENOUS; SUBCUTANEOUS at 04:03

## 2024-04-10 RX ADMIN — POLYETHYLENE GLYCOL 3350 17 G: 17 POWDER, FOR SOLUTION ORAL at 08:32

## 2024-04-10 RX ADMIN — AMIODARONE HYDROCHLORIDE 100 MG: 200 TABLET ORAL at 08:32

## 2024-04-10 RX ADMIN — METHYLPREDNISOLONE SODIUM SUCCINATE 40 MG: 40 INJECTION INTRAMUSCULAR; INTRAVENOUS at 08:33

## 2024-04-10 RX ADMIN — FUROSEMIDE 40 MG: 10 INJECTION, SOLUTION INTRAMUSCULAR; INTRAVENOUS at 08:32

## 2024-04-10 RX ADMIN — PANTOPRAZOLE SODIUM 40 MG: 40 TABLET, DELAYED RELEASE ORAL at 08:33

## 2024-04-10 RX ADMIN — SACUBITRIL AND VALSARTAN 1 TABLET: 24; 26 TABLET, FILM COATED ORAL at 08:32

## 2024-04-10 RX ADMIN — METOPROLOL SUCCINATE 12.5 MG: 25 TABLET, EXTENDED RELEASE ORAL at 08:32

## 2024-04-10 RX ADMIN — IPRATROPIUM BROMIDE AND ALBUTEROL SULFATE 1 DOSE: 2.5; .5 SOLUTION RESPIRATORY (INHALATION) at 18:00

## 2024-04-10 RX ADMIN — BUDESONIDE 500 MCG: 0.5 SUSPENSION RESPIRATORY (INHALATION) at 10:08

## 2024-04-10 RX ADMIN — IPRATROPIUM BROMIDE AND ALBUTEROL SULFATE 1 DOSE: 2.5; .5 SOLUTION RESPIRATORY (INHALATION) at 10:08

## 2024-04-10 RX ADMIN — SACUBITRIL AND VALSARTAN 1 TABLET: 24; 26 TABLET, FILM COATED ORAL at 21:00

## 2024-04-10 RX ADMIN — ACETAMINOPHEN 650 MG: 325 TABLET ORAL at 21:00

## 2024-04-10 RX ADMIN — IPRATROPIUM BROMIDE AND ALBUTEROL SULFATE 1 DOSE: 2.5; .5 SOLUTION RESPIRATORY (INHALATION) at 13:57

## 2024-04-10 RX ADMIN — BUDESONIDE 500 MCG: 0.5 SUSPENSION RESPIRATORY (INHALATION) at 21:17

## 2024-04-10 RX ADMIN — IPRATROPIUM BROMIDE AND ALBUTEROL SULFATE 1 DOSE: 2.5; .5 SOLUTION RESPIRATORY (INHALATION) at 21:17

## 2024-04-10 RX ADMIN — ARFORMOTEROL TARTRATE 15 MCG: 15 SOLUTION RESPIRATORY (INHALATION) at 21:17

## 2024-04-10 ASSESSMENT — PAIN DESCRIPTION - FREQUENCY: FREQUENCY: INTERMITTENT

## 2024-04-10 ASSESSMENT — PAIN DESCRIPTION - ONSET: ONSET: ON-GOING

## 2024-04-10 ASSESSMENT — PAIN DESCRIPTION - LOCATION: LOCATION: HEAD

## 2024-04-10 ASSESSMENT — PAIN SCALES - GENERAL
PAINLEVEL_OUTOF10: 3
PAINLEVEL_OUTOF10: 0

## 2024-04-10 ASSESSMENT — PAIN DESCRIPTION - DESCRIPTORS: DESCRIPTORS: ACHING;DISCOMFORT;DULL

## 2024-04-10 ASSESSMENT — PAIN DESCRIPTION - PAIN TYPE: TYPE: ACUTE PAIN

## 2024-04-10 ASSESSMENT — PAIN - FUNCTIONAL ASSESSMENT: PAIN_FUNCTIONAL_ASSESSMENT: ACTIVITIES ARE NOT PREVENTED

## 2024-04-10 NOTE — PROGRESS NOTES
Internal Medicine Progress Note    Patient's name: Rubia Rico  : 1945  Chief complaints (on day of admission): Shortness of Breath (Normal wears 1-1.5 L NC as needed for copd, the past 2 weeks \" unable to keep up the oxygen level at home\" ) and Back Pain (Whole back pain, was seen here for constipation and treated for it, still have back pain and not improving, \" unable to take deep breath\" )  Admission date: 2024  Date of service: 4/10/2024   Room: [place] [id number]  Primary care physician: Josiah Leal DO  Reason for visit: Follow-up for hypoxia    Subjective  Rubia was seen and examined.  She is lying in bed comfortable.  She is still on 2 L via nasal cannula.  Patient states that she uses 1 to 1/2 L nasal cannula as needed for COPD.  She states she feels better today than yesterday.  Denies any productive cough, fever or chills.    Review of Systems  There are no new complaints of chest pain, shortness of breath, abdominal pain, nausea, vomiting, diarrhea, constipation.    Hospital Medications  Current Facility-Administered Medications   Medication Dose Route Frequency Provider Last Rate Last Admin    heparin (porcine) injection 5,000 Units  5,000 Units SubCUTAneous Q12H William Hamilton MD        methylPREDNISolone sodium succ (SOLU-MEDROL) injection 40 mg  40 mg IntraVENous Q12H Aminata Van APRN - NP   40 mg at 24    budesonide (PULMICORT) nebulizer suspension 500 mcg  0.5 mg Nebulization BID RT Aminata Van APRN - NP   500 mcg at 24    arformoterol tartrate (BROVANA) nebulizer solution 15 mcg  15 mcg Nebulization BID RT Aminata Van APRN - NP   15 mcg at 24    amiodarone (CORDARONE) tablet 100 mg  100 mg Oral BID Guillermo Calderón DO   100 mg at 24    perflutren lipid microspheres (DEFINITY) injection 1.5 mL  1.5 mL IntraVENous ONCE PRN Brian Mckeon MD        furosemide (LASIX) injection 40 mg  40 mg IntraVENous Daily Michael  congestive heart failure.      3.  Findings for hemochromatosis of the liver.      4.  Ectasia of the thoracic aorta although the diameter is no larger than 4   cm but appears to be out of proportion for the patient body habitus but this   is stable back to the study of June 2021.  No dissection of the thoracic   aorta.      5.  Chronic scar seen in the upper lobes bilaterally as observed previously   with the fibrosis retraction and traction bronchiectasis particular seen in   the right upper lobe.  Stable right upper lobe nodule.  Consider follow-up   study time interval of 1 year from the present examination for monitoring the   fibrosis in the upper lobes which has minute confluent densities.      5.  No indication for superimposed acute pulmonary process.         XR CHEST PORTABLE   Final Result   1. No acute process.   2. Stable cardiomegaly.             Echocardiogram 4/8/2024       Left Ventricle: Normal left ventricular systolic function with a visually estimated EF of 55 - 60%. Left ventricle size is normal. Mildly increased wall thickness. Mild basal septal thickening. Normal wall motion.  Normal gated strain    Aortic Valve: Trileaflet valve. Mildly calcified noncoronary cusp.    Mitral Valve: Mildly calcified nodular leaflet, at the posterior leaflet. Moderate regurgitation.    Left Atrium: Left atrium is moderately dilated. Left atrial volume index is severely increased (>48 mL/m2).    Right Atrium: Right atrium is mildly dilated.    Pericardium: Small (<1 cm) pericardial effusion present. No indication of cardiac tamponade.    Image quality is excellent.    Assessment   Active Hospital Problems    Diagnosis     Severe protein-calorie malnutrition (HCC) [E43]     Acute hypercapnic respiratory failure (HCC) [J96.02]     Pulmonary hypertension (HCC) [I27.20]     COPD (chronic obstructive pulmonary disease) (HCC) [J44.9]     Moderate to severe mitral regurgitation [I34.0]     Bilateral hearing loss

## 2024-04-10 NOTE — PROGRESS NOTES
DVT Prophylaxis Adjustment Policy (DVT Prophylaxis)     This patient is on DVT Prophylaxis medication that requires a dose adjustment      Date Body Weight IBW  Adjusted BW SCr  CrCl Dialysis status   4/10/2024 33.4 kg (73 lb 10.1 oz) Ideal body weight: 62.9 kg (138 lb 11.1 oz) Serum creatinine: 0.9 mg/dL 04/10/24 0130  Estimated creatinine clearance: 27 mL/min N/a       Pharmacy has dose-adjusted the DVT Prophylaxis regimen to match   the recommendations from the following table        Ordered Medication:Heparin 5000 Units TID    Order Changed/converted to: Heparin 5000 Units BID      These changes were made per protocol according to the [facility] Pharmacist   Review for Appropriate Use and Automatic Dose Adjustments of   Subcutaneous Anticoagulants Policy     *Please note this dose may need readjusted if patient's condition changes.    Please contact pharmacy with any questions regarding these changes.    renata fernando RPH  4/10/2024  6:43 AM

## 2024-04-10 NOTE — PROGRESS NOTES
Dr Calderón was notified that the patient is currently in afib, and has been afib 4/9, 4/10.      Dr Levine notified that Dr Calderón would like patient to stay the night and continue to monitor.      Also, discussed with the patients daughter that her mother has been off eliquis since June 2023, at which time she developed bleeding issues and had colon surgery with colostomy. .

## 2024-04-10 NOTE — PROGRESS NOTES
Pulmonary Progress Note    Admit Date: 2024  Hospital day                               PCP: Josiah Leal DO    Chief Complaint (s):  Patient Active Problem List   Diagnosis    Intertrochanteric fracture (HCC)    Irregular cardiac rhythm    Atrial fibrillation with RVR (HCC)    Pulmonary edema cardiac cause (HCC)    Hepatic congestion    Bilateral hearing loss    Moderate to severe mitral regurgitation    Systolic and diastolic CHF, acute on chronic (HCC)    COPD (chronic obstructive pulmonary disease) (HCC)    Pulmonary hypertension (HCC)    Bronchiectasis with acute exacerbation (HCC)    Interstitial pulmonary fibrosis (HCC)    Small bowel obstruction (HCC)    Severe protein-calorie malnutrition (HCC)    Troponin level elevated    Acute hypercapnic respiratory failure (HCC)       Subjective:  Patient seen sitting in bed on 2 L nasal cannula. She states she wears the NIV for short periods of time, if at all. Patient denies any breathing complaints today.       Vitals:  VITALS:  /83   Pulse (!) 106   Temp 97.4 °F (36.3 °C) (Oral)   Resp 18   Ht 1.702 m (5' 7.01\")   Wt 33.4 kg (73 lb 10.1 oz)   SpO2 99%   BMI 11.53 kg/m²     24HR INTAKE/OUTPUT:      Intake/Output Summary (Last 24 hours) at 4/10/2024 1129  Last data filed at 2024 1550  Gross per 24 hour   Intake --   Output 200 ml   Net -200 ml       24HR PULSE OXIMETRY RANGE:    SpO2  Av %  Min: 95 %  Max: 99 %    Medications:  IV:   sodium chloride         Scheduled Meds:   heparin (porcine)  5,000 Units SubCUTAneous Q12H    methylPREDNISolone  40 mg IntraVENous Q12H    budesonide  0.5 mg Nebulization BID RT    arformoterol tartrate  15 mcg Nebulization BID RT    amiodarone  100 mg Oral BID    furosemide  40 mg IntraVENous Daily    levothyroxine  75 mcg Oral Daily RT    metoprolol succinate  12.5 mg Oral Daily    pantoprazole  40 mg Oral Daily    polyethylene glycol  17 g Oral Daily    ipratropium 0.5 mg-albuterol 2.5 mg  1    HHB 4.0   THB 11.3*       FiO2 : 40 %       Recent Films:  CTA PULMONARY W CONTRAST   Final Result   1.  No indication for acute pulmonary emboli.      2.  There are findings to indicate congestive heart failure.      3.  Findings for hemochromatosis of the liver.      4.  Ectasia of the thoracic aorta although the diameter is no larger than 4   cm but appears to be out of proportion for the patient body habitus but this   is stable back to the study of June 2021.  No dissection of the thoracic   aorta.      5.  Chronic scar seen in the upper lobes bilaterally as observed previously   with the fibrosis retraction and traction bronchiectasis particular seen in   the right upper lobe.  Stable right upper lobe nodule.  Consider follow-up   study time interval of 1 year from the present examination for monitoring the   fibrosis in the upper lobes which has minute confluent densities.      5.  No indication for superimposed acute pulmonary process.         XR CHEST PORTABLE   Final Result   1. No acute process.   2. Stable cardiomegaly.                   Assessment:  COPD with pulmonary fibrosis  Acute on chronic hypercapnic respiratory failure  Pulm hypertension  Atrial fibrillation  Obstructive lung disease: Pulmonary functions in 2021 were very difficult to interpret likely secondary to modest patient effort.  Doubt but must consider amiodarone toxicity     Plan:  Wean steroids to prednisone taper   Continue aerosols.    Supplemental oxygen  Noninvasive ventilation: Review of the chart shows that the patient is never used this for more than 10 minutes at a time.       Time at the bedside, reviewing labs and radiographs, reviewing updated notes and consultations, discussing with staff and family was more than 35 minutes.    Please note that voice recognition technology was used in the preparation of this note and make therefore it may contain inadvertent transcription errors.  If the patient is a COVID 19 isolation

## 2024-04-10 NOTE — PLAN OF CARE
Problem: Discharge Planning  Goal: Discharge to home or other facility with appropriate resources  4/9/2024 2037 by Virginia Wilhelm RN  Outcome: Progressing  4/9/2024 1242 by Stephy Banerjee RN  Outcome: Progressing  4/9/2024 0939 by Jagruti Trinidad RN  Outcome: Progressing     Problem: Pain  Goal: Verbalizes/displays adequate comfort level or baseline comfort level  4/9/2024 2037 by Virginia Wilhelm RN  Outcome: Progressing  4/9/2024 1242 by Stephy Banerjee RN  Outcome: Progressing  4/9/2024 0939 by Jagruti Trinidad RN  Outcome: Progressing     Problem: Safety - Adult  Goal: Free from fall injury  4/9/2024 2037 by Virginia Wilhelm RN  Outcome: Progressing  4/9/2024 1242 by Stephy Banerjee RN  Outcome: Progressing  4/9/2024 0939 by Jagruti Trinidad RN  Outcome: Progressing     Problem: Chronic Conditions and Co-morbidities  Goal: Patient's chronic conditions and co-morbidity symptoms are monitored and maintained or improved  4/9/2024 2037 by Virginia Wilhelm RN  Outcome: Progressing  4/9/2024 1242 by Stephy Banerjee RN  Outcome: Progressing  4/9/2024 0939 by Jagruti Trinidad RN  Outcome: Progressing     Problem: Nutrition Deficit:  Goal: Optimize nutritional status  4/9/2024 2037 by Virginia Wilhelm RN  Outcome: Progressing  4/9/2024 1242 by Stephy Banerjee RN  Outcome: Progressing     Problem: Skin/Tissue Integrity  Goal: Absence of new skin breakdown  Description: 1.  Monitor for areas of redness and/or skin breakdown  2.  Assess vascular access sites hourly  3.  Every 4-6 hours minimum:  Change oxygen saturation probe site  4.  Every 4-6 hours:  If on nasal continuous positive airway pressure, respiratory therapy assess nares and determine need for appliance change or resting period.  Outcome: Progressing

## 2024-04-10 NOTE — CARE COORDINATION
Social Work/Discharge Planning:  Received notification patient may discharge today and need to confirm if she has a portable oxygen tank.  Called patient daughter Tram and informed her of possible discharge for today.  Tram confirmed her mother has portable oxygen tanks at home.  Informed Tram that nursing will call her if patient is to discharge.  Will continue to follow.  Electronically signed by NICKY Nichols on 4/10/2024 at 2:45 PM

## 2024-04-11 VITALS
RESPIRATION RATE: 16 BRPM | OXYGEN SATURATION: 99 % | WEIGHT: 93.4 LBS | BODY MASS INDEX: 14.66 KG/M2 | HEIGHT: 67 IN | HEART RATE: 81 BPM | TEMPERATURE: 98.1 F | DIASTOLIC BLOOD PRESSURE: 74 MMHG | SYSTOLIC BLOOD PRESSURE: 110 MMHG

## 2024-04-11 LAB
ALBUMIN SERPL-MCNC: 4 G/DL (ref 3.5–5.2)
ALP SERPL-CCNC: 128 U/L (ref 35–104)
ALT SERPL-CCNC: 18 U/L (ref 0–32)
ANION GAP SERPL CALCULATED.3IONS-SCNC: 5 MMOL/L (ref 7–16)
AST SERPL-CCNC: 17 U/L (ref 0–31)
BASOPHILS # BLD: 0 K/UL (ref 0–0.2)
BASOPHILS NFR BLD: 0 % (ref 0–2)
BILIRUB SERPL-MCNC: 0.3 MG/DL (ref 0–1.2)
BUN SERPL-MCNC: 52 MG/DL (ref 6–23)
CALCIUM SERPL-MCNC: 9.7 MG/DL (ref 8.6–10.2)
CHLORIDE SERPL-SCNC: 94 MMOL/L (ref 98–107)
CO2 SERPL-SCNC: 49 MMOL/L (ref 22–29)
CREAT SERPL-MCNC: 0.8 MG/DL (ref 0.5–1)
EKG ATRIAL RATE: 241 BPM
EKG Q-T INTERVAL: 420 MS
EKG QRS DURATION: 90 MS
EKG QTC CALCULATION (BAZETT): 426 MS
EKG R AXIS: -25 DEGREES
EKG T AXIS: 58 DEGREES
EKG VENTRICULAR RATE: 62 BPM
EOSINOPHIL # BLD: 0 K/UL (ref 0.05–0.5)
EOSINOPHILS RELATIVE PERCENT: 0 % (ref 0–6)
ERYTHROCYTE [DISTWIDTH] IN BLOOD BY AUTOMATED COUNT: 15 % (ref 11.5–15)
GFR SERPL CREATININE-BSD FRML MDRD: 72 ML/MIN/1.73M2
GLUCOSE SERPL-MCNC: 88 MG/DL (ref 74–99)
HCT VFR BLD AUTO: 37.3 % (ref 34–48)
HGB BLD-MCNC: 10.1 G/DL (ref 11.5–15.5)
LYMPHOCYTES NFR BLD: 0.17 K/UL (ref 1.5–4)
LYMPHOCYTES RELATIVE PERCENT: 3 % (ref 20–42)
MCH RBC QN AUTO: 28.7 PG (ref 26–35)
MCHC RBC AUTO-ENTMCNC: 27.1 G/DL (ref 32–34.5)
MCV RBC AUTO: 106 FL (ref 80–99.9)
MONOCYTES NFR BLD: 0.93 K/UL (ref 0.1–0.95)
MONOCYTES NFR BLD: 14 % (ref 2–12)
NEUTROPHILS NFR BLD: 84 % (ref 43–80)
NEUTS SEG NFR BLD: 5.59 K/UL (ref 1.8–7.3)
PLATELET # BLD AUTO: 226 K/UL (ref 130–450)
PMV BLD AUTO: 9.9 FL (ref 7–12)
POTASSIUM SERPL-SCNC: 4.4 MMOL/L (ref 3.5–5)
PROT SERPL-MCNC: 7.1 G/DL (ref 6.4–8.3)
RBC # BLD AUTO: 3.52 M/UL (ref 3.5–5.5)
RBC # BLD: ABNORMAL 10*6/UL
SODIUM SERPL-SCNC: 148 MMOL/L (ref 132–146)
WBC OTHER # BLD: 6.7 K/UL (ref 4.5–11.5)

## 2024-04-11 PROCEDURE — 2580000003 HC RX 258: Performed by: NURSE PRACTITIONER

## 2024-04-11 PROCEDURE — 6360000002 HC RX W HCPCS: Performed by: NURSE PRACTITIONER

## 2024-04-11 PROCEDURE — 80053 COMPREHEN METABOLIC PANEL: CPT

## 2024-04-11 PROCEDURE — 6370000000 HC RX 637 (ALT 250 FOR IP)

## 2024-04-11 PROCEDURE — 94660 CPAP INITIATION&MGMT: CPT

## 2024-04-11 PROCEDURE — 6370000000 HC RX 637 (ALT 250 FOR IP): Performed by: NURSE PRACTITIONER

## 2024-04-11 PROCEDURE — 2700000000 HC OXYGEN THERAPY PER DAY

## 2024-04-11 PROCEDURE — 94640 AIRWAY INHALATION TREATMENT: CPT

## 2024-04-11 PROCEDURE — 6370000000 HC RX 637 (ALT 250 FOR IP): Performed by: INTERNAL MEDICINE

## 2024-04-11 PROCEDURE — 6360000002 HC RX W HCPCS

## 2024-04-11 PROCEDURE — 85025 COMPLETE CBC W/AUTO DIFF WBC: CPT

## 2024-04-11 PROCEDURE — 6360000002 HC RX W HCPCS: Performed by: INTERNAL MEDICINE

## 2024-04-11 RX ORDER — IPRATROPIUM BROMIDE AND ALBUTEROL SULFATE 2.5; .5 MG/3ML; MG/3ML
3 SOLUTION RESPIRATORY (INHALATION)
Qty: 360 ML | Refills: 0 | Status: SHIPPED | OUTPATIENT
Start: 2024-04-11

## 2024-04-11 RX ORDER — HEPARIN SODIUM 5000 [USP'U]/ML
5000 INJECTION, SOLUTION INTRAVENOUS; SUBCUTANEOUS 2 TIMES DAILY
Status: DISCONTINUED | OUTPATIENT
Start: 2024-04-11 | End: 2024-04-11

## 2024-04-11 RX ADMIN — HEPARIN SODIUM 5000 UNITS: 10000 INJECTION INTRAVENOUS; SUBCUTANEOUS at 05:07

## 2024-04-11 RX ADMIN — ARFORMOTEROL TARTRATE 15 MCG: 15 SOLUTION RESPIRATORY (INHALATION) at 09:58

## 2024-04-11 RX ADMIN — SACUBITRIL AND VALSARTAN 1 TABLET: 24; 26 TABLET, FILM COATED ORAL at 19:58

## 2024-04-11 RX ADMIN — PANTOPRAZOLE SODIUM 40 MG: 40 TABLET, DELAYED RELEASE ORAL at 09:10

## 2024-04-11 RX ADMIN — APIXABAN 2.5 MG: 2.5 TABLET, FILM COATED ORAL at 10:53

## 2024-04-11 RX ADMIN — SACUBITRIL AND VALSARTAN 1 TABLET: 24; 26 TABLET, FILM COATED ORAL at 09:10

## 2024-04-11 RX ADMIN — AMIODARONE HYDROCHLORIDE 100 MG: 200 TABLET ORAL at 09:10

## 2024-04-11 RX ADMIN — POLYETHYLENE GLYCOL 3350 17 G: 17 POWDER, FOR SOLUTION ORAL at 09:10

## 2024-04-11 RX ADMIN — IPRATROPIUM BROMIDE AND ALBUTEROL SULFATE 1 DOSE: 2.5; .5 SOLUTION RESPIRATORY (INHALATION) at 13:04

## 2024-04-11 RX ADMIN — FUROSEMIDE 40 MG: 10 INJECTION, SOLUTION INTRAMUSCULAR; INTRAVENOUS at 09:10

## 2024-04-11 RX ADMIN — BUDESONIDE 500 MCG: 0.5 SUSPENSION RESPIRATORY (INHALATION) at 19:49

## 2024-04-11 RX ADMIN — IPRATROPIUM BROMIDE AND ALBUTEROL SULFATE 1 DOSE: 2.5; .5 SOLUTION RESPIRATORY (INHALATION) at 09:58

## 2024-04-11 RX ADMIN — ARFORMOTEROL TARTRATE 15 MCG: 15 SOLUTION RESPIRATORY (INHALATION) at 19:49

## 2024-04-11 RX ADMIN — PREDNISONE 40 MG: 20 TABLET ORAL at 09:10

## 2024-04-11 RX ADMIN — BUDESONIDE 500 MCG: 0.5 SUSPENSION RESPIRATORY (INHALATION) at 09:58

## 2024-04-11 RX ADMIN — IPRATROPIUM BROMIDE AND ALBUTEROL SULFATE 1 DOSE: 2.5; .5 SOLUTION RESPIRATORY (INHALATION) at 19:49

## 2024-04-11 RX ADMIN — IPRATROPIUM BROMIDE AND ALBUTEROL SULFATE 1 DOSE: 2.5; .5 SOLUTION RESPIRATORY (INHALATION) at 16:03

## 2024-04-11 RX ADMIN — METOPROLOL SUCCINATE 12.5 MG: 25 TABLET, EXTENDED RELEASE ORAL at 09:09

## 2024-04-11 RX ADMIN — LEVOTHYROXINE SODIUM 75 MCG: 75 TABLET ORAL at 09:10

## 2024-04-11 RX ADMIN — SODIUM CHLORIDE, PRESERVATIVE FREE 10 ML: 5 INJECTION INTRAVENOUS at 09:11

## 2024-04-11 RX ADMIN — AMIODARONE HYDROCHLORIDE 100 MG: 200 TABLET ORAL at 19:58

## 2024-04-11 RX ADMIN — ACETAMINOPHEN 650 MG: 325 TABLET ORAL at 10:58

## 2024-04-11 RX ADMIN — APIXABAN 2.5 MG: 2.5 TABLET, FILM COATED ORAL at 19:58

## 2024-04-11 ASSESSMENT — PAIN SCALES - GENERAL
PAINLEVEL_OUTOF10: 7
PAINLEVEL_OUTOF10: 0
PAINLEVEL_OUTOF10: 1

## 2024-04-11 ASSESSMENT — PAIN - FUNCTIONAL ASSESSMENT
PAIN_FUNCTIONAL_ASSESSMENT: ACTIVITIES ARE NOT PREVENTED
PAIN_FUNCTIONAL_ASSESSMENT: ACTIVITIES ARE NOT PREVENTED

## 2024-04-11 ASSESSMENT — PAIN DESCRIPTION - LOCATION
LOCATION: HEAD
LOCATION: HEAD

## 2024-04-11 ASSESSMENT — PAIN DESCRIPTION - DESCRIPTORS
DESCRIPTORS: ACHING
DESCRIPTORS: ACHING

## 2024-04-11 NOTE — PLAN OF CARE
Problem: Discharge Planning  Goal: Discharge to home or other facility with appropriate resources  4/11/2024 0238 by Anabella Young RN  Outcome: Progressing  4/10/2024 1550 by Alanna Marti RN  Outcome: Progressing     Problem: Pain  Goal: Verbalizes/displays adequate comfort level or baseline comfort level  4/11/2024 0238 by Anabella Young RN  Outcome: Progressing  4/10/2024 1550 by Alanna Marti RN  Outcome: Progressing     Problem: Safety - Adult  Goal: Free from fall injury  4/11/2024 0238 by Anabella Young RN  Outcome: Progressing  4/10/2024 1550 by Alanna Marti RN  Outcome: Progressing     Problem: Chronic Conditions and Co-morbidities  Goal: Patient's chronic conditions and co-morbidity symptoms are monitored and maintained or improved  4/11/2024 0238 by Anabella Young RN  Outcome: Progressing  4/10/2024 1550 by Alanna Marti RN  Outcome: Progressing     Problem: Nutrition Deficit:  Goal: Optimize nutritional status  Outcome: Progressing     Problem: Skin/Tissue Integrity  Goal: Absence of new skin breakdown  Description: 1.  Monitor for areas of redness and/or skin breakdown  2.  Assess vascular access sites hourly  3.  Every 4-6 hours minimum:  Change oxygen saturation probe site  4.  Every 4-6 hours:  If on nasal continuous positive airway pressure, respiratory therapy assess nares and determine need for appliance change or resting period.  Outcome: Progressing

## 2024-04-11 NOTE — PROGRESS NOTES
Call placed to answering service to inform Dr. Dougherty covering for Dr. Hamilton of critical CO2 of 49.

## 2024-04-11 NOTE — PROGRESS NOTES
PROGRESS NOTE       PATIENT PROBLEM LIST:  Patient Active Problem List   Diagnosis Code    Intertrochanteric fracture ([facility]) S72.143A    Irregular cardiac rhythm I49.9    Atrial fibrillation with RVR ([facility]) I48.91    Pulmonary edema cardiac cause ([facility]) I50.1    Hepatic congestion K76.1    Bilateral hearing loss H91.93    Moderate to severe mitral regurgitation I34.0    Systolic and diastolic CHF, acute on chronic (HCC) I50.43    COPD (chronic obstructive pulmonary disease) (HCC) J44.9    Pulmonary hypertension (HCC) I27.20    Bronchiectasis with acute exacerbation (HCC) J47.1    Interstitial pulmonary fibrosis (HCC) J84.10    Small bowel obstruction ([facility]) K56.609    Severe protein-calorie malnutrition ([facility]) E43    Troponin level elevated R79.89    Acute hypercapnic respiratory failure ([facility]) J96.02       SUBJECTIVE:  Rubia Rico is alert, sitting up in bed eating.  She denies any significant shortness of breath, lightheadedness, nor palpitations and wishes to be discharged.    REVIEW OF SYSTEMS:  General ROS: negative for - fatigue, malaise,  weight gain or weight loss  Psychological ROS: negative for - anxiety , depression  Ophthalmic ROS: negative for - decreased vision or visual distortion.  ENT ROS: negative  Allergy and Immunology ROS: negative  Hematological and Lymphatic ROS: negative  Endocrine: no heat or cold intolerance and no polyphagia, polydipsia, or polyuria  Respiratory ROS: positive for -hypoventilation  Cardiovascular ROS: negative for - chest pain and edema.  Gastrointestinal ROS: no abdominal pain, change in bowel habits, or black or bloody stools  Genito-Urinary ROS: no nocturia, dysuria, trouble voiding, frequency or hematuria  Musculoskeletal ROS: negative for- myalgias, arthralgias, or claudication  Neurological ROS: no TIA or stroke symptoms otherwise no significant change in symptoms or problems since yesterday as documented in previous progress notes.    SCHEDULED MEDICATIONS:

## 2024-04-11 NOTE — PROGRESS NOTES
CLINICAL PHARMACY NOTE: MEDS TO BEDS    Total # of Prescriptions Filled: 2   The following medications were delivered to the patient:  Prednisone 10 mg   Bumetanide 0.5 mg     Additional Documentation:

## 2024-04-11 NOTE — PLAN OF CARE
Problem: Discharge Planning  Goal: Discharge to home or other facility with appropriate resources  4/11/2024 0959 by Nichole Alonso, RN  Outcome: Progressing  4/11/2024 0830 by Nichole Alonso, RN  Outcome: Progressing  4/11/2024 0238 by Anabella Young, RN  Outcome: Progressing

## 2024-04-11 NOTE — CARE COORDINATION
CASE MANAGEMENT..... Discharge order on the chart. Also noted order for nebulizer. Called and spoke with daughter, Tram [phone] to discuss dc and needs. Tram confirmed that patient will return home with her. Is aware of need for nebulizer, but states she has neb at home. Ms Rico was started on Eliquis and was on it in the past. Tram is unsure if patient used free 30 day discount card. Card provided should she qualify.

## 2024-04-11 NOTE — PLAN OF CARE
Problem: Discharge Planning  Goal: Discharge to home or other facility with appropriate resources  4/11/2024 1241 by Nichole Alonso, RN  Outcome: Adequate for Discharge  4/11/2024 1241 by Nichole Alonso RN  Outcome: Adequate for Discharge  4/11/2024 0959 by Nichole Alonso, RN  Outcome: Progressing  4/11/2024 0830 by Nichole Alonso RN  Outcome: Progressing  4/11/2024 0238 by Anabella Young, RN  Outcome: Progressing

## 2024-04-11 NOTE — PROGRESS NOTES
Spoke with Tram, patients daughter. Explained to her what Kaitlynn said and ordered. Tram states \"ok, I am fine with that\". Awaiting call from Dr Hamilton.

## 2024-04-11 NOTE — PROGRESS NOTES
Dr Levine ordered Eliquis 5 mg daily, per Dr Levine, call daughter and inform her of order for eliquis before giving. Called Tram the daughter regarding eliquis vs heparin, she states she will leave it up to Dr Calderón. Spoke with Dr Calderón, States patient can go home on Eliquis 2.5 mg po BID. F/U with Dr Patrick if pt and daughter wants.

## 2024-04-11 NOTE — PROGRESS NOTES
CLINICAL PHARMACY NOTE: MEDS TO BEDS    Total # of Prescriptions Filled: 2   The following medications were delivered to the patient:  Eliquis 5 mg   Duoneb 0.5-2.5      Additional Documentation:

## 2024-04-11 NOTE — PLAN OF CARE
Problem: Discharge Planning  Goal: Discharge to home or other facility with appropriate resources  4/11/2024 0830 by Nichole Alonso, RN  Outcome: Progressing  4/11/2024 0238 by Anabella Young, RN  Outcome: Progressing

## 2024-04-11 NOTE — PLAN OF CARE
Problem: Discharge Planning  Goal: Discharge to home or other facility with appropriate resources  4/11/2024 1241 by Nichole Alonso, RN  Outcome: Adequate for Discharge  4/11/2024 0959 by Nichole Alonso, RN  Outcome: Progressing  4/11/2024 0830 by Nichole Alonso, RN  Outcome: Progressing  4/11/2024 0238 by Anabella Young, RN  Outcome: Progressing

## 2024-04-11 NOTE — DISCHARGE SUMMARY
bumetanide 0.5 MG tablet  Commonly known as: BUMEX  Take 1 tablet by mouth daily  What changed: when to take this            CONTINUE taking these medications      amiodarone 100 MG tablet  Commonly known as: PACERONE  Take 1 tablet by mouth 2 times daily     Entresto 24-26 MG per tablet  Generic drug: sacubitril-valsartan     levothyroxine 50 MCG tablet  Commonly known as: SYNTHROID     metoprolol succinate 25 MG extended release tablet  Commonly known as: TOPROL XL  Take 0.5 tablets by mouth daily     pantoprazole 40 MG tablet  Commonly known as: PROTONIX  Take 1 tablet by mouth daily            STOP taking these medications      polyethylene glycol 17 g packet  Commonly known as: MiraLax     white petrolatum Oint ointment               Where to Get Your Medications        These medications were sent to [address] -  [phone] - F [phone] [address] [id number]      Phone: [phone]   apixaban 5 MG Tabs tablet  bumetanide 0.5 MG tablet  ipratropium 0.5 mg-albuterol 2.5 mg 0.5-2.5 (3) MG/3ML Soln nebulizer solution  predniSONE 10 MG tablet       Information about where to get these medications is not yet available    Ask your nurse or doctor about these medications  amiodarone 100 MG tablet           INTERNAL MEDICINE INSTRUCTIONS:  Follow-up with primary care physician as directed in discharge paperwork.  Please review results of imaging studies with PCP.  Follow-up with consultants as directed by them.  If recurrence or worsening of symptoms go to the ED or call primary care physician.  Diet: ADULT DIET; Regular; Low Fat/Low Chol/High Fiber/MALISSA  ADULT ORAL NUTRITION SUPPLEMENT; Breakfast, Dinner; Wound Healing Oral Supplement  ADULT ORAL NUTRITION SUPPLEMENT; Lunch, Dinner; Fortified Gelatin Oral Supplement    FOLLOW-IP  Josiah Leal DO  [address] [id number] [phone]    Schedule an appointment as  soon as possible for a visit in 1 week(s)  for follow-up appointment from this hospital stay    William Hamilton MD  [address] [place] [address]  [address]  [place] [id number] [phone]    Call  for follow-up appointment from this hospital stay    Guillermo Calderón DO  [address] [id number] [phone]    Follow up        Preparing for this patient's discharge, including paperwork, orders, instructions, and meeting with patient did required 35 minutes.    Electronically signed by Bismark Levine DO on 4/12/2024 at 6:12 AM

## 2024-04-11 NOTE — PROGRESS NOTES
Pulmonary Progress Note    Admit Date: 2024  Hospital day                               PCP: Josiah Leal DO    Chief Complaint (s):  Patient Active Problem List   Diagnosis    Intertrochanteric fracture (HCC)    Irregular cardiac rhythm    Atrial fibrillation with RVR (HCC)    Pulmonary edema cardiac cause (HCC)    Hepatic congestion    Bilateral hearing loss    Moderate to severe mitral regurgitation    Systolic and diastolic CHF, acute on chronic (HCC)    COPD (chronic obstructive pulmonary disease) (HCC)    Pulmonary hypertension (HCC)    Bronchiectasis with acute exacerbation (HCC)    Interstitial pulmonary fibrosis (HCC)    Small bowel obstruction (HCC)    Severe protein-calorie malnutrition (HCC)    Troponin level elevated    Acute hypercapnic respiratory failure (HCC)       Subjective:  Patient seen sleeping in bed on 2L. She awakens to voice. Admits to not wearing NIV overnight.       Vitals:  VITALS:  /78   Pulse 82   Temp 98.2 °F (36.8 °C) (Oral)   Resp 16   Ht 1.702 m (5' 7.01\")   Wt 42.4 kg (93 lb 6.4 oz)   SpO2 97%   BMI 14.63 kg/m²     24HR INTAKE/OUTPUT:      Intake/Output Summary (Last 24 hours) at 2024 1104  Last data filed at 2024 0825  Gross per 24 hour   Intake 370 ml   Output --   Net 370 ml         24HR PULSE OXIMETRY RANGE:    SpO2  Av.8 %  Min: 93 %  Max: 100 %    Medications:  IV:   sodium chloride         Scheduled Meds:   apixaban  2.5 mg Oral BID    predniSONE  40 mg Oral Daily    budesonide  0.5 mg Nebulization BID RT    arformoterol tartrate  15 mcg Nebulization BID RT    amiodarone  100 mg Oral BID    furosemide  40 mg IntraVENous Daily    levothyroxine  75 mcg Oral Daily RT    metoprolol succinate  12.5 mg Oral Daily    pantoprazole  40 mg Oral Daily    polyethylene glycol  17 g Oral Daily    ipratropium 0.5 mg-albuterol 2.5 mg  1 Dose Inhalation Q4H WA RT    sacubitril-valsartan  1 tablet Oral BID    sodium chloride flush  10 mL  Result   1.  No indication for acute pulmonary emboli.      2.  There are findings to indicate congestive heart failure.      3.  Findings for hemochromatosis of the liver.      4.  Ectasia of the thoracic aorta although the diameter is no larger than 4   cm but appears to be out of proportion for the patient body habitus but this   is stable back to the study of June 2021.  No dissection of the thoracic   aorta.      5.  Chronic scar seen in the upper lobes bilaterally as observed previously   with the fibrosis retraction and traction bronchiectasis particular seen in   the right upper lobe.  Stable right upper lobe nodule.  Consider follow-up   study time interval of 1 year from the present examination for monitoring the   fibrosis in the upper lobes which has minute confluent densities.      5.  No indication for superimposed acute pulmonary process.         XR CHEST PORTABLE   Final Result   1. No acute process.   2. Stable cardiomegaly.                   Assessment:  COPD with pulmonary fibrosis  Acute on chronic hypercapnic respiratory failure  Pulm hypertension  Atrial fibrillation  Obstructive lung disease: Pulmonary functions in 2021 were very difficult to interpret likely secondary to modest patient effort.  Doubt but must consider amiodarone toxicity       Plan:  Prednisone taper  Continue aerosols.    Supplemental oxygen  Noninvasive ventilation: Review of the chart shows that the patient is never used this for more than 10 minutes at a time. This was discussed at length with patient regarding the need for compliance. We discussed that this can increase her risk for hospital readmission and shorten her lifespan leading to death.   Okay for discharge from pulmonary perspective. She needs to follow up in office in 2 weeks.        Time at the bedside, reviewing labs and radiographs, reviewing updated notes and consultations, discussing with staff and family was more than 35 minutes.    Please note that

## 2024-05-23 ENCOUNTER — APPOINTMENT (OUTPATIENT)
Dept: CT IMAGING | Age: 79
End: 2024-05-23
Payer: MEDICARE

## 2024-05-23 ENCOUNTER — APPOINTMENT (OUTPATIENT)
Dept: GENERAL RADIOLOGY | Age: 79
End: 2024-05-23
Payer: MEDICARE

## 2024-05-23 ENCOUNTER — HOSPITAL ENCOUNTER (OUTPATIENT)
Age: 79
Setting detail: OBSERVATION
End: 2024-05-23
Attending: EMERGENCY MEDICINE | Admitting: HOSPITALIST
Payer: MEDICARE

## 2024-05-23 VITALS
HEART RATE: 91 BPM | SYSTOLIC BLOOD PRESSURE: 121 MMHG | OXYGEN SATURATION: 90 % | BODY MASS INDEX: 14.63 KG/M2 | HEIGHT: 66 IN | DIASTOLIC BLOOD PRESSURE: 77 MMHG | TEMPERATURE: 97.2 F | WEIGHT: 91 LBS | RESPIRATION RATE: 23 BRPM

## 2024-05-23 DIAGNOSIS — Z71.89 DNR (DO NOT RESUSCITATE) DISCUSSION: ICD-10-CM

## 2024-05-23 DIAGNOSIS — G93.40 ACUTE ENCEPHALOPATHY: ICD-10-CM

## 2024-05-23 DIAGNOSIS — J96.02 ACUTE HYPERCAPNIC RESPIRATORY FAILURE (HCC): Primary | ICD-10-CM

## 2024-05-23 DIAGNOSIS — Z51.5 ENCOUNTER FOR ADMISSION TO HOSPICE CARE: ICD-10-CM

## 2024-05-23 PROBLEM — J96.92 HYPERCAPNIC RESPIRATORY FAILURE (HCC): Status: ACTIVE | Noted: 2024-05-23

## 2024-05-23 LAB
ALBUMIN SERPL-MCNC: 3.5 G/DL (ref 3.5–5.2)
ALP SERPL-CCNC: 236 U/L (ref 35–104)
ALT SERPL-CCNC: 20 U/L (ref 0–32)
ANION GAP SERPL CALCULATED.3IONS-SCNC: 5 MMOL/L (ref 7–16)
APAP SERPL-MCNC: <5 UG/ML (ref 10–30)
AST SERPL-CCNC: 18 U/L (ref 0–31)
B.E.: 15.6 MMOL/L (ref -3–3)
BASOPHILS # BLD: 0.05 K/UL (ref 0–0.2)
BASOPHILS NFR BLD: 1 % (ref 0–2)
BILIRUB SERPL-MCNC: 0.3 MG/DL (ref 0–1.2)
BUN SERPL-MCNC: 21 MG/DL (ref 6–23)
CALCIUM SERPL-MCNC: 9 MG/DL (ref 8.6–10.2)
CHLORIDE SERPL-SCNC: 97 MMOL/L (ref 98–107)
CO2 SERPL-SCNC: 48 MMOL/L (ref 22–29)
COHB: 0.6 % (ref 0–1.5)
CREAT SERPL-MCNC: 1.5 MG/DL (ref 0.5–1)
CRITICAL: ABNORMAL
DATE ANALYZED: ABNORMAL
DATE OF COLLECTION: ABNORMAL
EKG ATRIAL RATE: 312 BPM
EKG Q-T INTERVAL: 348 MS
EKG QRS DURATION: 110 MS
EKG QTC CALCULATION (BAZETT): 435 MS
EKG R AXIS: -168 DEGREES
EKG T AXIS: -36 DEGREES
EKG VENTRICULAR RATE: 94 BPM
EOSINOPHIL # BLD: 0 K/UL (ref 0.05–0.5)
EOSINOPHILS RELATIVE PERCENT: 0 % (ref 0–6)
ERYTHROCYTE [DISTWIDTH] IN BLOOD BY AUTOMATED COUNT: 18 % (ref 11.5–15)
ETHANOLAMINE SERPL-MCNC: <10 MG/DL (ref 0–0.08)
GFR, ESTIMATED: 36 ML/MIN/1.73M2
GLUCOSE BLD-MCNC: 127 MG/DL (ref 74–99)
GLUCOSE SERPL-MCNC: 117 MG/DL (ref 74–99)
HCO3: 48.1 MMOL/L (ref 22–26)
HCT VFR BLD AUTO: 34.6 % (ref 34–48)
HGB BLD-MCNC: 9 G/DL (ref 11.5–15.5)
HHB: 1.2 % (ref 0–5)
LAB: ABNORMAL
LACTATE BLDV-SCNC: 0.8 MMOL/L (ref 0.5–2.2)
LYMPHOCYTES NFR BLD: 0.16 K/UL (ref 1.5–4)
LYMPHOCYTES RELATIVE PERCENT: 3 % (ref 20–42)
Lab: 1550
MCH RBC QN AUTO: 29.8 PG (ref 26–35)
MCHC RBC AUTO-ENTMCNC: 26 G/DL (ref 32–34.5)
MCV RBC AUTO: 114.6 FL (ref 80–99.9)
METHB: 0.3 % (ref 0–1.5)
MODE: ABNORMAL
MONOCYTES NFR BLD: 0.44 K/UL (ref 0.1–0.95)
MONOCYTES NFR BLD: 7 % (ref 2–12)
MYELOCYTES ABSOLUTE COUNT: 0.05 K/UL
MYELOCYTES: 1 %
NEUTROPHILS NFR BLD: 89 % (ref 43–80)
NEUTS SEG NFR BLD: 5.59 K/UL (ref 1.8–7.3)
NUCLEATED RED BLOOD CELLS: 1 PER 100 WBC
O2 CONTENT: 13.1 ML/DL
O2 SATURATION: 98.8 % (ref 92–98.5)
O2HB: 97.9 % (ref 94–97)
OPERATOR ID: 316
PATIENT TEMP: 37 C
PCO2: 143.7 MMHG (ref 35–45)
PH BLOOD GAS: 7.14 (ref 7.35–7.45)
PLATELET # BLD AUTO: 328 K/UL (ref 130–450)
PMV BLD AUTO: 9.7 FL (ref 7–12)
PO2: 140.5 MMHG (ref 75–100)
POTASSIUM SERPL-SCNC: 3.7 MMOL/L (ref 3.5–5)
PROT SERPL-MCNC: 6.6 G/DL (ref 6.4–8.3)
RBC # BLD AUTO: 3.02 M/UL (ref 3.5–5.5)
RBC # BLD: ABNORMAL 10*6/UL
SALICYLATES SERPL-MCNC: <0.3 MG/DL (ref 0–30)
SODIUM SERPL-SCNC: 150 MMOL/L (ref 132–146)
SOURCE, BLOOD GAS: ABNORMAL
THB: 9.3 G/DL (ref 11.5–16.5)
TIME ANALYZED: 1552
TOXIC TRICYCLIC SC,BLOOD: NEGATIVE
WBC OTHER # BLD: 6.3 K/UL (ref 4.5–11.5)

## 2024-05-23 PROCEDURE — G0480 DRUG TEST DEF 1-7 CLASSES: HCPCS

## 2024-05-23 PROCEDURE — G0378 HOSPITAL OBSERVATION PER HR: HCPCS

## 2024-05-23 PROCEDURE — 93010 ELECTROCARDIOGRAM REPORT: CPT | Performed by: INTERNAL MEDICINE

## 2024-05-23 PROCEDURE — 85025 COMPLETE CBC W/AUTO DIFF WBC: CPT

## 2024-05-23 PROCEDURE — 99285 EMERGENCY DEPT VISIT HI MDM: CPT

## 2024-05-23 PROCEDURE — 80143 DRUG ASSAY ACETAMINOPHEN: CPT

## 2024-05-23 PROCEDURE — 80053 COMPREHEN METABOLIC PANEL: CPT

## 2024-05-23 PROCEDURE — 93005 ELECTROCARDIOGRAM TRACING: CPT | Performed by: EMERGENCY MEDICINE

## 2024-05-23 PROCEDURE — 80179 DRUG ASSAY SALICYLATE: CPT

## 2024-05-23 PROCEDURE — 80307 DRUG TEST PRSMV CHEM ANLYZR: CPT

## 2024-05-23 PROCEDURE — 71045 X-RAY EXAM CHEST 1 VIEW: CPT

## 2024-05-23 PROCEDURE — 83605 ASSAY OF LACTIC ACID: CPT

## 2024-05-23 PROCEDURE — 82805 BLOOD GASES W/O2 SATURATION: CPT

## 2024-05-23 PROCEDURE — 82962 GLUCOSE BLOOD TEST: CPT

## 2024-05-23 RX ORDER — 0.9 % SODIUM CHLORIDE 0.9 %
1000 INTRAVENOUS SOLUTION INTRAVENOUS ONCE
Status: DISCONTINUED | OUTPATIENT
Start: 2024-05-23 | End: 2024-05-24 | Stop reason: HOSPADM

## 2024-05-23 NOTE — PLAN OF CARE
Problem: Safety - Adult  Goal: Free from fall injury  Outcome: Progressing     Problem: Skin/Tissue Integrity  Goal: Absence of new skin breakdown  Description: 1.  Monitor for areas of redness and/or skin breakdown  2.  Assess vascular access sites hourly  3.  Every 4-6 hours minimum:  Change oxygen saturation probe site  4.  Every 4-6 hours:  If on nasal continuous positive airway pressure, respiratory therapy assess nares and determine need for appliance change or resting period.  Outcome: Progressing

## 2024-05-23 NOTE — PROGRESS NOTES
HOSPICE [facility]    Consult received. Chart reviewed. Visited patient at bedside. She is obtunded. Daughter Tram at bedside.     The hospice benefit and philosophy were explained including that hospice is end of life care in which, per Medicare, a patient has a terminal diagnosis that life expectancy would be 6 months or less. Explained that once in hospice care, all aggressive treatments would be stopped and allow nature to takes its course with focus on comfort care for the patient.  Explained hospice services at home, at Replaced by [facility] with room/board private pay unless patient has Medicaid and the Hospice House for short-term symptom management and respite.    Rubia does not meet Hospice House criteria at this time. Tram says she is unable to care for her mom at home, she will need to be admitted to a ECF. Discussed with physician and he will admit patient and [facility] will follow up tomorrow.     Thank you for the consult. Bedside nurse updated.   Electronically signed by Sarah Bertrand RN on 5/23/2024 at 5:11 PM  [phone]; [phone]

## 2024-05-23 NOTE — CARE COORDINATION
Social Work/Transition of Care:    Pt and family requested [facility], SHANE placed call to [facility] Intake, received call back from [facility] liaisons have gone home for the day pt can be evaluated in the AM. SHANE updated pts  Dtr who reported to this worker RUTH evaluated pt and pt does not meet criteria for Hospice House.  SHANE discussed ECF placement due to dtr stating pt lives with her and her home is not suitable for pt to return due to the stairs.  SHANE provided pts dtr with ECF Choice list, pt to be medically admitted Assigned SHANE/LUANA will follow up.  SHANE notified [facility] MIRANDA on call pt was seen.    Electronically signed by NICKY barrera on 5/23/2024 at 5:44 PM

## 2024-05-23 NOTE — ED PROVIDER NOTES
79-year-old female presents to the emergency department with altered mental status.  Family called emergency services today as she did not seem to really be waking up this morning they states she will open her eyes and she says she talks but really has not done much other than that family is unaware of any fevers any falls any chest pain or shortness of breath abdominal pain or other complaints.  They report no cough patient does have a history of A-fib is currently not on anticoagulation.  Patient chronically wears 2 to 3 L of oxygen    The history is provided by the patient.   Altered Mental Status  Presenting symptoms: lethargy and partial responsiveness    Severity:  Mild  Most recent episode:  Today  Episode history:  Single  Timing:  Intermittent  Progression:  Waxing and waning  Chronicity:  New       Review of Systems   Unable to perform ROS: Mental status change        Physical Exam  Constitutional:       General: She is sleeping.      Appearance: Normal appearance.      Comments: Patient very lethargic on evaluation she does open her eyes she is breathing she has a pulse.  She just seems very lethargic and minimally responsive   HENT:      Head: Normocephalic and atraumatic.      Nose: Nose normal.      Mouth/Throat:      Mouth: Mucous membranes are moist.   Eyes:      Extraocular Movements: Extraocular movements intact.      Pupils: Pupils are equal, round, and reactive to light.   Cardiovascular:      Rate and Rhythm: Normal rate. Rhythm irregular.      Pulses: Normal pulses.      Heart sounds: Normal heart sounds.   Pulmonary:      Effort: Pulmonary effort is normal.      Breath sounds: Normal breath sounds.   Abdominal:      General: Abdomen is flat. Bowel sounds are normal. There is no distension.      Palpations: Abdomen is soft.      Tenderness: There is no abdominal tenderness. There is no guarding.   Musculoskeletal:         General: Normal range of motion.   Skin:     General: Skin is warm.

## 2024-05-23 NOTE — ED NOTES
ED to Inpatient Handoff Report    Notified Jessica that electronic handoff available and patient ready for transport to room 538.    Safety Risks: Risk of falls    Patient in Restraints: no    Constant Observer or Patient : no    Telemetry Monitoring Ordered :No      Cardiac Rhythm: Atrial fib    Order to transfer to unit without monitor:N/A    Last MEWS: 2 Time completed: 1736    Deterioration Index Score:   Predictive Model Details          67 (Warning)  Factor Value    Calculated 5/23/2024 17:51 19% Age 79 years old    Deterioration Index Model 18% Blood pH abnormal (7.143)     18% Hospers coma scale 13     14% Sodium abnormal (150 mmol/L)     13% Systolic 88     10% Respiratory rate 21     6% Cardiac rhythm Atrial fib     2% Pulse oximetry 100 %     1% Pulse 88     1% Potassium 3.7 mmol/L     0% Temperature 97.8 °F (36.6 °C)     0% Hematocrit 34.6 %     0% WBC count 6.3 k/uL        Vitals:    05/23/24 1431 05/23/24 1526 05/23/24 1527 05/23/24 1736   BP:  (!) 89/65  (!) 88/60   Pulse:  97 89 88   Resp:  22 23 21   Temp:       TempSrc:       SpO2:  97% 96% 100%   Weight: 41.3 kg (91 lb)      Height: 1.676 m (5' 6\")            Opportunity for questions and clarification was provided.

## 2024-05-23 NOTE — PROGRESS NOTES
4 Eyes Skin Assessment     NAME:  Rubia Rico  YOB: 1945  MEDICAL RECORD NUMBER:  [id number]    The patient is being assessed for  Admission    I agree that at least one RN has performed a thorough Head to Toe Skin Assessment on the patient. ALL assessment sites listed below have been assessed.    4 x 2.6 wound on left buttock.   Ecchymosis to lower extremities.   Abrasion to left upper arm.   Blanchable redness to buttocks.   Redness surrounding ostomy area.     Areas assessed by both nurses:  Head, Face, Ears, Shoulders, Back, Chest, Arms, Elbows, Hands, Sacrum. Buttock, Coccyx, Ischium, and Legs. Feet and Heels  Head, Face, Ears, Shoulders, Back, Chest, Arms, Elbows, Hands, Sacrum. Buttock, Coccyx, Ischium, and Legs. Feet and Heels        Does the Patient have a Wound? Yes wound(s) were present on assessment. LDA wound assessment was Initiated and completed by RN       Ned Prevention initiated by RN: Yes  Wound Care Orders initiated by RN: Yes    Pressure Injury (Stage 3,4, Unstageable, DTI, NWPT, and Complex wounds) if present, place Wound referral order by RN under : No    New Ostomies, if present place, Ostomy referral order under : No     Nurse 1 eSignature: Electronically signed by Sandra Harris RN on 5/23/24 at 6:41 PM EDT    **SHARE this note so that the co-signing nurse can place an eSignature**    Nurse 2 eSignature: Electronically signed by Celeste Walker RN on 5/23/24 at 6:48 PM EDT

## 2024-05-24 NOTE — PROGRESS NOTES
Daughter Tram notified of patient status change. [facility] #[phone] Notified  home of patient passing. Family will be arriving shortly to see patient and then will call the  home when the patient is ready to be picked up.

## 2024-05-24 NOTE — PROGRESS NOTES
Call received from [facility], after speaking with the daughter Tram the ETA will be 30min to 1hour.

## 2024-05-24 NOTE — PROGRESS NOTES
Abby  [place] contacted needing patient family doctor for death certificate. Information provided to [facility].

## 2024-05-24 NOTE — PROGRESS NOTES
Patient found unresponsive, pupils fixed and dilated, no respiratory effort noted. This RN and another RN verified no pulse found. Message left with Olive vogel NP. Notification time 2150.

## 2024-05-28 NOTE — DISCHARGE SUMMARY
See ER notes for details. Patient  before evaluation.    Electronically signed by Brian Mckeon MD on 2024 at 1:22 PM

## 2024-05-28 NOTE — H&P
Our team was called to admit patient but unfortunately she  prior to being able to see patient.  She was found to have hypercapnic respiratory failure and family elected for comfort measures.  Hospice was to be consulted and coordination of care was to be had with family to get patient home as early as the next day.    Our condolences go to the family.    Electronically signed by Brian Mckeon MD on 2024 at 1:21 PM

## (undated) DEVICE — GLOVE SURG SZ 75 CRM LTX FREE POLYISOPRENE POLYMER BEAD ANTI

## (undated) DEVICE — DRESSING NEG PRSS M 18X12.5X3.3CM POLYUR FOR WND THER VAC

## (undated) DEVICE — COVER HNDL LT DISP

## (undated) DEVICE — Device

## (undated) DEVICE — CONTAINER SPEC ANAERB VACTNR

## (undated) DEVICE — SPONGE LAP W18XL18IN WHT COT 4 PLY FLD STRUNG RADPQ DISP ST 2 PER PACK

## (undated) DEVICE — STAPLER ENDOSCP 45MM L34CM STD NAT ARTC LIN CUT ECHELON FLX

## (undated) DEVICE — ELECTRODE PT RET AD L9FT HI MOIST COND ADH HYDRGEL CORDED

## (undated) DEVICE — BLOCK BITE 60FR RUBBER ADLT DENTAL

## (undated) DEVICE — 4-PORT MANIFOLD: Brand: NEPTUNE 2

## (undated) DEVICE — BLADE,STAINLESS-STEEL,10,STRL,DISPOSABLE: Brand: MEDLINE

## (undated) DEVICE — GOWN,SIRUS,FABRNF,XL,20/CS: Brand: MEDLINE

## (undated) DEVICE — SPONGE GZ W4XL4IN RAYON POLY CVR W/NONWOVEN FAB STRL 2/PK

## (undated) DEVICE — BLADE ES L6IN ELASTOMERIC COAT EXT DURABLE BEND UPTO 90DEG

## (undated) DEVICE — SEALER ENDOSCP NANO COAT OPN DIV CRV L JAW LIGASURE IMPACT

## (undated) DEVICE — SWAB SPEC COLL SHFT L5.25IN POLYUR FOAM TIP SFT DBL MEDIA

## (undated) DEVICE — CANISTER NEG PRSS 1000ML W/ GEL INFOVAC

## (undated) DEVICE — APPLICATOR MEDICATED 26 CC TINT HI-LITE ORNG STRL CHLORAPREP

## (undated) DEVICE — DRAIN SURG 15FR SIL RND CHN W/ TRCR FULL FLUT DBL WRP TRAD

## (undated) DEVICE — SOLUTION IRRIG 1000ML 0.9% SOD CHL USP POUR PLAS BTL

## (undated) DEVICE — MARKER,SKIN,WI/RULER AND LABELS: Brand: MEDLINE

## (undated) DEVICE — TOWEL,OR,DSP,ST,BLUE,STD,6/PK,12PK/CS: Brand: MEDLINE

## (undated) DEVICE — RELOAD STPL L75MM OPN H3.8MM CLS 1.5MM WIRE DIA0.2MM REG

## (undated) DEVICE — GLOVE SURG SZ 8 CRM LTX FREE POLYISOPRENE POLYMER BEAD ANTI

## (undated) DEVICE — YANKAUER,POOLE TIP,STERILE,50/CS: Brand: MEDLINE

## (undated) DEVICE — BLADE,STAINLESS-STEEL,15,STRL,DISPOSABLE: Brand: MEDLINE

## (undated) DEVICE — GRADUATE TRIANG MEASURE 1000ML BLK PRNT

## (undated) DEVICE — RELOAD STPL L45MM H1.5-3.6MM REG TISS BLU GRIPPING SURF B

## (undated) DEVICE — TRAP,MUCUS SPECIMEN,40CC: Brand: MEDLINE

## (undated) DEVICE — STAPLER INT L75MM CUT LN L73MM STPL LN L77MM BLU B FRM 8

## (undated) DEVICE — DRAPE,LAPAROTOMY,PCH,STERILE: Brand: MEDLINE

## (undated) DEVICE — PACK PROCEDURE SURG GEN CUST

## (undated) DEVICE — AGENT HEMSTAT W2XL4IN OXIDIZED REGENERATED CELOS ABSRB

## (undated) DEVICE — DOUBLE BASIN SET: Brand: MEDLINE INDUSTRIES, INC.